# Patient Record
Sex: MALE | Race: BLACK OR AFRICAN AMERICAN | NOT HISPANIC OR LATINO | Employment: OTHER | URBAN - METROPOLITAN AREA
[De-identification: names, ages, dates, MRNs, and addresses within clinical notes are randomized per-mention and may not be internally consistent; named-entity substitution may affect disease eponyms.]

---

## 2019-06-28 ENCOUNTER — PATIENT OUTREACH (OUTPATIENT)
Dept: ADMINISTRATIVE | Facility: HOSPITAL | Age: 69
End: 2019-06-28

## 2019-08-22 ENCOUNTER — PATIENT OUTREACH (OUTPATIENT)
Dept: ADMINISTRATIVE | Facility: HOSPITAL | Age: 69
End: 2019-08-22

## 2020-03-31 ENCOUNTER — NURSE TRIAGE (OUTPATIENT)
Dept: ADMINISTRATIVE | Facility: CLINIC | Age: 70
End: 2020-03-31

## 2020-03-31 NOTE — TELEPHONE ENCOUNTER
Spoke to sister. She is concerned because patient is having diarrhea x 1 week and staying in bed for the past week, not eating/drinking much. Also with cough and cold symptoms. Sister seems very concerned.    Reason for Disposition   Drinking very little and has signs of dehydration (e.g., no urine > 12 hours, very dry mouth, very lightheaded)    Additional Information   Negative: Shock suspected (e.g., cold/pale/clammy skin, too weak to stand, low BP, rapid pulse)   Negative: Difficult to awaken or acting confused (e.g., disoriented, slurred speech)   Negative: Sounds like a life-threatening emergency to the triager   Negative: Vomiting also present and worse than the diarrhea   Negative: Blood in stool and without diarrhea   Negative: SEVERE abdominal pain (e.g., excruciating) and present > 1 hour   Negative: SEVERE abdominal pain and age > 60   Negative: Bloody, black, or tarry bowel movements    Protocols used: DIARRHEA-A-OH    Will call back pt's sister to make sure both are going to the ED

## 2020-06-29 PROBLEM — R55 SYNCOPE: Status: ACTIVE | Noted: 2020-06-29

## 2020-06-29 PROBLEM — F20.9 SCHIZOPHRENIA: Status: RESOLVED | Noted: 2020-06-29 | Resolved: 2020-06-29

## 2020-06-29 PROBLEM — F20.9 SCHIZOPHRENIA: Status: ACTIVE | Noted: 2020-06-29

## 2022-01-10 ENCOUNTER — PATIENT OUTREACH (OUTPATIENT)
Dept: ADMINISTRATIVE | Facility: HOSPITAL | Age: 72
End: 2022-01-10
Payer: MEDICAID

## 2022-02-14 PROBLEM — R39.81 FUNCTIONAL URINARY INCONTINENCE: Status: ACTIVE | Noted: 2022-02-14

## 2022-03-03 ENCOUNTER — LAB VISIT (OUTPATIENT)
Dept: LAB | Facility: HOSPITAL | Age: 72
End: 2022-03-03
Payer: MEDICARE

## 2022-03-03 DIAGNOSIS — Z12.11 COLON CANCER SCREENING: ICD-10-CM

## 2022-03-03 PROCEDURE — 82274 ASSAY TEST FOR BLOOD FECAL: CPT | Performed by: INTERNAL MEDICINE

## 2022-03-07 ENCOUNTER — TELEPHONE (OUTPATIENT)
Dept: ADMINISTRATIVE | Facility: OTHER | Age: 72
End: 2022-03-07
Payer: MEDICAID

## 2022-03-08 ENCOUNTER — TELEPHONE (OUTPATIENT)
Dept: NEUROLOGY | Facility: CLINIC | Age: 72
End: 2022-03-08

## 2022-03-08 ENCOUNTER — OFFICE VISIT (OUTPATIENT)
Dept: NEUROLOGY | Facility: CLINIC | Age: 72
End: 2022-03-08
Payer: MEDICARE

## 2022-03-08 VITALS
HEART RATE: 65 BPM | WEIGHT: 166.25 LBS | SYSTOLIC BLOOD PRESSURE: 142 MMHG | HEIGHT: 74 IN | OXYGEN SATURATION: 99 % | RESPIRATION RATE: 14 BRPM | DIASTOLIC BLOOD PRESSURE: 84 MMHG | BODY MASS INDEX: 21.34 KG/M2

## 2022-03-08 DIAGNOSIS — R41.3 MEMORY LOSS: ICD-10-CM

## 2022-03-08 DIAGNOSIS — F01.518 VASCULAR DEMENTIA WITH BEHAVIOR DISTURBANCE: Primary | ICD-10-CM

## 2022-03-08 DIAGNOSIS — I69.30 LATE EFFECT OF STROKE: ICD-10-CM

## 2022-03-08 DIAGNOSIS — E78.5 HYPERLIPIDEMIA, UNSPECIFIED HYPERLIPIDEMIA TYPE: ICD-10-CM

## 2022-03-08 DIAGNOSIS — F17.200 SMOKER: ICD-10-CM

## 2022-03-08 DIAGNOSIS — F39 MOOD DISORDER: ICD-10-CM

## 2022-03-08 DIAGNOSIS — I10 ESSENTIAL HYPERTENSION: ICD-10-CM

## 2022-03-08 DIAGNOSIS — F91.9 BEHAVIOR DISTURBANCE: ICD-10-CM

## 2022-03-08 LAB — HEMOCCULT STL QL IA: NEGATIVE

## 2022-03-08 PROCEDURE — 3079F DIAST BP 80-89 MM HG: CPT | Mod: CPTII,S$GLB,, | Performed by: PSYCHIATRY & NEUROLOGY

## 2022-03-08 PROCEDURE — 99999 PR PBB SHADOW E&M-EST. PATIENT-LVL IV: CPT | Mod: PBBFAC,,, | Performed by: PSYCHIATRY & NEUROLOGY

## 2022-03-08 PROCEDURE — 1159F PR MEDICATION LIST DOCUMENTED IN MEDICAL RECORD: ICD-10-PCS | Mod: CPTII,S$GLB,, | Performed by: PSYCHIATRY & NEUROLOGY

## 2022-03-08 PROCEDURE — 1126F AMNT PAIN NOTED NONE PRSNT: CPT | Mod: CPTII,S$GLB,, | Performed by: PSYCHIATRY & NEUROLOGY

## 2022-03-08 PROCEDURE — 3077F SYST BP >= 140 MM HG: CPT | Mod: CPTII,S$GLB,, | Performed by: PSYCHIATRY & NEUROLOGY

## 2022-03-08 PROCEDURE — 3077F PR MOST RECENT SYSTOLIC BLOOD PRESSURE >= 140 MM HG: ICD-10-PCS | Mod: CPTII,S$GLB,, | Performed by: PSYCHIATRY & NEUROLOGY

## 2022-03-08 PROCEDURE — 99999 PR PBB SHADOW E&M-EST. PATIENT-LVL IV: ICD-10-PCS | Mod: PBBFAC,,, | Performed by: PSYCHIATRY & NEUROLOGY

## 2022-03-08 PROCEDURE — 3079F PR MOST RECENT DIASTOLIC BLOOD PRESSURE 80-89 MM HG: ICD-10-PCS | Mod: CPTII,S$GLB,, | Performed by: PSYCHIATRY & NEUROLOGY

## 2022-03-08 PROCEDURE — 1159F MED LIST DOCD IN RCRD: CPT | Mod: CPTII,S$GLB,, | Performed by: PSYCHIATRY & NEUROLOGY

## 2022-03-08 PROCEDURE — 1160F PR REVIEW ALL MEDS BY PRESCRIBER/CLIN PHARMACIST DOCUMENTED: ICD-10-PCS | Mod: CPTII,S$GLB,, | Performed by: PSYCHIATRY & NEUROLOGY

## 2022-03-08 PROCEDURE — 3008F PR BODY MASS INDEX (BMI) DOCUMENTED: ICD-10-PCS | Mod: CPTII,S$GLB,, | Performed by: PSYCHIATRY & NEUROLOGY

## 2022-03-08 PROCEDURE — 1160F RVW MEDS BY RX/DR IN RCRD: CPT | Mod: CPTII,S$GLB,, | Performed by: PSYCHIATRY & NEUROLOGY

## 2022-03-08 PROCEDURE — 1126F PR PAIN SEVERITY QUANTIFIED, NO PAIN PRESENT: ICD-10-PCS | Mod: CPTII,S$GLB,, | Performed by: PSYCHIATRY & NEUROLOGY

## 2022-03-08 PROCEDURE — 99205 OFFICE O/P NEW HI 60 MIN: CPT | Mod: S$GLB,,, | Performed by: PSYCHIATRY & NEUROLOGY

## 2022-03-08 PROCEDURE — 3008F BODY MASS INDEX DOCD: CPT | Mod: CPTII,S$GLB,, | Performed by: PSYCHIATRY & NEUROLOGY

## 2022-03-08 PROCEDURE — 99205 PR OFFICE/OUTPT VISIT, NEW, LEVL V, 60-74 MIN: ICD-10-PCS | Mod: S$GLB,,, | Performed by: PSYCHIATRY & NEUROLOGY

## 2022-03-08 PROCEDURE — 3044F PR MOST RECENT HEMOGLOBIN A1C LEVEL <7.0%: ICD-10-PCS | Mod: CPTII,S$GLB,, | Performed by: PSYCHIATRY & NEUROLOGY

## 2022-03-08 PROCEDURE — 3044F HG A1C LEVEL LT 7.0%: CPT | Mod: CPTII,S$GLB,, | Performed by: PSYCHIATRY & NEUROLOGY

## 2022-03-08 NOTE — PROGRESS NOTES
Consult from Dr. Lundberg    HPI: Irvin Nuñez Jr. is a 71 y.o. male with a history of memory problems first noted by sister The symptoms started 3 years ago.   Examples of memory problems noted include? He  forges why he walked into a room. Biggest issue is him not wanting to get up to urinate. He has some incontinence perhaps as a result.Family tries timed voiding but to no avail. PCP prescribed condom cath, diapers.  Sister thinks he is stubborn. It is easy for him to be annoyed when encouraged regarding hygieine but fluoxetine helps  He talks less to people overall  Stable with memory overall but less motivated over time    Level of education completed is did not attend school  Occupation is unemployed  Impairment in ADLS such as doing bills, cooking, doing laundry, dressing self? Sister does all bills and cooking and he can get himself dressed without difficulty    Mood is described as ok by him and sister as long as he is left alone   The patient does not have any delusions, hallucinations, paranoia,   or tremors.  Falls rarely. Needs assistant with walking when drinking    He still drinks rarely but will binge drink about every 3-4 months.    Chart says schizophrenia but sister does known he was  Hospitalized remotely for mood but he had had a withdrawn mood most of his life    He can read and write sometime.     There is not a Family History of memory disorders. The patient is not still driving and is not still cooking.   Prior brain imaging or memory labs work-up? Done in 2020 with admission for syncope.     No wandering and no hallucinations currently  + Smoker    Fainting but not seizure history. Prior alcoholic  Did not have any known prior stroke history until found on MRIs per sister  BP mildly hypertensive today  P 60s    He is incontinent    Patient is here with his sister who lives next door to him and provides him with good support      Review of Systems   Unable to perform ROS: Dementia         I  have reviewed all of this patient's past medical and surgical histories as well as family and social histories and active allergies and medications as documented in the electronic medical record.        Exam:  Gen Appearance, well developed/nourished in no apparent distress  CV: 2+ distal pulses with no edema or swelling  Neuro:  MS: Awake, alert, oriented to place, person, but not to time, and not fully situation. Sustains attention. Recent is 2/3 at 5 minutes/remote memory intact, Language is full to spontaneous speech/repetition/naming/comprehension. Fund of Knowledge is full  Can't name current president but only 2 past presidents  Clock drawing is excelent  Psychomotor slowing with task noted mildly  Patient is calm and president  CN: Optic discs are flat with normal vasculature, PERRL, Extraoccular movements and visual fields are full. Normal facial sensation and strength, Hearing symmetric, Tongue and Palate are midline and strong. Shoulder Shrug symmetric and strong.  Motor: Normal bulk, tone, no abnormal movements. 5/5 strength bilateral upper/lower extremities with 2+ reflexes and bilateral plantar response  Sensory: symmetric to light touch, pain, temp, and vibration/proprioception. Romberg negative  Cerebellar: Finger-nose,Heal-shin, Rapid alternating movements intact  Gait: Normal stance, no ataxia but slowed gait and walks with a walker    Imagin2020 MRI brain: Study significantly degraded by motion artifact.     No evidence of acute infarction.     Mild atrophy, severe presumed microvascular ischemic changes cerebral white matter, old infarct anterior right basal ganglia, incidental prominent perivascular spaces and/or old lacunar infarcts bilateral basal ganglia and thalami and additional presumed microvascular ischemic change central radha.     Echo: The left ventricle (LV) is normal in size with normal systolic function. Normal LV geometry. False LV tendon.  1. There are no significant  regional wall motion abnormalities.  2. LV ejection fraction is 55-60%. Normal LV diastolic function.  3. The right ventricle (RV) is normal in size with normal systolic function.   4. Estimated RVSP is normal (less than 40 mmHg). Pulmonary hypertension is absent.  5. Normal sized atria.  6. The aortic valve is poorly visualized; appears sclerocalcific - probably mild stenosis with PV of 1.97 m/s, KATRINA 2.0 cm2, MG 8.  7. No prior studies available for comparison.    Per record: Carotid U/S without significant obstruction in 2019    Labs: 2020 RPR / TSH / B12 / Folate unremarkable  CMP: CKD  CBC: anemia    2020 EKG: NSR    2022 A1C: 5.4    2019 LDL 70    Assessment/Plan: Irvin Nuñez Jr. is a 71 y.o. male with no known history of stroke who has developed memory loss over the past 3 year (currently stable). Multiples lacunar strokes on MRI brain 2020  I recommend:     1. 2020 MRI brain: multiple bilateral lacunar infarcts and microvascular changes  2. 2020 Echo: normal EF, Carotid U/S without significant obstruction in 2019 per record  3. Stroke risk factors include HTN, DLD, and smoking  -For stroke prevention: continue ASA, statin, and anti-HTN meds  -Goal BP less than 140/90, Goal LDL less than 70, and Goal A1C less than 7. Labs followed by PCP  -He and family are not interested in smoking cessation although this was urged today for stroke prevention  4. Discussed he likely has Vascular dementia  - Also has a significant, remote EtOH hx but also binge drinks a few times per year. This was discouraged  - Cont Aricept per PCP  -PCP treats mood  -Schizophrenia history noted in chart. At least at this time, he is not actively psychotic but has been prior. His sister states his motivation has been poor and he has had withdrawn behavior his entire life. His poor motivation is limiting his care. Will refer to psychiatry for better evaluation / to rule out any superimposed mood other other psychiatric disorder.   -His  sister who lives next door helps in his care. He no longer drives   5. CKD noted    RTC given  CC: Dr. Lundberg

## 2023-04-26 DIAGNOSIS — Z12.11 COLON CANCER SCREENING: ICD-10-CM

## 2023-07-19 ENCOUNTER — PATIENT OUTREACH (OUTPATIENT)
Dept: ADMINISTRATIVE | Facility: HOSPITAL | Age: 73
End: 2023-07-19
Payer: MEDICARE

## 2023-09-22 PROBLEM — F01.50 VASCULAR DEMENTIA: Status: ACTIVE | Noted: 2023-09-22

## 2023-09-22 PROBLEM — R40.20 LOC (LOSS OF CONSCIOUSNESS): Status: ACTIVE | Noted: 2023-09-22

## 2023-09-22 PROBLEM — J18.9 COMMUNITY ACQUIRED PNEUMONIA OF LEFT LUNG: Status: ACTIVE | Noted: 2023-09-22

## 2023-09-22 PROBLEM — R55 SYNCOPE: Status: ACTIVE | Noted: 2023-09-22

## 2023-09-22 PROBLEM — M54.50 CHRONIC LOW BACK PAIN: Status: ACTIVE | Noted: 2023-09-22

## 2023-09-22 PROBLEM — G89.29 CHRONIC LOW BACK PAIN: Status: ACTIVE | Noted: 2023-09-22

## 2023-09-25 ENCOUNTER — TELEPHONE (OUTPATIENT)
Dept: NEUROLOGY | Facility: CLINIC | Age: 73
End: 2023-09-25
Payer: MEDICARE

## 2023-09-25 PROBLEM — L89.322 PRESSURE INJURY OF LEFT BUTTOCK, STAGE 2: Status: ACTIVE | Noted: 2023-09-25

## 2023-09-25 NOTE — TELEPHONE ENCOUNTER
Spoke with MD from [facility]. Patient has had syncopal episodes, thought to be orthostatic in nature  Has not seen me over  a year, so I recommended he re-establish follow up  In the interim:   Suggested primary team ask urology if flomax could be d/c'ed or switch. Suggested compression stockings.

## 2023-09-28 ENCOUNTER — OFFICE VISIT (OUTPATIENT)
Dept: NEUROLOGY | Facility: CLINIC | Age: 73
End: 2023-09-28
Payer: MEDICARE

## 2023-09-28 ENCOUNTER — OFFICE VISIT (OUTPATIENT)
Dept: UROLOGY | Facility: CLINIC | Age: 73
End: 2023-09-28
Attending: SPECIALIST
Payer: MEDICARE

## 2023-09-28 VITALS
DIASTOLIC BLOOD PRESSURE: 64 MMHG | BODY MASS INDEX: 21.48 KG/M2 | HEIGHT: 74 IN | RESPIRATION RATE: 16 BRPM | OXYGEN SATURATION: 99 % | SYSTOLIC BLOOD PRESSURE: 112 MMHG | HEART RATE: 79 BPM

## 2023-09-28 VITALS
WEIGHT: 167 LBS | OXYGEN SATURATION: 98 % | BODY MASS INDEX: 21.43 KG/M2 | DIASTOLIC BLOOD PRESSURE: 64 MMHG | HEART RATE: 78 BPM | SYSTOLIC BLOOD PRESSURE: 122 MMHG | HEIGHT: 74 IN

## 2023-09-28 DIAGNOSIS — R55 SYNCOPE, UNSPECIFIED SYNCOPE TYPE: ICD-10-CM

## 2023-09-28 DIAGNOSIS — Z86.73 HISTORY OF CVA (CEREBROVASCULAR ACCIDENT): ICD-10-CM

## 2023-09-28 DIAGNOSIS — I10 ESSENTIAL HYPERTENSION: ICD-10-CM

## 2023-09-28 DIAGNOSIS — N40.0 BENIGN PROSTATIC HYPERPLASIA, UNSPECIFIED WHETHER LOWER URINARY TRACT SYMPTOMS PRESENT: Primary | ICD-10-CM

## 2023-09-28 DIAGNOSIS — F01.B3 MODERATE VASCULAR DEMENTIA WITH MOOD DISTURBANCE: Primary | ICD-10-CM

## 2023-09-28 PROCEDURE — 99999 PR PBB SHADOW E&M-EST. PATIENT-LVL III: ICD-10-PCS | Mod: PBBFAC,,, | Performed by: NURSE PRACTITIONER

## 2023-09-28 PROCEDURE — 1159F MED LIST DOCD IN RCRD: CPT | Mod: CPTII,S$GLB,, | Performed by: SPECIALIST

## 2023-09-28 PROCEDURE — 99999 PR PBB SHADOW E&M-EST. PATIENT-LVL III: CPT | Mod: PBBFAC,,, | Performed by: NURSE PRACTITIONER

## 2023-09-28 PROCEDURE — 1159F PR MEDICATION LIST DOCUMENTED IN MEDICAL RECORD: ICD-10-PCS | Mod: CPTII,S$GLB,, | Performed by: SPECIALIST

## 2023-09-28 PROCEDURE — 3078F DIAST BP <80 MM HG: CPT | Mod: CPTII,S$GLB,, | Performed by: SPECIALIST

## 2023-09-28 PROCEDURE — 3078F PR MOST RECENT DIASTOLIC BLOOD PRESSURE < 80 MM HG: ICD-10-PCS | Mod: CPTII,S$GLB,, | Performed by: NURSE PRACTITIONER

## 2023-09-28 PROCEDURE — 99999 PR PBB SHADOW E&M-EST. PATIENT-LVL III: ICD-10-PCS | Mod: PBBFAC,,, | Performed by: SPECIALIST

## 2023-09-28 PROCEDURE — 3074F PR MOST RECENT SYSTOLIC BLOOD PRESSURE < 130 MM HG: ICD-10-PCS | Mod: CPTII,S$GLB,, | Performed by: NURSE PRACTITIONER

## 2023-09-28 PROCEDURE — 3008F PR BODY MASS INDEX (BMI) DOCUMENTED: ICD-10-PCS | Mod: CPTII,S$GLB,, | Performed by: NURSE PRACTITIONER

## 2023-09-28 PROCEDURE — 3074F PR MOST RECENT SYSTOLIC BLOOD PRESSURE < 130 MM HG: ICD-10-PCS | Mod: CPTII,S$GLB,, | Performed by: SPECIALIST

## 2023-09-28 PROCEDURE — 3008F PR BODY MASS INDEX (BMI) DOCUMENTED: ICD-10-PCS | Mod: CPTII,S$GLB,, | Performed by: SPECIALIST

## 2023-09-28 PROCEDURE — 1126F PR PAIN SEVERITY QUANTIFIED, NO PAIN PRESENT: ICD-10-PCS | Mod: CPTII,S$GLB,, | Performed by: SPECIALIST

## 2023-09-28 PROCEDURE — 3008F BODY MASS INDEX DOCD: CPT | Mod: CPTII,S$GLB,, | Performed by: NURSE PRACTITIONER

## 2023-09-28 PROCEDURE — 1126F AMNT PAIN NOTED NONE PRSNT: CPT | Mod: CPTII,S$GLB,, | Performed by: SPECIALIST

## 2023-09-28 PROCEDURE — 1101F PR PT FALLS ASSESS DOC 0-1 FALLS W/OUT INJ PAST YR: ICD-10-PCS | Mod: CPTII,S$GLB,, | Performed by: SPECIALIST

## 2023-09-28 PROCEDURE — 99214 PR OFFICE/OUTPT VISIT, EST, LEVL IV, 30-39 MIN: ICD-10-PCS | Mod: S$GLB,,, | Performed by: NURSE PRACTITIONER

## 2023-09-28 PROCEDURE — 3074F SYST BP LT 130 MM HG: CPT | Mod: CPTII,S$GLB,, | Performed by: NURSE PRACTITIONER

## 2023-09-28 PROCEDURE — 3008F BODY MASS INDEX DOCD: CPT | Mod: CPTII,S$GLB,, | Performed by: SPECIALIST

## 2023-09-28 PROCEDURE — 3288F PR FALLS RISK ASSESSMENT DOCUMENTED: ICD-10-PCS | Mod: CPTII,S$GLB,, | Performed by: SPECIALIST

## 2023-09-28 PROCEDURE — 1159F PR MEDICATION LIST DOCUMENTED IN MEDICAL RECORD: ICD-10-PCS | Mod: CPTII,S$GLB,, | Performed by: NURSE PRACTITIONER

## 2023-09-28 PROCEDURE — 1159F MED LIST DOCD IN RCRD: CPT | Mod: CPTII,S$GLB,, | Performed by: NURSE PRACTITIONER

## 2023-09-28 PROCEDURE — 3078F PR MOST RECENT DIASTOLIC BLOOD PRESSURE < 80 MM HG: ICD-10-PCS | Mod: CPTII,S$GLB,, | Performed by: SPECIALIST

## 2023-09-28 PROCEDURE — 1160F RVW MEDS BY RX/DR IN RCRD: CPT | Mod: CPTII,S$GLB,, | Performed by: SPECIALIST

## 2023-09-28 PROCEDURE — 99202 PR OFFICE/OUTPT VISIT, NEW, LEVL II, 15-29 MIN: ICD-10-PCS | Mod: S$GLB,,, | Performed by: SPECIALIST

## 2023-09-28 PROCEDURE — 3288F FALL RISK ASSESSMENT DOCD: CPT | Mod: CPTII,S$GLB,, | Performed by: SPECIALIST

## 2023-09-28 PROCEDURE — 99202 OFFICE O/P NEW SF 15 MIN: CPT | Mod: S$GLB,,, | Performed by: SPECIALIST

## 2023-09-28 PROCEDURE — 3078F DIAST BP <80 MM HG: CPT | Mod: CPTII,S$GLB,, | Performed by: NURSE PRACTITIONER

## 2023-09-28 PROCEDURE — 1101F PT FALLS ASSESS-DOCD LE1/YR: CPT | Mod: CPTII,S$GLB,, | Performed by: SPECIALIST

## 2023-09-28 PROCEDURE — 1160F PR REVIEW ALL MEDS BY PRESCRIBER/CLIN PHARMACIST DOCUMENTED: ICD-10-PCS | Mod: CPTII,S$GLB,, | Performed by: SPECIALIST

## 2023-09-28 PROCEDURE — 3074F SYST BP LT 130 MM HG: CPT | Mod: CPTII,S$GLB,, | Performed by: SPECIALIST

## 2023-09-28 PROCEDURE — 99999 PR PBB SHADOW E&M-EST. PATIENT-LVL III: CPT | Mod: PBBFAC,,, | Performed by: SPECIALIST

## 2023-09-28 PROCEDURE — 99214 OFFICE O/P EST MOD 30 MIN: CPT | Mod: S$GLB,,, | Performed by: NURSE PRACTITIONER

## 2023-09-28 NOTE — PROGRESS NOTES
HPI: Irvin Nuñez Jr. is a 73 y.o. male with vascular dementia and a history of mood disorder.     He lives in [place].     He presents today to reestablish care for his dementia. Recent history of syncope believed to be related to hypotension. Flomax was discontinued. No further episodes since his discharge. PCP is monitoring this.     He is here with his sister today. He lives with his sister. She does have difficulty caring for him at times. There was a home health referral after his recent admit, but no one has contacted her to set this up.     Memory has progressed over the past year. He is slower to respond and is less interactive. He is resistant to care at times. PCP is managing his mood.     He does sleep well at night.     No hallucinations reported, but he rubs his feet as if he is swatting something away at times.     Good appetite.     He is non ambulatory.    He is incontinent    Review of Systems   Unable to perform ROS: Dementia         I have reviewed all of this patient's past medical and surgical histories as well as family and social histories and active allergies and medications as documented in the electronic medical record.    Exam:  Gen Appearance, well developed/nourished in no apparent distress  CV: 2+ distal pulses with no edema or swelling  Neuro:  MS: Awake, alert, oriented to place, person, but not to time, and not fully situation. Sustains attention. Recent recall is moderately impaired, Language is full to spontaneous speech/repetition/naming/comprehension. Fund of Knowledge is full.   Unable to name any Presidents today  Psychomotor slowing with task noted mildly  Patient is calm  CN: PERRL, Extraoccular movements and visual fields are full. Normal facial sensation and strength, Hearing symmetric, Tongue and Palate are midline and strong. Shoulder Shrug symmetric and strong.  Motor: Normal bulk, tone, no abnormal movements. 5/5 strength bilateral upper/lower extremities with 2+  reflexes and bilateral plantar response  Sensory: symmetric to light touch, pain, temp, and vibration/proprioception. Romberg negative  Cerebellar: Finger-nose,Heal-shin, Rapid alternating movements intact  Gait: not done; seated in wheelchair today.     Imagin/2023 MRI Brain:   FINDINGS:  Evaluation is limited by motion artifact.     No evidence of acute infarction.     Moderate atrophy.  Fairly extensive patchy and confluent nonspecific T2 hyperintensities again noted cerebral white matter and may reflect severe microvascular ischemic change.  Multiple old bilateral basal ganglia and thalamic lacunar infarcts are present with associated volume loss.  Old subcentimeter infarcts also again noted corpus callosum and deep white matter.  Old lacunar infarcts with additional presumed microvascular ischemic change central radha.     No intracranial space-occupying mass or mass effect.     Mucosal thickening right frontal and left maxillary sinuses.     Impression:     No evidence of acute infarction.     Again demonstrated are moderate atrophy, severe presumed microvascular ischemic change white matter and central radha, multiple old bilateral basal ganglia and thalamic lacunar infarcts with associated volume loss basal ganglia, old deep white matter infarcts and multiple small old infarcts corpus callosum.       2020 MRI brain: Study significantly degraded by motion artifact.     No evidence of acute infarction.     Mild atrophy, severe presumed microvascular ischemic changes cerebral white matter, old infarct anterior right basal ganglia, incidental prominent perivascular spaces and/or old lacunar infarcts bilateral basal ganglia and thalami and additional presumed microvascular ischemic change central radha.     Echo: The left ventricle (LV) is normal in size with normal systolic function. Normal LV geometry. False LV tendon.  There are no significant regional wall motion abnormalities.  LV ejection fraction  is 55-60%. Normal LV diastolic function.  The right ventricle (RV) is normal in size with normal systolic function.   Estimated RVSP is normal (less than 40 mmHg). Pulmonary hypertension is absent.  Normal sized atria.  The aortic valve is poorly visualized; appears sclerocalcific - probably mild stenosis with PV of 1.97 m/s, KATRINA 2.0 cm2, MG 8.  No prior studies available for comparison.    Per record: Carotid U/S without significant obstruction in 2019    Labs: 2020 RPR / TSH / B12 / Folate unremarkable  CMP: CKD  CBC: anemia    2020 EKG: NSR    2022 A1C: 5.4    2019 LDL 70    Assessment/Plan: Irvin Nuñez Jr. is a 73 y.o. male with no known history of stroke who has developed memory loss over the past 3 year (currently stable). Multiples lacunar strokes on MRI brain 2020  I recommend:     1. 2020 MRI brain: multiple bilateral lacunar infarcts and microvascular changes  2. 2020 Echo: normal EF, Carotid U/S without significant obstruction in 2019 per record  3. Stroke risk factors include HTN, DLD, and smoking  -For stroke prevention: continue ASA, statin, and anti-HTN meds  -Goal BP less than 140/90, Goal LDL less than 70, and Goal A1C less than 7. Labs followed by PCP  -He and family are not interested in smoking cessation although this was urged today for stroke prevention  4. Clinical picture consistent with vascular dementia. Care is supportive.   - Also has a significant, remote EtOH hx but also binge drinks a few times per year. This was discouraged  - Cont Aricept per PCP  -PCP treats mood  -Schizophrenia history noted in chart. At least at this time, he is not actively psychotic but has been prior. His sister states his motivation has been poor and he has had withdrawn behavior his entire life. His poor motivation is limiting his care. Will refer to psychiatry for better evaluation / to rule out any superimposed mood other other psychiatric disorder.   -His sister who lives next door helps in his care. He  no longer drives   5. CKD noted  6. Will try to coordinate with home health per referral from recent admit to make an initial home visit. I discussed [place] on aging also to help his sister with resources to care for him.   7. Recent syncope may have been hypotension related. No further episodes since some of his medications were reduced. Episodes do not seem consistent with seizures. Should he have more episodes, I would recommend that he consult with Cardiology.     FU 1 year regarding dementia    CC: Dr. Lundberg

## 2023-09-28 NOTE — PROGRESS NOTES
[facility] - Urology   Clinic Note    SUBJECTIVE:     Chief Complaint   Patient presents with    Benign Prostatic Hypertrophy     Pt coming in for an enlarged prostate, states that he has to wear pull ups and another pad to help keep him dry but he still wakes up soaking wet.        Referral from: Self, Aaareferral.    History of Present Illness:  Irvin Nuñez Jr. is a 73 y.o. male who presents to clinic for incontinence & failure to thrive.    He was just discharge from hospital after some falls.  Unclear if  involved.  He is primarily looked after by his sister.  Chief complaint is no urinary control.  She wants to send him home from clinic with a patino catheter.  I scanned his bladder and he has less than 25 cc of urine.  I recommended a condom catheter and reviewed such products on line as well as ordering information.    Patient endorses no additional complaints at this time.    Past Medical History:   Diagnosis Date    High cholesterol     Hypertension     Incontinence of urine     Schizophrenia     Vascular dementia        Past Surgical History:   Procedure Laterality Date    NO PAST SURGERIES         Family History   Problem Relation Age of Onset    Heart attack Mother     No Known Problems Father        Social History     Tobacco Use    Smoking status: Former     Current packs/day: 0.50     Average packs/day: 0.5 packs/day for 0.5 years (0.3 ttl pk-yrs)     Types: Cigarettes     Start date: 3/22/2023    Smokeless tobacco: Never   Substance Use Topics    Alcohol use: Yes     Alcohol/week: 0.0 standard drinks of alcohol     Comment: beer/wine daily    Drug use: No       Current Outpatient Medications on File Prior to Visit   Medication Sig Dispense Refill    alfuzosin (UROXATRAL) 10 mg Tb24 Take 1 tablet (10 mg total) by mouth daily with breakfast. 90 tablet 3    amLODIPine (NORVASC) 2.5 MG tablet Take 1 tablet (2.5 mg total) by mouth once daily. 90 tablet 3    diclofenac sodium  "(VOLTAREN) 1 % Gel APPLY 2g topically once DAILY 200 g 2    donepeziL (ARICEPT) 10 MG tablet TAKE ONE TABLET BY MOUTH EVERY EVENING 30 tablet 11    finasteride (PROSCAR) 5 mg tablet Take 1 tablet (5 mg total) by mouth once daily. 90 tablet 3    FLUoxetine 20 MG capsule TAKE ONE CAPSULE BY MOUTH EVERY MORNING 90 capsule 3    folic acid (FOLVITE) 1 MG tablet TAKE ONE TABLET BY MOUTH EVERY NIGHT 90 tablet 3    pravastatin (PRAVACHOL) 40 MG tablet Take 1 tablet (40 mg total) by mouth every evening. 30 tablet 11    traMADoL (ULTRAM) 50 mg tablet TAKE TWO TABLETS BY MOUTH THREE TIMES DAILY AS NEEDED FOR PAIN 180 tablet 0     No current facility-administered medications on file prior to visit.       Review of patient's allergies indicates:  No Known Allergies    ROS     Review of Systems:  A review of 10+ systems was conducted with pertinent positive and negative findings documented in HPI with all other systems reviewed and negative.    OBJECTIVE:     Estimated body mass index is 21.44 kg/m² as calculated from the following:    Height as of this encounter: 6' 2" (1.88 m).    Weight as of this encounter: 75.8 kg (167 lb).    Vital Signs (Most Recent)  Vitals:    09/28/23 1327   BP: 122/64   Pulse: 78       Physical Exam:    Physical Exam     GENERAL: patient sitting comfortably  HEENT: normocephalic  NECK: supple, no JVD  PULM: normal chest rise, no increased WOB  HEART: non-diaphoretic  ABDO: soft, nondistended, nontender, bladder not distended  PSYCH: appropriate mood and affect        LABS:     Lab Results   Component Value Date    BUN 8 09/24/2023    CREATININE 0.7 09/24/2023    WBC 6.52 09/24/2023    HGB 10.9 (L) 09/24/2023    HCT 34.4 (L) 09/24/2023     09/24/2023    AST 25 09/22/2023    ALT 21 09/22/2023    ALKPHOS 64 09/22/2023    ALBUMIN 2.9 (L) 09/22/2023    HGBA1C 5.4 02/14/2022    INR 1.0 09/22/2023        Urinalysis:   No results found for: "UAREFLEX"     PSA:  Lab Results   Component Value Date    PSA " 1.7 02/14/2022    PSA 2.0 06/23/2020    PSA 1.4 03/26/2019    PSA 1.7 10/14/2015       ASSESSMENT     1. Benign prostatic hyperplasia, unspecified whether lower urinary tract symptoms present        PLAN:     Use condom catheter as discussed.   for home care pending    Reymundo Oquendo MD  Urology  [facility]     Disclaimer: This note has been generated using voice-recognition software. There may be typographical errors that have been missed during proof-reading.

## 2023-10-11 ENCOUNTER — TELEPHONE (OUTPATIENT)
Dept: UROLOGY | Facility: CLINIC | Age: 73
End: 2023-10-11
Payer: MEDICARE

## 2023-10-11 NOTE — TELEPHONE ENCOUNTER
Returned call to Meenakshi  regarding pt, Orin stated that she seen the Condom Catheter order form in pt's home and wanted to know if she needed to order this item or if item was already ordered. Told Orin that I would get with Dr. Oquendo and see what he wants to do.  nurse verbalized understanding.

## 2023-10-11 NOTE — TELEPHONE ENCOUNTER
----- Message from Reymundo Oquendo MD sent at 10/10/2023  4:33 PM CDT -----  Contact: [facility]  Patient needs to be seen by home health  ----- Message -----  From: Isaura Barrera MA  Sent: 10/10/2023   8:30 AM CDT  To: Reymundo Oquendo MD    Please Advise.    ----- Message -----  From: Dyan Cordon MA  Sent: 10/6/2023   2:18 PM CDT  To: [facility] Staff    Irvin Nuñez Jr.  MRN: [id number]  : 1950  PCP: Alberto Lundberg Jr.  Home Phone      [phone]  Work Phone      Not on file.  Mobile          [phone]      MESSAGE:    Patient was sent home with paperwork for a condom catheder, home health needs to know if this was ordered for patient or if it needs to be ordered through a DME. They are aware message will be addressed on Monday. Phone number is [phone]

## 2023-10-13 NOTE — TELEPHONE ENCOUNTER
Called Orin from [facility] back regarding our pt's condom catheters. Told her per Dr. Oquendo that he would like them to order the catheters. Orin would like for us to fax over the prescription form so she can get them ordered. Told her I would let Dr. Oquendo know and try and get those faxed over today. Orin verbalized understanding.

## 2023-10-14 DIAGNOSIS — N39.3: Primary | ICD-10-CM

## 2023-10-23 ENCOUNTER — EXTERNAL HOME HEALTH (OUTPATIENT)
Dept: HOME HEALTH SERVICES | Facility: HOSPITAL | Age: 73
End: 2023-10-23
Payer: MEDICARE

## 2023-10-26 ENCOUNTER — DOCUMENT SCAN (OUTPATIENT)
Dept: HOME HEALTH SERVICES | Facility: HOSPITAL | Age: 73
End: 2023-10-26
Payer: MEDICARE

## 2023-11-21 ENCOUNTER — DOCUMENT SCAN (OUTPATIENT)
Dept: HOME HEALTH SERVICES | Facility: HOSPITAL | Age: 73
End: 2023-11-21
Payer: MEDICARE

## 2023-11-27 ENCOUNTER — OFFICE VISIT (OUTPATIENT)
Dept: UROLOGY | Facility: CLINIC | Age: 73
End: 2023-11-27
Attending: SPECIALIST
Payer: MEDICARE

## 2023-11-27 VITALS
HEART RATE: 75 BPM | DIASTOLIC BLOOD PRESSURE: 68 MMHG | HEIGHT: 74 IN | BODY MASS INDEX: 20.4 KG/M2 | OXYGEN SATURATION: 97 % | SYSTOLIC BLOOD PRESSURE: 126 MMHG | WEIGHT: 158.94 LBS

## 2023-11-27 DIAGNOSIS — N39.3: Primary | ICD-10-CM

## 2023-11-27 DIAGNOSIS — N40.0 BENIGN PROSTATIC HYPERPLASIA, UNSPECIFIED WHETHER LOWER URINARY TRACT SYMPTOMS PRESENT: ICD-10-CM

## 2023-11-27 PROCEDURE — 1160F PR REVIEW ALL MEDS BY PRESCRIBER/CLIN PHARMACIST DOCUMENTED: ICD-10-PCS | Mod: CPTII,S$GLB,, | Performed by: SPECIALIST

## 2023-11-27 PROCEDURE — 99999 PR PBB SHADOW E&M-EST. PATIENT-LVL III: CPT | Mod: PBBFAC,,, | Performed by: SPECIALIST

## 2023-11-27 PROCEDURE — 3288F PR FALLS RISK ASSESSMENT DOCUMENTED: ICD-10-PCS | Mod: CPTII,S$GLB,, | Performed by: SPECIALIST

## 2023-11-27 PROCEDURE — 1101F PT FALLS ASSESS-DOCD LE1/YR: CPT | Mod: CPTII,S$GLB,, | Performed by: SPECIALIST

## 2023-11-27 PROCEDURE — 1101F PR PT FALLS ASSESS DOC 0-1 FALLS W/OUT INJ PAST YR: ICD-10-PCS | Mod: CPTII,S$GLB,, | Performed by: SPECIALIST

## 2023-11-27 PROCEDURE — 3078F DIAST BP <80 MM HG: CPT | Mod: CPTII,S$GLB,, | Performed by: SPECIALIST

## 2023-11-27 PROCEDURE — 1159F MED LIST DOCD IN RCRD: CPT | Mod: CPTII,S$GLB,, | Performed by: SPECIALIST

## 2023-11-27 PROCEDURE — 99214 PR OFFICE/OUTPT VISIT, EST, LEVL IV, 30-39 MIN: ICD-10-PCS | Mod: S$GLB,,, | Performed by: SPECIALIST

## 2023-11-27 PROCEDURE — 1159F PR MEDICATION LIST DOCUMENTED IN MEDICAL RECORD: ICD-10-PCS | Mod: CPTII,S$GLB,, | Performed by: SPECIALIST

## 2023-11-27 PROCEDURE — 3288F FALL RISK ASSESSMENT DOCD: CPT | Mod: CPTII,S$GLB,, | Performed by: SPECIALIST

## 2023-11-27 PROCEDURE — 3074F SYST BP LT 130 MM HG: CPT | Mod: CPTII,S$GLB,, | Performed by: SPECIALIST

## 2023-11-27 PROCEDURE — 3078F PR MOST RECENT DIASTOLIC BLOOD PRESSURE < 80 MM HG: ICD-10-PCS | Mod: CPTII,S$GLB,, | Performed by: SPECIALIST

## 2023-11-27 PROCEDURE — 99999 PR PBB SHADOW E&M-EST. PATIENT-LVL III: ICD-10-PCS | Mod: PBBFAC,,, | Performed by: SPECIALIST

## 2023-11-27 PROCEDURE — 3008F PR BODY MASS INDEX (BMI) DOCUMENTED: ICD-10-PCS | Mod: CPTII,S$GLB,, | Performed by: SPECIALIST

## 2023-11-27 PROCEDURE — 1160F RVW MEDS BY RX/DR IN RCRD: CPT | Mod: CPTII,S$GLB,, | Performed by: SPECIALIST

## 2023-11-27 PROCEDURE — 3008F BODY MASS INDEX DOCD: CPT | Mod: CPTII,S$GLB,, | Performed by: SPECIALIST

## 2023-11-27 PROCEDURE — 3074F PR MOST RECENT SYSTOLIC BLOOD PRESSURE < 130 MM HG: ICD-10-PCS | Mod: CPTII,S$GLB,, | Performed by: SPECIALIST

## 2023-11-27 PROCEDURE — 99214 OFFICE O/P EST MOD 30 MIN: CPT | Mod: S$GLB,,, | Performed by: SPECIALIST

## 2023-11-27 NOTE — PROGRESS NOTES
"[facility] - Urology   Clinic Note    SUBJECTIVE:     Chief Complaint   Patient presents with    Urinary Leakage     Pt coming        Referral from: No ref. provider found.    History of Present Illness:  Irvin Nuñez Jr. is a 73 y.o. male who presents to clinic for follow up.    In summary, Mr. Bryan's sister has been caring for her brother.  He is incontinent and has previously been wearing a condom catheter.  However, he continues to go through many depends daily and has been overwhelming to be taken care of by his sister.  Likewise, we agreed to place a patino to [place].     Past Medical History:   Diagnosis Date    High cholesterol     Hypertension     Incontinence of urine     Schizophrenia     Vascular dementia        Current Outpatient Medications on File Prior to Visit   Medication Sig Dispense Refill    alfuzosin (UROXATRAL) 10 mg Tb24 Take 1 tablet (10 mg total) by mouth daily with breakfast. 90 tablet 3    amLODIPine (NORVASC) 2.5 MG tablet Take 1 tablet (2.5 mg total) by mouth once daily. 90 tablet 3    diclofenac sodium (VOLTAREN) 1 % Gel APPLY 2g topically once DAILY 200 g 2    donepeziL (ARICEPT) 10 MG tablet TAKE ONE TABLET BY MOUTH EVERY EVENING 30 tablet 11    finasteride (PROSCAR) 5 mg tablet Take 1 tablet (5 mg total) by mouth once daily. 90 tablet 3    FLUoxetine 20 MG capsule TAKE ONE CAPSULE BY MOUTH EVERY MORNING 90 capsule 3    folic acid (FOLVITE) 1 MG tablet TAKE ONE TABLET BY MOUTH EVERY NIGHT 90 tablet 3    pravastatin (PRAVACHOL) 40 MG tablet Take 1 tablet (40 mg total) by mouth every evening. 30 tablet 11    traMADoL (ULTRAM) 50 mg tablet TAKE TWO TABLETS BY MOUTH THREE TIMES DAILY AS NEEDED FOR PAIN 180 tablet 0     No current facility-administered medications on file prior to visit.         OBJECTIVE:     Estimated body mass index is 20.41 kg/m² as calculated from the following:    Height as of this encounter: 6' 2" (1.88 m).    Weight as of this encounter: 72.1 kg (158 lb " "15.2 oz).    Vital Signs (Most Recent)  Vitals:    11/27/23 1024   BP: 126/68   Pulse: 75       Physical Exam:    Physical Exam     GENERAL: patient sitting comfortably  PSYCH: appropriate mood and affect      LABS:     Lab Results   Component Value Date    BUN 8 09/24/2023    CREATININE 0.7 09/24/2023    WBC 6.52 09/24/2023    HGB 10.9 (L) 09/24/2023    HCT 34.4 (L) 09/24/2023     09/24/2023    AST 25 09/22/2023    ALT 21 09/22/2023    ALKPHOS 64 09/22/2023    ALBUMIN 2.9 (L) 09/22/2023    HGBA1C 5.4 02/14/2022    INR 1.0 09/22/2023        Urinalysis:   No results found for: "UAREFLEX"     PSA:  Lab Results   Component Value Date    PSA 1.7 02/14/2022    PSA 2.0 06/23/2020    PSA 1.4 03/26/2019    PSA 1.7 10/14/2015       Testosterone:  No results found for: "TOTALTESTOST", "TESTOSTERONE"     Imaging:  I have personally reviewed all relevant imaging studies.    Results for orders placed or performed during the hospital encounter of 09/22/23 (from the past 2160 hour(s))   CT Head Without Contrast    Narrative    EXAMINATION:  CT HEAD WITHOUT CONTRAST    CLINICAL HISTORY:  Weakness;    TECHNIQUE:  Axial images obtained of brain without contrast    COMPARISON:  MRI brain 06/23/2020.    FINDINGS:  Patient is tilted in position for exam.    Moderate atrophy.  Patchy and confluent nonspecific hypodensities are evident in white matter and may reflect severe microvascular ischemic change.  Multiple old bilateral basal ganglia and thalamic infarcts noted with associated volume loss in as previously seen.    No acute intracranial hemorrhage, space-occupying mass or mass effect.    Mucosal thickening left maxillary sinus.  Remaining visualized paranasal sinuses and mastoid air cells are clear.      Impression    No acute intracranial hemorrhage or appreciable mass effect.    Moderate atrophy, severe presumed microvascular ischemic change cerebral white matter and multiple old bilateral basal ganglia and thalamic " infarcts with associated volume loss as previously seen.    If acute CVA is clinically suspected, MRI to better assess.      Electronically signed by: Patrizia Bernabe MD  Date:    09/22/2023  Time:    13:10     Results for orders placed or performed during the hospital encounter of 09/22/23 (from the past 2160 hour(s))   MRI Brain Without Contrast    Impression    No evidence of acute infarction.    Again demonstrated are moderate atrophy, severe presumed microvascular ischemic change white matter and central radha, multiple old bilateral basal ganglia and thalamic lacunar infarcts with associated volume loss basal ganglia, old deep white matter infarcts and multiple small old infarcts corpus callosum.      Electronically signed by: Patrizia Bernabe MD  Date:    09/22/2023  Time:    15:06     MRI Brain Without Contrast  Narrative: EXAMINATION:  MRI BRAIN WITHOUT CONTRAST    CLINICAL HISTORY:  Mental status change, unknown cause;    TECHNIQUE:  Brain studied using sagittal and axial T1, axial T2, FLAIR and DW Images.    COMPARISON:  Most recent is CT head 09/22/2023 with comparison MRI brain of 06/23/2020.    FINDINGS:  Evaluation is limited by motion artifact.    No evidence of acute infarction.    Moderate atrophy.  Fairly extensive patchy and confluent nonspecific T2 hyperintensities again noted cerebral white matter and may reflect severe microvascular ischemic change.  Multiple old bilateral basal ganglia and thalamic lacunar infarcts are present with associated volume loss.  Old subcentimeter infarcts also again noted corpus callosum and deep white matter.  Old lacunar infarcts with additional presumed microvascular ischemic change central radha.    No intracranial space-occupying mass or mass effect.    Mucosal thickening right frontal and left maxillary sinuses.  Impression: No evidence of acute infarction.    Again demonstrated are moderate atrophy, severe presumed microvascular ischemic change white matter and  central radha, multiple old bilateral basal ganglia and thalamic lacunar infarcts with associated volume loss basal ganglia, old deep white matter infarcts and multiple small old infarcts corpus callosum.    Electronically signed by: Patrizia Bernabe MD  Date:    09/22/2023  Time:    15:06  X-Ray Chest AP Portable  Narrative: EXAMINATION:  XR CHEST AP PORTABLE    CLINICAL HISTORY:  Altered mental status, unspecified    TECHNIQUE:  A single frontal chest radiograph was obtained and reviewed.    COMPARISON:  Chest radiographs from 06/12/2023 and 06/29/2020.    FINDINGS:  Cardiac monitoring leads overlie the chest and upper abdomen.  Allowing for patient rotation, the mediastinal structures appear midline. The cardiomediastinal silhouette appears within normal limits in caliber.    Patchy pulmonary opacities are present bilaterally most notably overlying the left mid and lower lung zones and the right lower lung zone concerning for pneumonia or aspiration.  No definite pleural fluid is detected on this single view.    The soft tissues and osseous structures demonstrate nothing acute.  Impression: Patchy pulmonary opacities overlying the lower lung zones and left mid lung zone concerning for pneumonia or aspiration.    Electronically signed by: Marsha Robles MD  Date:    09/22/2023  Time:    13:46  CT Head Without Contrast  Narrative: EXAMINATION:  CT HEAD WITHOUT CONTRAST    CLINICAL HISTORY:  Weakness;    TECHNIQUE:  Axial images obtained of brain without contrast    COMPARISON:  MRI brain 06/23/2020.    FINDINGS:  Patient is tilted in position for exam.    Moderate atrophy.  Patchy and confluent nonspecific hypodensities are evident in white matter and may reflect severe microvascular ischemic change.  Multiple old bilateral basal ganglia and thalamic infarcts noted with associated volume loss in as previously seen.    No acute intracranial hemorrhage, space-occupying mass or mass effect.    Mucosal thickening left  maxillary sinus.  Remaining visualized paranasal sinuses and mastoid air cells are clear.  Impression: No acute intracranial hemorrhage or appreciable mass effect.    Moderate atrophy, severe presumed microvascular ischemic change cerebral white matter and multiple old bilateral basal ganglia and thalamic infarcts with associated volume loss as previously seen.    If acute CVA is clinically suspected, MRI to better assess.    Electronically signed by: Patrizia Bernabe MD  Date:    09/22/2023  Time:    13:10     PROCEDURE:  We agreed to proceed with placement of patino catheter.  Pt. Was placed on exam table in supine position.  Genitalia prepped and draped. Lidocaine jelly used to anesthetise the urethra.  A 16 Fr. Patino catheter was passed atraumatically.  UOP on 75 cc.    ASSESSMENT     1. Incontinence when straining, male    2. Benign prostatic hyperplasia, unspecified whether lower urinary tract symptoms present        PLAN:     Patino catheter changes roughly every six weeks  Stop uroxatrol, continue finasteride for now.  RTC six weeks    Reymundo Oquendo MD  Urology  [facility]     Disclaimer: This note has been generated using voice-recognition software. There may be typographical errors that have been missed during proof-reading.

## 2024-01-01 ENCOUNTER — HOSPITAL ENCOUNTER (INPATIENT)
Facility: HOSPITAL | Age: 74
LOS: 8 days | Discharge: HOSPICE | DRG: 091 | End: 2024-11-07
Attending: STUDENT IN AN ORGANIZED HEALTH CARE EDUCATION/TRAINING PROGRAM | Admitting: STUDENT IN AN ORGANIZED HEALTH CARE EDUCATION/TRAINING PROGRAM
Payer: MEDICARE

## 2024-01-01 ENCOUNTER — HOSPITAL ENCOUNTER (INPATIENT)
Facility: HOSPITAL | Age: 74
LOS: 8 days | DRG: 951 | End: 2024-11-15
Attending: EMERGENCY MEDICINE | Admitting: EMERGENCY MEDICINE
Payer: OTHER MISCELLANEOUS

## 2024-01-01 VITALS
TEMPERATURE: 98 F | HEIGHT: 69 IN | BODY MASS INDEX: 22.66 KG/M2 | SYSTOLIC BLOOD PRESSURE: 85 MMHG | WEIGHT: 153 LBS | DIASTOLIC BLOOD PRESSURE: 50 MMHG | HEART RATE: 109 BPM | RESPIRATION RATE: 14 BRPM

## 2024-01-01 VITALS
HEART RATE: 122 BPM | DIASTOLIC BLOOD PRESSURE: 96 MMHG | TEMPERATURE: 99 F | HEIGHT: 69 IN | BODY MASS INDEX: 22.66 KG/M2 | SYSTOLIC BLOOD PRESSURE: 136 MMHG | WEIGHT: 153 LBS | OXYGEN SATURATION: 99 % | RESPIRATION RATE: 16 BRPM

## 2024-01-01 DIAGNOSIS — G40.901 STATUS EPILEPTICUS: ICD-10-CM

## 2024-01-01 DIAGNOSIS — J96.00 ACUTE RESPIRATORY FAILURE, UNSPECIFIED WHETHER WITH HYPOXIA OR HYPERCAPNIA: Primary | ICD-10-CM

## 2024-01-01 DIAGNOSIS — I46.9 CARDIAC ARREST: ICD-10-CM

## 2024-01-01 DIAGNOSIS — Z71.89 ACP (ADVANCE CARE PLANNING): ICD-10-CM

## 2024-01-01 DIAGNOSIS — Z51.5 COMFORT MEASURES ONLY STATUS: ICD-10-CM

## 2024-01-01 DIAGNOSIS — G93.1 ANOXIC BRAIN INJURY: ICD-10-CM

## 2024-01-01 DIAGNOSIS — G93.1 BRAIN HYPOXIC INJURY: ICD-10-CM

## 2024-01-01 PROBLEM — J18.9 PNEUMONIA OF BOTH LOWER LOBES DUE TO INFECTIOUS ORGANISM: Status: RESOLVED | Noted: 2023-09-22 | Resolved: 2024-01-01

## 2024-01-01 LAB
ALBUMIN SERPL BCP-MCNC: 2.2 G/DL (ref 3.5–5.2)
ALBUMIN SERPL BCP-MCNC: 2.2 G/DL (ref 3.5–5.2)
ALBUMIN SERPL BCP-MCNC: 2.6 G/DL (ref 3.5–5.2)
ALBUMIN SERPL BCP-MCNC: 2.8 G/DL (ref 3.5–5.2)
ALBUMIN SERPL BCP-MCNC: 2.9 G/DL (ref 3.5–5.2)
ALBUMIN SERPL BCP-MCNC: 3.3 G/DL (ref 3.5–5.2)
ALLENS TEST: ABNORMAL
ALP SERPL-CCNC: 76 U/L (ref 40–150)
ALP SERPL-CCNC: 77 U/L (ref 40–150)
ALP SERPL-CCNC: 79 U/L (ref 40–150)
ALP SERPL-CCNC: 81 U/L (ref 40–150)
ALP SERPL-CCNC: 84 U/L (ref 40–150)
ALP SERPL-CCNC: 85 U/L (ref 40–150)
ALP SERPL-CCNC: 85 U/L (ref 40–150)
ALP SERPL-CCNC: 89 U/L (ref 40–150)
ALT SERPL W/O P-5'-P-CCNC: 55 U/L (ref 10–44)
ALT SERPL W/O P-5'-P-CCNC: 57 U/L (ref 10–44)
ALT SERPL W/O P-5'-P-CCNC: 61 U/L (ref 10–44)
ALT SERPL W/O P-5'-P-CCNC: 62 U/L (ref 10–44)
ALT SERPL W/O P-5'-P-CCNC: 64 U/L (ref 10–44)
ALT SERPL W/O P-5'-P-CCNC: 64 U/L (ref 10–44)
ALT SERPL W/O P-5'-P-CCNC: 68 U/L (ref 10–44)
ALT SERPL W/O P-5'-P-CCNC: 83 U/L (ref 10–44)
AMORPH CRY UR QL COMP ASSIST: ABNORMAL
ANION GAP SERPL CALC-SCNC: 10 MMOL/L (ref 8–16)
ANION GAP SERPL CALC-SCNC: 12 MMOL/L (ref 8–16)
ANION GAP SERPL CALC-SCNC: 14 MMOL/L (ref 8–16)
ANION GAP SERPL CALC-SCNC: 5 MMOL/L (ref 8–16)
ANION GAP SERPL CALC-SCNC: 6 MMOL/L (ref 8–16)
ANION GAP SERPL CALC-SCNC: 6 MMOL/L (ref 8–16)
ANION GAP SERPL CALC-SCNC: 7 MMOL/L (ref 8–16)
ANION GAP SERPL CALC-SCNC: 8 MMOL/L (ref 8–16)
ANION GAP SERPL CALC-SCNC: 9 MMOL/L (ref 8–16)
ANION GAP SERPL CALC-SCNC: 9 MMOL/L (ref 8–16)
APTT PPP: 29.3 SEC (ref 21–32)
ASCENDING AORTA: 2.62 CM
AST SERPL-CCNC: 114 U/L (ref 10–40)
AST SERPL-CCNC: 115 U/L (ref 10–40)
AST SERPL-CCNC: 122 U/L (ref 10–40)
AST SERPL-CCNC: 142 U/L (ref 10–40)
AST SERPL-CCNC: 144 U/L (ref 10–40)
AST SERPL-CCNC: 145 U/L (ref 10–40)
AST SERPL-CCNC: 146 U/L (ref 10–40)
AST SERPL-CCNC: 96 U/L (ref 10–40)
AV AREA BY CONTINUOUS VTI: 2.6 CM2
AV INDEX (PROSTH): 0.89
AV LVOT MEAN GRADIENT: 1 MMHG
AV LVOT PEAK GRADIENT: 3 MMHG
AV MEAN GRADIENT: 4.8 MMHG
AV PEAK GRADIENT: 6.8 MMHG
AV VALVE AREA BY VELOCITY RATIO: 2 CM²
AV VALVE AREA: 2.5 CM²
AV VELOCITY RATIO: 0.69
BACTERIA #/AREA URNS AUTO: ABNORMAL /HPF
BACTERIA BLD CULT: NORMAL
BACTERIA BLD CULT: NORMAL
BACTERIA SPEC AEROBE CULT: NORMAL
BACTERIA SPEC AEROBE CULT: NORMAL
BACTERIA UR CULT: NO GROWTH
BASOPHILS # BLD AUTO: 0.01 K/UL (ref 0–0.2)
BASOPHILS # BLD AUTO: 0.01 K/UL (ref 0–0.2)
BASOPHILS # BLD AUTO: 0.02 K/UL (ref 0–0.2)
BASOPHILS NFR BLD: 0.1 % (ref 0–1.9)
BASOPHILS NFR BLD: 0.1 % (ref 0–1.9)
BASOPHILS NFR BLD: 0.3 % (ref 0–1.9)
BASOPHILS NFR BLD: 0.3 % (ref 0–1.9)
BASOPHILS NFR BLD: 0.4 % (ref 0–1.9)
BILIRUB SERPL-MCNC: 0.2 MG/DL (ref 0.1–1)
BILIRUB SERPL-MCNC: 0.3 MG/DL (ref 0.1–1)
BILIRUB UR QL STRIP: NEGATIVE
BSA FOR ECHO PROCEDURE: 1.78 M2
BUN SERPL-MCNC: 11 MG/DL (ref 8–23)
BUN SERPL-MCNC: 12 MG/DL (ref 8–23)
BUN SERPL-MCNC: 13 MG/DL (ref 8–23)
BUN SERPL-MCNC: 14 MG/DL (ref 8–23)
BUN SERPL-MCNC: 15 MG/DL (ref 8–23)
BUN SERPL-MCNC: 16 MG/DL (ref 8–23)
BUN SERPL-MCNC: 17 MG/DL (ref 8–23)
BUN SERPL-MCNC: 18 MG/DL (ref 8–23)
CALCIUM SERPL-MCNC: 8.5 MG/DL (ref 8.7–10.5)
CALCIUM SERPL-MCNC: 8.6 MG/DL (ref 8.7–10.5)
CALCIUM SERPL-MCNC: 8.7 MG/DL (ref 8.7–10.5)
CALCIUM SERPL-MCNC: 8.7 MG/DL (ref 8.7–10.5)
CALCIUM SERPL-MCNC: 8.8 MG/DL (ref 8.7–10.5)
CALCIUM SERPL-MCNC: 8.9 MG/DL (ref 8.7–10.5)
CALCIUM SERPL-MCNC: 9.2 MG/DL (ref 8.7–10.5)
CALCIUM SERPL-MCNC: 9.6 MG/DL (ref 8.7–10.5)
CHLORIDE SERPL-SCNC: 112 MMOL/L (ref 95–110)
CHLORIDE SERPL-SCNC: 113 MMOL/L (ref 95–110)
CHLORIDE SERPL-SCNC: 113 MMOL/L (ref 95–110)
CHLORIDE SERPL-SCNC: 115 MMOL/L (ref 95–110)
CHLORIDE SERPL-SCNC: 116 MMOL/L (ref 95–110)
CHLORIDE SERPL-SCNC: 117 MMOL/L (ref 95–110)
CHLORIDE SERPL-SCNC: 119 MMOL/L (ref 95–110)
CHLORIDE SERPL-SCNC: 119 MMOL/L (ref 95–110)
CK SERPL-CCNC: 1634 U/L (ref 20–200)
CK SERPL-CCNC: 1634 U/L (ref 20–200)
CK SERPL-CCNC: 1873 U/L (ref 20–200)
CK SERPL-CCNC: 1879 U/L (ref 20–200)
CK SERPL-CCNC: 2017 U/L (ref 20–200)
CK SERPL-CCNC: 2263 U/L (ref 20–200)
CK SERPL-CCNC: 2724 U/L (ref 20–200)
CK SERPL-CCNC: 2759 U/L (ref 20–200)
CLARITY UR REFRACT.AUTO: ABNORMAL
CO2 SERPL-SCNC: 17 MMOL/L (ref 23–29)
CO2 SERPL-SCNC: 18 MMOL/L (ref 23–29)
CO2 SERPL-SCNC: 19 MMOL/L (ref 23–29)
CO2 SERPL-SCNC: 20 MMOL/L (ref 23–29)
CO2 SERPL-SCNC: 21 MMOL/L (ref 23–29)
CO2 SERPL-SCNC: 23 MMOL/L (ref 23–29)
CO2 SERPL-SCNC: 26 MMOL/L (ref 23–29)
CO2 SERPL-SCNC: 30 MMOL/L (ref 23–29)
COLOR UR AUTO: ABNORMAL
CREAT SERPL-MCNC: 0.6 MG/DL (ref 0.5–1.4)
CREAT SERPL-MCNC: 0.6 MG/DL (ref 0.5–1.4)
CREAT SERPL-MCNC: 0.7 MG/DL (ref 0.5–1.4)
CREAT SERPL-MCNC: 0.8 MG/DL (ref 0.5–1.4)
CREAT SERPL-MCNC: 0.8 MG/DL (ref 0.5–1.4)
CREAT SERPL-MCNC: 0.9 MG/DL (ref 0.5–1.4)
CREAT SERPL-MCNC: 1.1 MG/DL (ref 0.5–1.4)
CV ECHO LV RWT: 0.53 CM
DELSYS: ABNORMAL
DIFFERENTIAL METHOD BLD: ABNORMAL
DOP CALC AO PEAK VEL: 1.3 M/S
DOP CALC AO VTI: 18.5 CM
DOP CALC LVOT AREA: 2.8 CM2
DOP CALC LVOT DIAMETER: 1.9 CM
DOP CALC LVOT PEAK VEL: 0.9 M/S
DOP CALC LVOT STROKE VOLUME: 46.8 CM3
DOP CALCLVOT PEAK VEL VTI: 16.5 CM
E WAVE DECELERATION TIME: 189.8 MS
E/A RATIO: 0.87
E/E' RATIO: 6.47 M/S
ECHO EF ESTIMATED: 55 %
ECHO LV POSTERIOR WALL: 1 CM (ref 0.6–1.1)
EJECTION FRACTION: 68 %
EOSINOPHIL # BLD AUTO: 0 K/UL (ref 0–0.5)
EOSINOPHIL # BLD AUTO: 0.1 K/UL (ref 0–0.5)
EOSINOPHIL # BLD AUTO: 0.1 K/UL (ref 0–0.5)
EOSINOPHIL NFR BLD: 0 % (ref 0–8)
EOSINOPHIL NFR BLD: 0 % (ref 0–8)
EOSINOPHIL NFR BLD: 0.3 % (ref 0–8)
EOSINOPHIL NFR BLD: 1.4 % (ref 0–8)
EOSINOPHIL NFR BLD: 1.9 % (ref 0–8)
ERYTHROCYTE [DISTWIDTH] IN BLOOD BY AUTOMATED COUNT: 20 % (ref 11.5–14.5)
ERYTHROCYTE [DISTWIDTH] IN BLOOD BY AUTOMATED COUNT: 20.3 % (ref 11.5–14.5)
ERYTHROCYTE [DISTWIDTH] IN BLOOD BY AUTOMATED COUNT: 20.4 % (ref 11.5–14.5)
ERYTHROCYTE [DISTWIDTH] IN BLOOD BY AUTOMATED COUNT: 20.6 % (ref 11.5–14.5)
ERYTHROCYTE [DISTWIDTH] IN BLOOD BY AUTOMATED COUNT: 20.9 % (ref 11.5–14.5)
ERYTHROCYTE [SEDIMENTATION RATE] IN BLOOD BY WESTERGREN METHOD: 16 MM/H
ERYTHROCYTE [SEDIMENTATION RATE] IN BLOOD BY WESTERGREN METHOD: 18 MM/H
ERYTHROCYTE [SEDIMENTATION RATE] IN BLOOD BY WESTERGREN METHOD: 20 MM/H
EST. GFR  (NO RACE VARIABLE): >60 ML/MIN/1.73 M^2
ESTIMATED AVG GLUCOSE: 103 MG/DL (ref 68–131)
FIO2: 40
FIO2: 50
FRACTIONAL SHORTENING: 28.9 % (ref 28–44)
GLUCOSE SERPL-MCNC: 108 MG/DL (ref 70–110)
GLUCOSE SERPL-MCNC: 113 MG/DL (ref 70–110)
GLUCOSE SERPL-MCNC: 118 MG/DL (ref 70–110)
GLUCOSE SERPL-MCNC: 120 MG/DL (ref 70–110)
GLUCOSE SERPL-MCNC: 123 MG/DL (ref 70–110)
GLUCOSE SERPL-MCNC: 123 MG/DL (ref 70–110)
GLUCOSE SERPL-MCNC: 126 MG/DL (ref 70–110)
GLUCOSE SERPL-MCNC: 131 MG/DL (ref 70–110)
GLUCOSE SERPL-MCNC: 139 MG/DL (ref 70–110)
GLUCOSE SERPL-MCNC: 163 MG/DL (ref 70–110)
GLUCOSE UR QL STRIP: NEGATIVE
GRAM STN SPEC: NORMAL
GRAM STN SPEC: NORMAL
HBA1C MFR BLD: 5.2 % (ref 4–5.6)
HCO3 UR-SCNC: 19.2 MMOL/L (ref 24–28)
HCO3 UR-SCNC: 21.6 MMOL/L (ref 24–28)
HCO3 UR-SCNC: 26.5 MMOL/L (ref 24–28)
HCO3 UR-SCNC: 30.6 MMOL/L (ref 24–28)
HCO3 UR-SCNC: 35.4 MMOL/L (ref 24–28)
HCT VFR BLD AUTO: 26.8 % (ref 40–54)
HCT VFR BLD AUTO: 26.8 % (ref 40–54)
HCT VFR BLD AUTO: 27.3 % (ref 40–54)
HCT VFR BLD AUTO: 27.7 % (ref 40–54)
HCT VFR BLD AUTO: 28.9 % (ref 40–54)
HCT VFR BLD CALC: 28 %PCV (ref 36–54)
HGB BLD-MCNC: 7.9 G/DL (ref 14–18)
HGB BLD-MCNC: 8.4 G/DL (ref 14–18)
HGB BLD-MCNC: 8.4 G/DL (ref 14–18)
HGB BLD-MCNC: 8.5 G/DL (ref 14–18)
HGB BLD-MCNC: 9 G/DL (ref 14–18)
HGB UR QL STRIP: ABNORMAL
HIV 1+2 AB+HIV1 P24 AG SERPL QL IA: NORMAL
HSV1 DNA SPEC QL NAA+PROBE: NEGATIVE
HSV2 DNA SPEC QL NAA+PROBE: NEGATIVE
IMM GRANULOCYTES # BLD AUTO: 0.02 K/UL (ref 0–0.04)
IMM GRANULOCYTES # BLD AUTO: 0.03 K/UL (ref 0–0.04)
IMM GRANULOCYTES # BLD AUTO: 0.04 K/UL (ref 0–0.04)
IMM GRANULOCYTES # BLD AUTO: 0.05 K/UL (ref 0–0.04)
IMM GRANULOCYTES # BLD AUTO: 0.09 K/UL (ref 0–0.04)
IMM GRANULOCYTES NFR BLD AUTO: 0.4 % (ref 0–0.5)
IMM GRANULOCYTES NFR BLD AUTO: 0.4 % (ref 0–0.5)
IMM GRANULOCYTES NFR BLD AUTO: 0.5 % (ref 0–0.5)
IMM GRANULOCYTES NFR BLD AUTO: 0.6 % (ref 0–0.5)
IMM GRANULOCYTES NFR BLD AUTO: 0.7 % (ref 0–0.5)
INR PPP: 1.1 (ref 0.8–1.2)
INR PPP: 1.1 (ref 0.8–1.2)
INTERVENTRICULAR SEPTUM: 0.7 CM (ref 0.6–1.1)
IVRT: 117.03 MS
KETONES UR QL STRIP: NEGATIVE
LA MAJOR: 5 CM
LA MINOR: 5.01 CM
LA WIDTH: 2.97 CM
LACTATE SERPL-SCNC: 2.1 MMOL/L (ref 0.5–2.2)
LACTATE SERPL-SCNC: 2.2 MMOL/L (ref 0.5–2.2)
LACTATE SERPL-SCNC: 4.8 MMOL/L (ref 0.5–2.2)
LEFT ATRIUM SIZE: 2.44 CM
LEFT ATRIUM VOLUME INDEX MOD: 19.3 ML/M2
LEFT ATRIUM VOLUME INDEX: 17.2 ML/M2
LEFT ATRIUM VOLUME MOD: 34.55 ML
LEFT ATRIUM VOLUME: 30.83 CM3
LEFT INTERNAL DIMENSION IN SYSTOLE: 2.7 CM (ref 2.1–4)
LEFT VENTRICLE DIASTOLIC VOLUME INDEX: 33.97 ML/M2
LEFT VENTRICLE DIASTOLIC VOLUME: 60.8 ML
LEFT VENTRICLE MASS INDEX: 52.2 G/M2
LEFT VENTRICLE SYSTOLIC VOLUME INDEX: 15.3 ML/M2
LEFT VENTRICLE SYSTOLIC VOLUME: 27.34 ML
LEFT VENTRICULAR INTERNAL DIMENSION IN DIASTOLE: 3.8 CM (ref 3.5–6)
LEFT VENTRICULAR MASS: 93.4 G
LEUKOCYTE ESTERASE UR QL STRIP: ABNORMAL
LV LATERAL E/E' RATIO: 6.88
LV SEPTAL E/E' RATIO: 6.11
LYMPHOCYTES # BLD AUTO: 0.7 K/UL (ref 1–4.8)
LYMPHOCYTES # BLD AUTO: 0.8 K/UL (ref 1–4.8)
LYMPHOCYTES # BLD AUTO: 0.9 K/UL (ref 1–4.8)
LYMPHOCYTES # BLD AUTO: 1 K/UL (ref 1–4.8)
LYMPHOCYTES # BLD AUTO: 1.1 K/UL (ref 1–4.8)
LYMPHOCYTES NFR BLD: 10.1 % (ref 18–48)
LYMPHOCYTES NFR BLD: 13.7 % (ref 18–48)
LYMPHOCYTES NFR BLD: 15.3 % (ref 18–48)
LYMPHOCYTES NFR BLD: 8.3 % (ref 18–48)
LYMPHOCYTES NFR BLD: 9.9 % (ref 18–48)
MAGNESIUM SERPL-MCNC: 1.6 MG/DL (ref 1.6–2.6)
MAGNESIUM SERPL-MCNC: 1.6 MG/DL (ref 1.6–2.6)
MAGNESIUM SERPL-MCNC: 1.7 MG/DL (ref 1.6–2.6)
MAGNESIUM SERPL-MCNC: 1.9 MG/DL (ref 1.6–2.6)
MAGNESIUM SERPL-MCNC: 2.1 MG/DL (ref 1.6–2.6)
MAGNESIUM SERPL-MCNC: 2.2 MG/DL (ref 1.6–2.6)
MCH RBC QN AUTO: 21.2 PG (ref 27–31)
MCH RBC QN AUTO: 21.4 PG (ref 27–31)
MCH RBC QN AUTO: 21.4 PG (ref 27–31)
MCH RBC QN AUTO: 21.5 PG (ref 27–31)
MCH RBC QN AUTO: 21.7 PG (ref 27–31)
MCHC RBC AUTO-ENTMCNC: 29.5 G/DL (ref 32–36)
MCHC RBC AUTO-ENTMCNC: 30.3 G/DL (ref 32–36)
MCHC RBC AUTO-ENTMCNC: 31.1 G/DL (ref 32–36)
MCHC RBC AUTO-ENTMCNC: 31.1 G/DL (ref 32–36)
MCHC RBC AUTO-ENTMCNC: 31.3 G/DL (ref 32–36)
MCV RBC AUTO: 68 FL (ref 82–98)
MCV RBC AUTO: 69 FL (ref 82–98)
MCV RBC AUTO: 70 FL (ref 82–98)
MCV RBC AUTO: 71 FL (ref 82–98)
MCV RBC AUTO: 72 FL (ref 82–98)
MICROSCOPIC COMMENT: ABNORMAL
MODE: ABNORMAL
MONOCYTES # BLD AUTO: 0.2 K/UL (ref 0.3–1)
MONOCYTES # BLD AUTO: 0.3 K/UL (ref 0.3–1)
MONOCYTES # BLD AUTO: 0.4 K/UL (ref 0.3–1)
MONOCYTES NFR BLD: 2.3 % (ref 4–15)
MONOCYTES NFR BLD: 2.9 % (ref 4–15)
MONOCYTES NFR BLD: 3.2 % (ref 4–15)
MONOCYTES NFR BLD: 3.3 % (ref 4–15)
MONOCYTES NFR BLD: 3.3 % (ref 4–15)
MV PEAK A VEL: 0.63 M/S
MV PEAK E VEL: 0.55 M/S
NEUTROPHILS # BLD AUTO: 11.3 K/UL (ref 1.8–7.7)
NEUTROPHILS # BLD AUTO: 4.2 K/UL (ref 1.8–7.7)
NEUTROPHILS # BLD AUTO: 6.1 K/UL (ref 1.8–7.7)
NEUTROPHILS # BLD AUTO: 6.1 K/UL (ref 1.8–7.7)
NEUTROPHILS # BLD AUTO: 7.6 K/UL (ref 1.8–7.7)
NEUTROPHILS NFR BLD: 78.8 % (ref 38–73)
NEUTROPHILS NFR BLD: 81.9 % (ref 38–73)
NEUTROPHILS NFR BLD: 84.7 % (ref 38–73)
NEUTROPHILS NFR BLD: 86.9 % (ref 38–73)
NEUTROPHILS NFR BLD: 88 % (ref 38–73)
NITRITE UR QL STRIP: NEGATIVE
NRBC BLD-RTO: 0 /100 WBC
NSE SERPL-MCNC: 104 NG/ML
OHS CV RV/LV RATIO: 0.61 CM
OHS QRS DURATION: 68 MS
OHS QRS DURATION: 70 MS
OHS QRS DURATION: 74 MS
OHS QRS DURATION: 74 MS
OHS QRS DURATION: 96 MS
OHS QTC CALCULATION: 436 MS
OHS QTC CALCULATION: 518 MS
OHS QTC CALCULATION: 547 MS
OHS QTC CALCULATION: 558 MS
OHS QTC CALCULATION: 572 MS
PCO2 BLDA: 31.9 MMHG (ref 35–45)
PCO2 BLDA: 34.8 MMHG (ref 35–45)
PCO2 BLDA: 45.1 MMHG (ref 35–45)
PCO2 BLDA: 45.9 MMHG (ref 35–45)
PCO2 BLDA: 52.5 MMHG (ref 35–45)
PEEP: 5
PH SMN: 7.35 [PH] (ref 7.35–7.45)
PH SMN: 7.37 [PH] (ref 7.35–7.45)
PH SMN: 7.38 [PH] (ref 7.35–7.45)
PH SMN: 7.44 [PH] (ref 7.35–7.45)
PH SMN: 7.5 [PH] (ref 7.35–7.45)
PH UR STRIP: 6 [PH] (ref 5–8)
PHOSPHATE SERPL-MCNC: 1.7 MG/DL (ref 2.7–4.5)
PHOSPHATE SERPL-MCNC: 2.2 MG/DL (ref 2.7–4.5)
PHOSPHATE SERPL-MCNC: 2.3 MG/DL (ref 2.7–4.5)
PHOSPHATE SERPL-MCNC: 2.4 MG/DL (ref 2.7–4.5)
PHOSPHATE SERPL-MCNC: 2.7 MG/DL (ref 2.7–4.5)
PHOSPHATE SERPL-MCNC: 2.9 MG/DL (ref 2.7–4.5)
PHOSPHATE SERPL-MCNC: 3.1 MG/DL (ref 2.7–4.5)
PHOSPHATE SERPL-MCNC: 3.3 MG/DL (ref 2.7–4.5)
PIP: 16
PLATELET # BLD AUTO: 130 K/UL (ref 150–450)
PLATELET # BLD AUTO: 140 K/UL (ref 150–450)
PLATELET # BLD AUTO: 144 K/UL (ref 150–450)
PLATELET # BLD AUTO: 157 K/UL (ref 150–450)
PLATELET # BLD AUTO: 177 K/UL (ref 150–450)
PMV BLD AUTO: ABNORMAL FL (ref 9.2–12.9)
PO2 BLDA: 116 MMHG (ref 80–100)
PO2 BLDA: 123 MMHG (ref 80–100)
PO2 BLDA: 132 MMHG (ref 80–100)
PO2 BLDA: 165 MMHG (ref 80–100)
PO2 BLDA: 182 MMHG (ref 80–100)
POC BE: -3 MMOL/L
POC BE: -6 MMOL/L
POC BE: 1 MMOL/L
POC BE: 12 MMOL/L
POC BE: 5 MMOL/L
POC IONIZED CALCIUM: 1.23 MMOL/L (ref 1.06–1.42)
POC IONIZED CALCIUM: 1.24 MMOL/L (ref 1.06–1.42)
POC IONIZED CALCIUM: 1.28 MMOL/L (ref 1.06–1.42)
POC SATURATED O2: 100 % (ref 95–100)
POC SATURATED O2: 100 % (ref 95–100)
POC SATURATED O2: 99 % (ref 95–100)
POC TCO2: 20 MMOL/L (ref 23–27)
POC TCO2: 23 MMOL/L (ref 23–27)
POC TCO2: 28 MMOL/L (ref 23–27)
POC TCO2: 32 MMOL/L (ref 23–27)
POC TCO2: 37 MMOL/L (ref 23–27)
POCT GLUCOSE: 109 MG/DL (ref 70–110)
POCT GLUCOSE: 116 MG/DL (ref 70–110)
POCT GLUCOSE: 132 MG/DL (ref 70–110)
POCT GLUCOSE: 134 MG/DL (ref 70–110)
POCT GLUCOSE: 99 MG/DL (ref 70–110)
POTASSIUM BLD-SCNC: 3.1 MMOL/L (ref 3.5–5.1)
POTASSIUM BLD-SCNC: 3.9 MMOL/L (ref 3.5–5.1)
POTASSIUM BLD-SCNC: 4 MMOL/L (ref 3.5–5.1)
POTASSIUM SERPL-SCNC: 3.2 MMOL/L (ref 3.5–5.1)
POTASSIUM SERPL-SCNC: 3.3 MMOL/L (ref 3.5–5.1)
POTASSIUM SERPL-SCNC: 3.3 MMOL/L (ref 3.5–5.1)
POTASSIUM SERPL-SCNC: 3.4 MMOL/L (ref 3.5–5.1)
POTASSIUM SERPL-SCNC: 3.6 MMOL/L (ref 3.5–5.1)
POTASSIUM SERPL-SCNC: 3.8 MMOL/L (ref 3.5–5.1)
POTASSIUM SERPL-SCNC: 3.8 MMOL/L (ref 3.5–5.1)
POTASSIUM SERPL-SCNC: 3.9 MMOL/L (ref 3.5–5.1)
POTASSIUM SERPL-SCNC: 4.1 MMOL/L (ref 3.5–5.1)
POTASSIUM SERPL-SCNC: 4.3 MMOL/L (ref 3.5–5.1)
PROT SERPL-MCNC: 5.4 G/DL (ref 6–8.4)
PROT SERPL-MCNC: 5.6 G/DL (ref 6–8.4)
PROT SERPL-MCNC: 5.9 G/DL (ref 6–8.4)
PROT SERPL-MCNC: 6 G/DL (ref 6–8.4)
PROT SERPL-MCNC: 6.1 G/DL (ref 6–8.4)
PROT SERPL-MCNC: 6.2 G/DL (ref 6–8.4)
PROT SERPL-MCNC: 6.3 G/DL (ref 6–8.4)
PROT SERPL-MCNC: 6.8 G/DL (ref 6–8.4)
PROT UR QL STRIP: ABNORMAL
PROTHROMBIN TIME: 12.2 SEC (ref 9–12.5)
PROTHROMBIN TIME: 12.4 SEC (ref 9–12.5)
RA MAJOR: 3.36 CM
RA PRESSURE ESTIMATED: 3 MMHG
RA WIDTH: 2.43 CM
RBC # BLD AUTO: 3.72 M/UL (ref 4.6–6.2)
RBC # BLD AUTO: 3.92 M/UL (ref 4.6–6.2)
RBC # BLD AUTO: 3.93 M/UL (ref 4.6–6.2)
RBC # BLD AUTO: 3.96 M/UL (ref 4.6–6.2)
RBC # BLD AUTO: 4.14 M/UL (ref 4.6–6.2)
RBC #/AREA URNS AUTO: >100 /HPF (ref 0–4)
RIGHT VENTRICLE DIASTOLIC BASEL DIMENSION: 2.3 CM
RPR SER QL: NORMAL
RV TISSUE DOPPLER FREE WALL SYSTOLIC VELOCITY 1 (APICAL 4 CHAMBER VIEW): 15.28 CM/S
SAMPLE: ABNORMAL
SINUS: 2.78 CM
SITE: ABNORMAL
SODIUM BLD-SCNC: 144 MMOL/L (ref 136–145)
SODIUM BLD-SCNC: 148 MMOL/L (ref 136–145)
SODIUM BLD-SCNC: 153 MMOL/L (ref 136–145)
SODIUM SERPL-SCNC: 139 MMOL/L (ref 136–145)
SODIUM SERPL-SCNC: 140 MMOL/L (ref 136–145)
SODIUM SERPL-SCNC: 142 MMOL/L (ref 136–145)
SODIUM SERPL-SCNC: 143 MMOL/L (ref 136–145)
SODIUM SERPL-SCNC: 143 MMOL/L (ref 136–145)
SODIUM SERPL-SCNC: 144 MMOL/L (ref 136–145)
SODIUM SERPL-SCNC: 145 MMOL/L (ref 136–145)
SODIUM SERPL-SCNC: 146 MMOL/L (ref 136–145)
SODIUM SERPL-SCNC: 146 MMOL/L (ref 136–145)
SODIUM SERPL-SCNC: 147 MMOL/L (ref 136–145)
SODIUM SERPL-SCNC: 147 MMOL/L (ref 136–145)
SODIUM SERPL-SCNC: 148 MMOL/L (ref 136–145)
SODIUM SERPL-SCNC: 149 MMOL/L (ref 136–145)
SODIUM SERPL-SCNC: 149 MMOL/L (ref 136–145)
SODIUM SERPL-SCNC: 150 MMOL/L (ref 136–145)
SODIUM SERPL-SCNC: 151 MMOL/L (ref 136–145)
SODIUM SERPL-SCNC: 154 MMOL/L (ref 136–145)
SP GR UR STRIP: 1.02 (ref 1–1.03)
SP02: 100
SP02: 100
SPECIMEN SOURCE: NORMAL
SQUAMOUS #/AREA URNS AUTO: 1 /HPF
STJ: 2.29 CM
TDI LATERAL: 0.08 M/S
TDI SEPTAL: 0.09 M/S
TDI: 0.09 M/S
TREPONEMA PALLIDUM IGG+IGM AB [PRESENCE] IN SERUM OR PLASMA BY IMMUNOASSAY: NONREACTIVE
TRICUSPID ANNULAR PLANE SYSTOLIC EXCURSION: 2.24 CM
TROPONIN I SERPL DL<=0.01 NG/ML-MCNC: 0.47 NG/ML (ref 0–0.03)
TROPONIN I SERPL DL<=0.01 NG/ML-MCNC: 0.72 NG/ML (ref 0–0.03)
TROPONIN I SERPL DL<=0.01 NG/ML-MCNC: 0.85 NG/ML (ref 0–0.03)
TROPONIN I SERPL DL<=0.01 NG/ML-MCNC: 1.38 NG/ML (ref 0–0.03)
URATE CRY UR QL COMP ASSIST: ABNORMAL
URN SPEC COLLECT METH UR: ABNORMAL
VT: 450
VT: 480
WBC # BLD AUTO: 12.86 K/UL (ref 3.9–12.7)
WBC # BLD AUTO: 5.28 K/UL (ref 3.9–12.7)
WBC # BLD AUTO: 7.24 K/UL (ref 3.9–12.7)
WBC # BLD AUTO: 7.47 K/UL (ref 3.9–12.7)
WBC # BLD AUTO: 8.74 K/UL (ref 3.9–12.7)
WBC #/AREA URNS AUTO: >100 /HPF (ref 0–5)
WBC CLUMPS UR QL AUTO: ABNORMAL
Z-SCORE OF LEFT VENTRICULAR DIMENSION IN END DIASTOLE: -2.64
Z-SCORE OF LEFT VENTRICULAR DIMENSION IN END SYSTOLE: -0.99

## 2024-01-01 PROCEDURE — 63600175 PHARM REV CODE 636 W HCPCS: Performed by: EMERGENCY MEDICINE

## 2024-01-01 PROCEDURE — 81001 URINALYSIS AUTO W/SCOPE: CPT

## 2024-01-01 PROCEDURE — 25000003 PHARM REV CODE 250: Performed by: PHYSICIAN ASSISTANT

## 2024-01-01 PROCEDURE — 99233 SBSQ HOSP IP/OBS HIGH 50: CPT | Mod: GV,,, | Performed by: STUDENT IN AN ORGANIZED HEALTH CARE EDUCATION/TRAINING PROGRAM

## 2024-01-01 PROCEDURE — 94003 VENT MGMT INPAT SUBQ DAY: CPT

## 2024-01-01 PROCEDURE — 85730 THROMBOPLASTIN TIME PARTIAL: CPT

## 2024-01-01 PROCEDURE — 63600175 PHARM REV CODE 636 W HCPCS: Performed by: PSYCHIATRY & NEUROLOGY

## 2024-01-01 PROCEDURE — 63600175 PHARM REV CODE 636 W HCPCS

## 2024-01-01 PROCEDURE — 85025 COMPLETE CBC W/AUTO DIFF WBC: CPT

## 2024-01-01 PROCEDURE — 84295 ASSAY OF SERUM SODIUM: CPT | Mod: 91

## 2024-01-01 PROCEDURE — 11000001 HC ACUTE MED/SURG PRIVATE ROOM

## 2024-01-01 PROCEDURE — 25000003 PHARM REV CODE 250

## 2024-01-01 PROCEDURE — 99223 1ST HOSP IP/OBS HIGH 75: CPT | Mod: ,,, | Performed by: EMERGENCY MEDICINE

## 2024-01-01 PROCEDURE — 20000000 HC ICU ROOM

## 2024-01-01 PROCEDURE — 99900035 HC TECH TIME PER 15 MIN (STAT)

## 2024-01-01 PROCEDURE — 25000003 PHARM REV CODE 250: Performed by: STUDENT IN AN ORGANIZED HEALTH CARE EDUCATION/TRAINING PROGRAM

## 2024-01-01 PROCEDURE — 99291 CRITICAL CARE FIRST HOUR: CPT | Mod: GC,,, | Performed by: PSYCHIATRY & NEUROLOGY

## 2024-01-01 PROCEDURE — 83735 ASSAY OF MAGNESIUM: CPT | Mod: 91

## 2024-01-01 PROCEDURE — 94761 N-INVAS EAR/PLS OXIMETRY MLT: CPT | Mod: XB

## 2024-01-01 PROCEDURE — 84295 ASSAY OF SERUM SODIUM: CPT

## 2024-01-01 PROCEDURE — A4217 STERILE WATER/SALINE, 500 ML: HCPCS

## 2024-01-01 PROCEDURE — 82550 ASSAY OF CK (CPK): CPT | Mod: 91

## 2024-01-01 PROCEDURE — 27100171 HC OXYGEN HIGH FLOW UP TO 24 HOURS

## 2024-01-01 PROCEDURE — 93005 ELECTROCARDIOGRAM TRACING: CPT

## 2024-01-01 PROCEDURE — 83036 HEMOGLOBIN GLYCOSYLATED A1C: CPT

## 2024-01-01 PROCEDURE — 99238 HOSP IP/OBS DSCHRG MGMT 30/<: CPT | Mod: FS,,, | Performed by: PSYCHIATRY & NEUROLOGY

## 2024-01-01 PROCEDURE — 89220 SPUTUM SPECIMEN COLLECTION: CPT

## 2024-01-01 PROCEDURE — 80053 COMPREHEN METABOLIC PANEL: CPT

## 2024-01-01 PROCEDURE — 84100 ASSAY OF PHOSPHORUS: CPT

## 2024-01-01 PROCEDURE — 85610 PROTHROMBIN TIME: CPT

## 2024-01-01 PROCEDURE — 99900026 HC AIRWAY MAINTENANCE (STAT)

## 2024-01-01 PROCEDURE — 37799 UNLISTED PX VASCULAR SURGERY: CPT

## 2024-01-01 PROCEDURE — 82550 ASSAY OF CK (CPK): CPT

## 2024-01-01 PROCEDURE — 87070 CULTURE OTHR SPECIMN AEROBIC: CPT

## 2024-01-01 PROCEDURE — 87389 HIV-1 AG W/HIV-1&-2 AB AG IA: CPT | Performed by: PHYSICIAN ASSISTANT

## 2024-01-01 PROCEDURE — 83735 ASSAY OF MAGNESIUM: CPT

## 2024-01-01 PROCEDURE — 95714 VEEG EA 12-26 HR UNMNTR: CPT

## 2024-01-01 PROCEDURE — 99233 SBSQ HOSP IP/OBS HIGH 50: CPT | Mod: ,,, | Performed by: EMERGENCY MEDICINE

## 2024-01-01 PROCEDURE — 84100 ASSAY OF PHOSPHORUS: CPT | Mod: 91

## 2024-01-01 PROCEDURE — 25000003 PHARM REV CODE 250: Performed by: EMERGENCY MEDICINE

## 2024-01-01 PROCEDURE — 36600 WITHDRAWAL OF ARTERIAL BLOOD: CPT

## 2024-01-01 PROCEDURE — 80053 COMPREHEN METABOLIC PANEL: CPT | Mod: 91

## 2024-01-01 PROCEDURE — 86592 SYPHILIS TEST NON-TREP QUAL: CPT

## 2024-01-01 PROCEDURE — 82803 BLOOD GASES ANY COMBINATION: CPT

## 2024-01-01 PROCEDURE — 83735 ASSAY OF MAGNESIUM: CPT | Performed by: PHYSICIAN ASSISTANT

## 2024-01-01 PROCEDURE — 87040 BLOOD CULTURE FOR BACTERIA: CPT | Mod: 59

## 2024-01-01 PROCEDURE — 80048 BASIC METABOLIC PNL TOTAL CA: CPT | Mod: 91,XB

## 2024-01-01 PROCEDURE — A4217 STERILE WATER/SALINE, 500 ML: HCPCS | Performed by: PHYSICIAN ASSISTANT

## 2024-01-01 PROCEDURE — 63600175 PHARM REV CODE 636 W HCPCS: Mod: JZ,JG | Performed by: PHYSICIAN ASSISTANT

## 2024-01-01 PROCEDURE — 87529 HSV DNA AMP PROBE: CPT | Mod: 59

## 2024-01-01 PROCEDURE — 99499 UNLISTED E&M SERVICE: CPT | Mod: ,,, | Performed by: PHYSICIAN ASSISTANT

## 2024-01-01 PROCEDURE — A4216 STERILE WATER/SALINE, 10 ML: HCPCS | Performed by: EMERGENCY MEDICINE

## 2024-01-01 PROCEDURE — 94799 UNLISTED PULMONARY SVC/PX: CPT

## 2024-01-01 PROCEDURE — 83520 IMMUNOASSAY QUANT NOS NONAB: CPT

## 2024-01-01 PROCEDURE — 95720 EEG PHY/QHP EA INCR W/VEEG: CPT | Mod: ,,, | Performed by: STUDENT IN AN ORGANIZED HEALTH CARE EDUCATION/TRAINING PROGRAM

## 2024-01-01 PROCEDURE — 84484 ASSAY OF TROPONIN QUANT: CPT | Mod: 91

## 2024-01-01 PROCEDURE — 94002 VENT MGMT INPAT INIT DAY: CPT

## 2024-01-01 PROCEDURE — 99291 CRITICAL CARE FIRST HOUR: CPT | Mod: ,,,

## 2024-01-01 PROCEDURE — 84100 ASSAY OF PHOSPHORUS: CPT | Performed by: PHYSICIAN ASSISTANT

## 2024-01-01 PROCEDURE — 87086 URINE CULTURE/COLONY COUNT: CPT

## 2024-01-01 PROCEDURE — 63600175 PHARM REV CODE 636 W HCPCS: Performed by: PHYSICIAN ASSISTANT

## 2024-01-01 PROCEDURE — 86593 SYPHILIS TEST NON-TREP QUANT: CPT | Performed by: PHYSICIAN ASSISTANT

## 2024-01-01 PROCEDURE — 99233 SBSQ HOSP IP/OBS HIGH 50: CPT | Mod: ,,, | Performed by: STUDENT IN AN ORGANIZED HEALTH CARE EDUCATION/TRAINING PROGRAM

## 2024-01-01 PROCEDURE — 5A1955Z RESPIRATORY VENTILATION, GREATER THAN 96 CONSECUTIVE HOURS: ICD-10-PCS | Performed by: STUDENT IN AN ORGANIZED HEALTH CARE EDUCATION/TRAINING PROGRAM

## 2024-01-01 PROCEDURE — 95700 EEG CONT REC W/VID EEG TECH: CPT

## 2024-01-01 PROCEDURE — 63600175 PHARM REV CODE 636 W HCPCS: Mod: JZ,JG

## 2024-01-01 PROCEDURE — 87205 SMEAR GRAM STAIN: CPT

## 2024-01-01 PROCEDURE — 94761 N-INVAS EAR/PLS OXIMETRY MLT: CPT

## 2024-01-01 PROCEDURE — 83605 ASSAY OF LACTIC ACID: CPT

## 2024-01-01 PROCEDURE — 27200966 HC CLOSED SUCTION SYSTEM

## 2024-01-01 PROCEDURE — 99223 1ST HOSP IP/OBS HIGH 75: CPT | Mod: GC,,, | Performed by: STUDENT IN AN ORGANIZED HEALTH CARE EDUCATION/TRAINING PROGRAM

## 2024-01-01 PROCEDURE — 27201640 HC PAD, ARTICGEL

## 2024-01-01 RX ORDER — ACETAMINOPHEN 500 MG
1000 TABLET ORAL EVERY 6 HOURS PRN
Status: DISCONTINUED | OUTPATIENT
Start: 2024-01-01 | End: 2024-01-01

## 2024-01-01 RX ORDER — LABETALOL HCL 20 MG/4 ML
10 SYRINGE (ML) INTRAVENOUS EVERY 6 HOURS PRN
Status: DISCONTINUED | OUTPATIENT
Start: 2024-01-01 | End: 2024-01-01

## 2024-01-01 RX ORDER — MORPHINE SULFATE 1 MG/ML
0-10 INJECTION, SOLUTION INTRAVENOUS CONTINUOUS
Status: DISCONTINUED | OUTPATIENT
Start: 2024-01-01 | End: 2024-01-01

## 2024-01-01 RX ORDER — SODIUM,POTASSIUM PHOSPHATES 280-250MG
2 POWDER IN PACKET (EA) ORAL
Status: DISCONTINUED | OUTPATIENT
Start: 2024-01-01 | End: 2024-01-01

## 2024-01-01 RX ORDER — FENTANYL CITRATE 50 UG/ML
50 INJECTION, SOLUTION INTRAMUSCULAR; INTRAVENOUS
Status: DISCONTINUED | OUTPATIENT
Start: 2024-01-01 | End: 2024-01-01

## 2024-01-01 RX ORDER — HALOPERIDOL 5 MG/ML
2 INJECTION INTRAMUSCULAR EVERY 4 HOURS PRN
Status: CANCELLED | OUTPATIENT
Start: 2024-01-01

## 2024-01-01 RX ORDER — AMOXICILLIN 250 MG
2 CAPSULE ORAL 2 TIMES DAILY
Status: DISCONTINUED | OUTPATIENT
Start: 2024-01-01 | End: 2024-01-01

## 2024-01-01 RX ORDER — HALOPERIDOL 5 MG/ML
2 INJECTION INTRAMUSCULAR EVERY 4 HOURS PRN
Status: DISCONTINUED | OUTPATIENT
Start: 2024-01-01 | End: 2024-01-01 | Stop reason: HOSPADM

## 2024-01-01 RX ORDER — HYDRALAZINE HYDROCHLORIDE 20 MG/ML
INJECTION INTRAMUSCULAR; INTRAVENOUS
Status: COMPLETED
Start: 2024-01-01 | End: 2024-01-01

## 2024-01-01 RX ORDER — LANOLIN ALCOHOL/MO/W.PET/CERES
800 CREAM (GRAM) TOPICAL
Status: DISCONTINUED | OUTPATIENT
Start: 2024-01-01 | End: 2024-01-01

## 2024-01-01 RX ORDER — LORAZEPAM 2 MG/ML
1 INJECTION INTRAMUSCULAR EVERY 4 HOURS PRN
Status: CANCELLED | OUTPATIENT
Start: 2024-01-01

## 2024-01-01 RX ORDER — HALOPERIDOL 2 MG/ML
2 SOLUTION ORAL EVERY 4 HOURS PRN
Status: DISCONTINUED | OUTPATIENT
Start: 2024-01-01 | End: 2024-01-01

## 2024-01-01 RX ORDER — FINASTERIDE 1 MG/1
1 TABLET, FILM COATED ORAL DAILY
Status: DISCONTINUED | OUTPATIENT
Start: 2024-01-01 | End: 2024-01-01

## 2024-01-01 RX ORDER — LORAZEPAM 2 MG/ML
1 INJECTION INTRAMUSCULAR EVERY 4 HOURS PRN
Status: DISCONTINUED | OUTPATIENT
Start: 2024-01-01 | End: 2024-01-01

## 2024-01-01 RX ORDER — DIAZEPAM 10 MG/2ML
5 INJECTION INTRAMUSCULAR EVERY 8 HOURS
Status: CANCELLED | OUTPATIENT
Start: 2024-01-01

## 2024-01-01 RX ORDER — ACETAMINOPHEN 325 MG/1
650 TABLET ORAL EVERY 6 HOURS PRN
Status: DISCONTINUED | OUTPATIENT
Start: 2024-01-01 | End: 2024-01-01

## 2024-01-01 RX ORDER — MEROPENEM 1 G/1
1 INJECTION, POWDER, FOR SOLUTION INTRAVENOUS
Status: DISCONTINUED | OUTPATIENT
Start: 2024-01-01 | End: 2024-01-01

## 2024-01-01 RX ORDER — HYDRALAZINE HYDROCHLORIDE 20 MG/ML
10 INJECTION INTRAMUSCULAR; INTRAVENOUS EVERY 4 HOURS PRN
Status: DISCONTINUED | OUTPATIENT
Start: 2024-01-01 | End: 2024-01-01

## 2024-01-01 RX ORDER — MORPHINE SULFATE 1 MG/ML
0-10 INJECTION, SOLUTION INTRAVENOUS CONTINUOUS
Status: CANCELLED | OUTPATIENT
Start: 2024-01-01

## 2024-01-01 RX ORDER — BISACODYL 10 MG/1
10 SUPPOSITORY RECTAL DAILY
Status: DISCONTINUED | OUTPATIENT
Start: 2024-01-01 | End: 2024-01-01

## 2024-01-01 RX ORDER — DIAZEPAM 10 MG/2ML
5 INJECTION INTRAMUSCULAR EVERY 8 HOURS
Status: DISCONTINUED | OUTPATIENT
Start: 2024-01-01 | End: 2024-01-01 | Stop reason: HOSPADM

## 2024-01-01 RX ORDER — HEPARIN SODIUM 5000 [USP'U]/ML
5000 INJECTION, SOLUTION INTRAVENOUS; SUBCUTANEOUS EVERY 8 HOURS
Status: DISCONTINUED | OUTPATIENT
Start: 2024-01-01 | End: 2024-01-01

## 2024-01-01 RX ORDER — SODIUM CHLORIDE 0.9 % (FLUSH) 0.9 %
10 SYRINGE (ML) INJECTION
Status: CANCELLED | OUTPATIENT
Start: 2024-01-01

## 2024-01-01 RX ORDER — FENTANYL CITRATE 50 UG/ML
100 INJECTION, SOLUTION INTRAMUSCULAR; INTRAVENOUS ONCE
Status: COMPLETED | OUTPATIENT
Start: 2024-01-01 | End: 2024-01-01

## 2024-01-01 RX ORDER — SCOLOPAMINE TRANSDERMAL SYSTEM 1 MG/1
1 PATCH, EXTENDED RELEASE TRANSDERMAL
Status: CANCELLED | OUTPATIENT
Start: 2024-01-01

## 2024-01-01 RX ORDER — BISACODYL 10 MG/1
10 SUPPOSITORY RECTAL DAILY PRN
Status: DISCONTINUED | OUTPATIENT
Start: 2024-01-01 | End: 2024-01-01 | Stop reason: HOSPADM

## 2024-01-01 RX ORDER — SODIUM CHLORIDE 0.9 % (FLUSH) 0.9 %
10 SYRINGE (ML) INJECTION
Status: DISCONTINUED | OUTPATIENT
Start: 2024-01-01 | End: 2024-01-01 | Stop reason: HOSPADM

## 2024-01-01 RX ORDER — TIMOLOL MALEATE 5 MG/ML
1 SOLUTION/ DROPS OPHTHALMIC 2 TIMES DAILY
Status: DISCONTINUED | OUTPATIENT
Start: 2024-01-01 | End: 2024-01-01

## 2024-01-01 RX ORDER — POLYETHYLENE GLYCOL 3350 17 G/17G
17 POWDER, FOR SOLUTION ORAL DAILY
Status: DISCONTINUED | OUTPATIENT
Start: 2024-01-01 | End: 2024-01-01

## 2024-01-01 RX ORDER — AMOXICILLIN 250 MG
1 CAPSULE ORAL 2 TIMES DAILY
Status: DISCONTINUED | OUTPATIENT
Start: 2024-01-01 | End: 2024-01-01

## 2024-01-01 RX ORDER — FLUOXETINE 20 MG/5ML
20 SOLUTION ORAL DAILY
Status: DISCONTINUED | OUTPATIENT
Start: 2024-01-01 | End: 2024-01-01

## 2024-01-01 RX ORDER — SILODOSIN 4 MG/1
4 CAPSULE ORAL DAILY
Status: DISCONTINUED | OUTPATIENT
Start: 2024-01-01 | End: 2024-01-01

## 2024-01-01 RX ORDER — SCOLOPAMINE TRANSDERMAL SYSTEM 1 MG/1
1 PATCH, EXTENDED RELEASE TRANSDERMAL
Status: DISCONTINUED | OUTPATIENT
Start: 2024-01-01 | End: 2024-01-01 | Stop reason: HOSPADM

## 2024-01-01 RX ORDER — FENTANYL CITRATE-0.9 % NACL/PF 10 MCG/ML
0-150 PLASTIC BAG, INJECTION (ML) INTRAVENOUS CONTINUOUS
Status: DISCONTINUED | OUTPATIENT
Start: 2024-01-01 | End: 2024-01-01

## 2024-01-01 RX ORDER — FINASTERIDE 5 MG/1
5 TABLET, FILM COATED ORAL DAILY
Status: CANCELLED | OUTPATIENT
Start: 2024-01-01

## 2024-01-01 RX ORDER — ENOXAPARIN SODIUM 100 MG/ML
40 INJECTION SUBCUTANEOUS EVERY 24 HOURS
Status: DISCONTINUED | OUTPATIENT
Start: 2024-01-01 | End: 2024-01-01

## 2024-01-01 RX ORDER — SODIUM CHLORIDE 9 MG/ML
INJECTION, SOLUTION INTRAVENOUS CONTINUOUS
Status: ACTIVE | OUTPATIENT
Start: 2024-01-01 | End: 2024-01-01

## 2024-01-01 RX ORDER — LORAZEPAM 2 MG/ML
1 INJECTION INTRAMUSCULAR EVERY 4 HOURS PRN
Status: DISCONTINUED | OUTPATIENT
Start: 2024-01-01 | End: 2024-01-01 | Stop reason: HOSPADM

## 2024-01-01 RX ORDER — BISACODYL 10 MG/1
10 SUPPOSITORY RECTAL DAILY PRN
Status: DISCONTINUED | OUTPATIENT
Start: 2024-01-01 | End: 2024-01-01

## 2024-01-01 RX ORDER — FENTANYL CITRATE 50 UG/ML
INJECTION, SOLUTION INTRAMUSCULAR; INTRAVENOUS
Status: COMPLETED
Start: 2024-01-01 | End: 2024-01-01

## 2024-01-01 RX ORDER — ACETAMINOPHEN 325 MG/1
650 TABLET ORAL EVERY 6 HOURS
Status: DISCONTINUED | OUTPATIENT
Start: 2024-01-01 | End: 2024-01-01

## 2024-01-01 RX ORDER — PRAVASTATIN SODIUM 20 MG/1
40 TABLET ORAL DAILY
Status: DISCONTINUED | OUTPATIENT
Start: 2024-01-01 | End: 2024-01-01

## 2024-01-01 RX ORDER — FAMOTIDINE 10 MG/ML
20 INJECTION INTRAVENOUS 2 TIMES DAILY
Status: DISCONTINUED | OUTPATIENT
Start: 2024-01-01 | End: 2024-01-01

## 2024-01-01 RX ORDER — DORZOLAMIDE HCL 20 MG/ML
1 SOLUTION/ DROPS OPHTHALMIC 2 TIMES DAILY
Status: DISCONTINUED | OUTPATIENT
Start: 2024-01-01 | End: 2024-01-01

## 2024-01-01 RX ORDER — FAMOTIDINE 20 MG/1
20 TABLET, FILM COATED ORAL 2 TIMES DAILY
Status: DISCONTINUED | OUTPATIENT
Start: 2024-01-01 | End: 2024-01-01

## 2024-01-01 RX ORDER — BISACODYL 10 MG/1
10 SUPPOSITORY RECTAL DAILY PRN
Status: CANCELLED | OUTPATIENT
Start: 2024-01-01

## 2024-01-01 RX ORDER — MORPHINE SULFATE/0.9% NACL/PF 1 MG/ML
5 PLASTIC BAG, INJECTION (ML) INTRAVENOUS CONTINUOUS
Status: DISCONTINUED | OUTPATIENT
Start: 2024-01-01 | End: 2024-01-01 | Stop reason: HOSPADM

## 2024-01-01 RX ORDER — POLYETHYLENE GLYCOL 3350 17 G/17G
17 POWDER, FOR SOLUTION ORAL 2 TIMES DAILY
Status: DISCONTINUED | OUTPATIENT
Start: 2024-01-01 | End: 2024-01-01

## 2024-01-01 RX ORDER — MORPHINE SULFATE 1 MG/ML
0-10 INJECTION, SOLUTION INTRAVENOUS CONTINUOUS
Status: DISCONTINUED | OUTPATIENT
Start: 2024-01-01 | End: 2024-01-01 | Stop reason: HOSPADM

## 2024-01-01 RX ORDER — AMLODIPINE BESYLATE 10 MG/1
10 TABLET ORAL DAILY
Status: DISCONTINUED | OUTPATIENT
Start: 2024-01-01 | End: 2024-01-01

## 2024-01-01 RX ORDER — FENTANYL CITRATE 50 UG/ML
INJECTION, SOLUTION INTRAMUSCULAR; INTRAVENOUS
Status: DISPENSED
Start: 2024-01-01 | End: 2024-01-01

## 2024-01-01 RX ORDER — CEFTRIAXONE 1 G/1
1 INJECTION, POWDER, FOR SOLUTION INTRAMUSCULAR; INTRAVENOUS
Status: DISCONTINUED | OUTPATIENT
Start: 2024-01-01 | End: 2024-01-01

## 2024-01-01 RX ORDER — MORPHINE SULFATE 2 MG/ML
4 INJECTION, SOLUTION INTRAMUSCULAR; INTRAVENOUS
Status: DISPENSED | OUTPATIENT
Start: 2024-01-01 | End: 2024-01-01

## 2024-01-01 RX ADMIN — FENTANYL CITRATE 50 MCG: 50 INJECTION INTRAMUSCULAR; INTRAVENOUS at 03:10

## 2024-01-01 RX ADMIN — MEROPENEM 1 G: 1 INJECTION INTRAVENOUS at 12:11

## 2024-01-01 RX ADMIN — HYPROMELLOSE 2910 1 DROP: 5 SOLUTION/ DROPS OPHTHALMIC at 02:11

## 2024-01-01 RX ADMIN — HYPROMELLOSE 2910 1 DROP: 5 SOLUTION/ DROPS OPHTHALMIC at 09:11

## 2024-01-01 RX ADMIN — MEROPENEM 1 G: 1 INJECTION INTRAVENOUS at 08:11

## 2024-01-01 RX ADMIN — SENNOSIDES AND DOCUSATE SODIUM 2 TABLET: 50; 8.6 TABLET ORAL at 08:11

## 2024-01-01 RX ADMIN — PRAVASTATIN SODIUM 40 MG: 20 TABLET ORAL at 08:11

## 2024-01-01 RX ADMIN — FENTANYL CITRATE 50 MCG: 50 INJECTION INTRAMUSCULAR; INTRAVENOUS at 10:10

## 2024-01-01 RX ADMIN — PROPOFOL 25 MCG/KG/MIN: 10 INJECTION, EMULSION INTRAVENOUS at 06:10

## 2024-01-01 RX ADMIN — POLYETHYLENE GLYCOL 3350 17 G: 17 POWDER, FOR SOLUTION ORAL at 08:11

## 2024-01-01 RX ADMIN — DIAZEPAM 5 MG: 5 INJECTION, SOLUTION INTRAMUSCULAR; INTRAVENOUS at 10:11

## 2024-01-01 RX ADMIN — POLYETHYLENE GLYCOL 3350 17 G: 17 POWDER, FOR SOLUTION ORAL at 08:10

## 2024-01-01 RX ADMIN — BISACODYL 10 MG: 10 SUPPOSITORY RECTAL at 08:11

## 2024-01-01 RX ADMIN — FENTANYL CITRATE 50 MCG: 50 INJECTION INTRAMUSCULAR; INTRAVENOUS at 02:10

## 2024-01-01 RX ADMIN — HYPROMELLOSE 2910 1 DROP: 5 SOLUTION/ DROPS OPHTHALMIC at 06:11

## 2024-01-01 RX ADMIN — FENTANYL CITRATE 50 MCG: 50 INJECTION INTRAMUSCULAR; INTRAVENOUS at 08:10

## 2024-01-01 RX ADMIN — DORZOLAMIDE HYDROCHLORIDE 1 DROP: 20 SOLUTION OPHTHALMIC at 08:11

## 2024-01-01 RX ADMIN — MEROPENEM 1 G: 1 INJECTION INTRAVENOUS at 12:10

## 2024-01-01 RX ADMIN — MINERAL OIL AND WHITE PETROLATUM: 30; 940 OINTMENT OPHTHALMIC at 01:11

## 2024-01-01 RX ADMIN — SODIUM CHLORIDE, POTASSIUM CHLORIDE, SODIUM LACTATE AND CALCIUM CHLORIDE 500 ML: 600; 310; 30; 20 INJECTION, SOLUTION INTRAVENOUS at 05:10

## 2024-01-01 RX ADMIN — SILODOSIN 4 MG: 4 CAPSULE ORAL at 08:10

## 2024-01-01 RX ADMIN — FAMOTIDINE 20 MG: 20 TABLET ORAL at 08:11

## 2024-01-01 RX ADMIN — MEROPENEM 1 G: 1 INJECTION INTRAVENOUS at 08:10

## 2024-01-01 RX ADMIN — HYPROMELLOSE 2910 1 DROP: 5 SOLUTION OPHTHALMIC at 02:11

## 2024-01-01 RX ADMIN — Medication 3 MG/HR: at 09:11

## 2024-01-01 RX ADMIN — Medication 0.5 MG/HR: at 06:11

## 2024-01-01 RX ADMIN — PROPOFOL 25 MCG/KG/MIN: 10 INJECTION, EMULSION INTRAVENOUS at 12:10

## 2024-01-01 RX ADMIN — MORPHINE SULFATE 4 MG: 2 INJECTION, SOLUTION INTRAMUSCULAR; INTRAVENOUS at 01:11

## 2024-01-01 RX ADMIN — FLUOXETINE HYDROCHLORIDE 20 MG: 20 SOLUTION ORAL at 09:10

## 2024-01-01 RX ADMIN — DIAZEPAM 5 MG: 5 INJECTION, SOLUTION INTRAMUSCULAR; INTRAVENOUS at 05:11

## 2024-01-01 RX ADMIN — Medication 2 MG/HR: at 01:11

## 2024-01-01 RX ADMIN — FAMOTIDINE 20 MG: 20 TABLET ORAL at 09:10

## 2024-01-01 RX ADMIN — DIAZEPAM 5 MG: 5 INJECTION, SOLUTION INTRAMUSCULAR; INTRAVENOUS at 09:11

## 2024-01-01 RX ADMIN — SENNOSIDES AND DOCUSATE SODIUM 1 TABLET: 50; 8.6 TABLET ORAL at 08:11

## 2024-01-01 RX ADMIN — DIAZEPAM 5 MG: 5 INJECTION, SOLUTION INTRAMUSCULAR; INTRAVENOUS at 01:11

## 2024-01-01 RX ADMIN — TIMOLOL MALEATE 1 DROP: 5 SOLUTION OPHTHALMIC at 08:11

## 2024-01-01 RX ADMIN — POTASSIUM & SODIUM PHOSPHATES POWDER PACK 280-160-250 MG 2 PACKET: 280-160-250 PACK at 01:11

## 2024-01-01 RX ADMIN — FLUOXETINE HYDROCHLORIDE 20 MG: 20 SOLUTION ORAL at 08:11

## 2024-01-01 RX ADMIN — HYPROMELLOSE 2910 1 DROP: 5 SOLUTION/ DROPS OPHTHALMIC at 10:11

## 2024-01-01 RX ADMIN — VASOPRESSIN: 20 INJECTION, SOLUTION INTRAVENOUS at 04:11

## 2024-01-01 RX ADMIN — Medication 100 MCG/HR: at 08:11

## 2024-01-01 RX ADMIN — AMLODIPINE BESYLATE 10 MG: 10 TABLET ORAL at 08:11

## 2024-01-01 RX ADMIN — HYPROMELLOSE 2910 1 DROP: 5 SOLUTION/ DROPS OPHTHALMIC at 05:11

## 2024-01-01 RX ADMIN — HYDRALAZINE HYDROCHLORIDE 10 MG: 20 INJECTION INTRAMUSCULAR; INTRAVENOUS at 01:10

## 2024-01-01 RX ADMIN — ACETAMINOPHEN 650 MG: 325 TABLET, FILM COATED ORAL at 08:10

## 2024-01-01 RX ADMIN — MEROPENEM 1 G: 1 INJECTION INTRAVENOUS at 04:11

## 2024-01-01 RX ADMIN — POTASSIUM & SODIUM PHOSPHATES POWDER PACK 280-160-250 MG 2 PACKET: 280-160-250 PACK at 02:10

## 2024-01-01 RX ADMIN — MINERAL OIL AND WHITE PETROLATUM: 30; 940 OINTMENT OPHTHALMIC at 08:11

## 2024-01-01 RX ADMIN — VASOPRESSIN 250 ML: 20 INJECTION, SOLUTION INTRAVENOUS at 07:10

## 2024-01-01 RX ADMIN — DIAZEPAM 5 MG: 5 INJECTION, SOLUTION INTRAMUSCULAR; INTRAVENOUS at 02:11

## 2024-01-01 RX ADMIN — SCOPALAMINE 1 PATCH: 1 PATCH, EXTENDED RELEASE TRANSDERMAL at 02:11

## 2024-01-01 RX ADMIN — FINASTERIDE 1 MG: 1 TABLET, FILM COATED ORAL at 12:10

## 2024-01-01 RX ADMIN — HEPARIN SODIUM 5000 UNITS: 5000 INJECTION INTRAVENOUS; SUBCUTANEOUS at 05:10

## 2024-01-01 RX ADMIN — POTASSIUM & SODIUM PHOSPHATES POWDER PACK 280-160-250 MG 2 PACKET: 280-160-250 PACK at 04:11

## 2024-01-01 RX ADMIN — SCOPALAMINE 1 PATCH: 1 PATCH, EXTENDED RELEASE TRANSDERMAL at 03:11

## 2024-01-01 RX ADMIN — Medication 3 MG/HR: at 05:11

## 2024-01-01 RX ADMIN — FENTANYL CITRATE 50 MCG: 50 INJECTION INTRAMUSCULAR; INTRAVENOUS at 05:10

## 2024-01-01 RX ADMIN — FAMOTIDINE 20 MG: 20 TABLET ORAL at 08:10

## 2024-01-01 RX ADMIN — HYPROMELLOSE 2910 1 DROP: 5 SOLUTION OPHTHALMIC at 10:11

## 2024-01-01 RX ADMIN — VASOPRESSIN: 20 INJECTION, SOLUTION INTRAVENOUS at 02:11

## 2024-01-01 RX ADMIN — HEPARIN SODIUM 5000 UNITS: 5000 INJECTION INTRAVENOUS; SUBCUTANEOUS at 02:10

## 2024-01-01 RX ADMIN — SENNOSIDES AND DOCUSATE SODIUM 1 TABLET: 50; 8.6 TABLET ORAL at 08:10

## 2024-01-01 RX ADMIN — VASOPRESSIN: 20 INJECTION, SOLUTION INTRAVENOUS at 09:11

## 2024-01-01 RX ADMIN — HEPARIN SODIUM 5000 UNITS: 5000 INJECTION INTRAVENOUS; SUBCUTANEOUS at 10:10

## 2024-01-01 RX ADMIN — MINERAL OIL AND WHITE PETROLATUM: 30; 940 OINTMENT OPHTHALMIC at 06:10

## 2024-01-01 RX ADMIN — POLYETHYLENE GLYCOL 3350 17 G: 17 POWDER, FOR SOLUTION ORAL at 09:10

## 2024-01-01 RX ADMIN — LORAZEPAM 1 MG: 2 INJECTION INTRAMUSCULAR; INTRAVENOUS at 01:11

## 2024-01-01 RX ADMIN — PRAVASTATIN SODIUM 40 MG: 20 TABLET ORAL at 09:10

## 2024-01-01 RX ADMIN — ENOXAPARIN SODIUM 40 MG: 40 INJECTION SUBCUTANEOUS at 04:11

## 2024-01-01 RX ADMIN — POTASSIUM & SODIUM PHOSPHATES POWDER PACK 280-160-250 MG 2 PACKET: 280-160-250 PACK at 04:10

## 2024-01-01 RX ADMIN — POTASSIUM & SODIUM PHOSPHATES POWDER PACK 280-160-250 MG 2 PACKET: 280-160-250 PACK at 11:11

## 2024-01-01 RX ADMIN — POTASSIUM BICARBONATE 50 MEQ: 978 TABLET, EFFERVESCENT ORAL at 05:11

## 2024-01-01 RX ADMIN — FINASTERIDE 1 MG: 1 TABLET, FILM COATED ORAL at 08:10

## 2024-01-01 RX ADMIN — HYPROMELLOSE 2910 1 DROP: 5 SOLUTION/ DROPS OPHTHALMIC at 04:11

## 2024-01-01 RX ADMIN — PROPOFOL 50 MCG/KG/MIN: 10 INJECTION, EMULSION INTRAVENOUS at 05:10

## 2024-01-01 RX ADMIN — MINERAL OIL AND WHITE PETROLATUM: 30; 940 OINTMENT OPHTHALMIC at 12:11

## 2024-01-01 RX ADMIN — Medication 800 MG: at 02:10

## 2024-01-01 RX ADMIN — VASOPRESSIN: 20 INJECTION, SOLUTION INTRAVENOUS at 12:11

## 2024-01-01 RX ADMIN — AMLODIPINE BESYLATE 10 MG: 10 TABLET ORAL at 09:10

## 2024-01-01 RX ADMIN — HYDRALAZINE HYDROCHLORIDE 10 MG: 20 INJECTION INTRAMUSCULAR; INTRAVENOUS at 06:10

## 2024-01-01 RX ADMIN — VASOPRESSIN: 20 INJECTION, SOLUTION INTRAVENOUS at 06:11

## 2024-01-01 RX ADMIN — MINERAL OIL AND WHITE PETROLATUM: 30; 940 OINTMENT OPHTHALMIC at 04:11

## 2024-01-01 RX ADMIN — FENTANYL CITRATE 50 MCG: 50 INJECTION INTRAMUSCULAR; INTRAVENOUS at 04:10

## 2024-01-01 RX ADMIN — Medication 10 ML: at 10:11

## 2024-01-01 RX ADMIN — FENTANYL CITRATE 50 MCG: 50 INJECTION INTRAMUSCULAR; INTRAVENOUS at 02:11

## 2024-01-01 RX ADMIN — ACETAMINOPHEN 650 MG: 325 TABLET, FILM COATED ORAL at 10:11

## 2024-01-01 RX ADMIN — SENNOSIDES AND DOCUSATE SODIUM 1 TABLET: 50; 8.6 TABLET ORAL at 09:10

## 2024-01-01 RX ADMIN — HYPROMELLOSE 2910 1 DROP: 5 SOLUTION OPHTHALMIC at 06:11

## 2024-01-01 RX ADMIN — SILODOSIN 4 MG: 4 CAPSULE ORAL at 12:10

## 2024-01-01 RX ADMIN — FENTANYL CITRATE 100 MCG: 50 INJECTION INTRAMUSCULAR; INTRAVENOUS at 09:10

## 2024-01-01 RX ADMIN — VASOPRESSIN 250 ML: 20 INJECTION, SOLUTION INTRAVENOUS at 01:10

## 2024-01-01 RX ADMIN — PRAVASTATIN SODIUM 40 MG: 20 TABLET ORAL at 08:10

## 2024-01-01 RX ADMIN — FLUOXETINE HYDROCHLORIDE 20 MG: 20 SOLUTION ORAL at 08:10

## 2024-01-01 RX ADMIN — PROPOFOL 50 MCG/KG/MIN: 10 INJECTION, EMULSION INTRAVENOUS at 10:10

## 2024-01-01 RX ADMIN — HYPROMELLOSE 2910 1 DROP: 5 SOLUTION/ DROPS OPHTHALMIC at 03:11

## 2024-01-01 RX ADMIN — Medication 75 MCG/HR: at 12:11

## 2024-01-01 RX ADMIN — VASOPRESSIN 250 ML: 20 INJECTION, SOLUTION INTRAVENOUS at 06:11

## 2024-01-01 RX ADMIN — MORPHINE SULFATE 4 MG: 2 INJECTION, SOLUTION INTRAMUSCULAR; INTRAVENOUS at 04:11

## 2024-01-01 RX ADMIN — Medication 2 MG/HR: at 09:11

## 2024-01-01 RX ADMIN — MEROPENEM 1 G: 1 INJECTION INTRAVENOUS at 04:10

## 2024-01-01 RX ADMIN — POTASSIUM BICARBONATE 35 MEQ: 391 TABLET, EFFERVESCENT ORAL at 02:10

## 2024-01-01 RX ADMIN — TIMOLOL MALEATE 1 DROP: 5 SOLUTION OPHTHALMIC at 08:10

## 2024-01-01 RX ADMIN — AMLODIPINE BESYLATE 10 MG: 10 TABLET ORAL at 08:10

## 2024-01-01 RX ADMIN — POTASSIUM & SODIUM PHOSPHATES POWDER PACK 280-160-250 MG 2 PACKET: 280-160-250 PACK at 06:11

## 2024-01-01 RX ADMIN — Medication 2 MG/HR: at 08:11

## 2024-01-01 RX ADMIN — Medication 2 MG/HR: at 03:11

## 2024-01-01 RX ADMIN — PROPOFOL 50 MCG/KG/MIN: 10 INJECTION, EMULSION INTRAVENOUS at 03:10

## 2024-01-01 RX ADMIN — CEFTRIAXONE 1 G: 1 INJECTION, POWDER, FOR SOLUTION INTRAMUSCULAR; INTRAVENOUS at 05:10

## 2024-01-01 RX ADMIN — VASOPRESSIN 250 ML: 20 INJECTION, SOLUTION INTRAVENOUS at 01:11

## 2024-01-01 RX ADMIN — ENOXAPARIN SODIUM 40 MG: 40 INJECTION SUBCUTANEOUS at 04:10

## 2024-01-01 RX ADMIN — MINERAL OIL AND WHITE PETROLATUM: 30; 940 OINTMENT OPHTHALMIC at 09:11

## 2024-01-01 RX ADMIN — DIAZEPAM 5 MG: 5 INJECTION, SOLUTION INTRAMUSCULAR; INTRAVENOUS at 11:11

## 2024-01-01 RX ADMIN — MINERAL OIL AND WHITE PETROLATUM: 30; 940 OINTMENT OPHTHALMIC at 09:10

## 2024-01-01 RX ADMIN — Medication 800 MG: at 06:10

## 2024-01-01 RX ADMIN — Medication 25 MCG/HR: at 05:10

## 2024-01-01 RX ADMIN — FENTANYL CITRATE 50 MCG: 50 INJECTION INTRAMUSCULAR; INTRAVENOUS at 03:11

## 2024-01-01 RX ADMIN — ACETAMINOPHEN 650 MG: 325 TABLET, FILM COATED ORAL at 02:10

## 2024-01-01 RX ADMIN — SCOPALAMINE 1 PATCH: 1 PATCH, EXTENDED RELEASE TRANSDERMAL at 01:11

## 2024-01-01 RX ADMIN — HYDRALAZINE HYDROCHLORIDE 10 MG: 20 INJECTION INTRAMUSCULAR; INTRAVENOUS at 07:10

## 2024-01-01 RX ADMIN — LORAZEPAM 1 MG: 2 INJECTION INTRAMUSCULAR; INTRAVENOUS at 11:11

## 2024-01-01 RX ADMIN — MINERAL OIL AND WHITE PETROLATUM: 30; 940 OINTMENT OPHTHALMIC at 05:11

## 2024-01-01 RX ADMIN — POTASSIUM & SODIUM PHOSPHATES POWDER PACK 280-160-250 MG 2 PACKET: 280-160-250 PACK at 05:11

## 2024-01-01 RX ADMIN — LABETALOL HYDROCHLORIDE 10 MG: 5 INJECTION, SOLUTION INTRAVENOUS at 04:10

## 2024-01-01 RX ADMIN — POTASSIUM BICARBONATE 35 MEQ: 391 TABLET, EFFERVESCENT ORAL at 04:10

## 2024-01-01 RX ADMIN — HYPROMELLOSE 2910 1 DROP: 5 SOLUTION OPHTHALMIC at 01:11

## 2024-01-01 RX ADMIN — PENICILLIN G BENZATHINE 2.4 MILLION UNITS: 2400000 INJECTION, SUSPENSION INTRAMUSCULAR at 12:10

## 2024-01-01 RX ADMIN — DORZOLAMIDE HYDROCHLORIDE 1 DROP: 20 SOLUTION OPHTHALMIC at 08:10

## 2024-01-01 RX ADMIN — SODIUM CHLORIDE: 9 INJECTION, SOLUTION INTRAVENOUS at 12:10

## 2024-01-01 RX ADMIN — HYPROMELLOSE 2910 1 DROP: 5 SOLUTION/ DROPS OPHTHALMIC at 01:11

## 2024-01-02 ENCOUNTER — PATIENT OUTREACH (OUTPATIENT)
Dept: ADMINISTRATIVE | Facility: HOSPITAL | Age: 74
End: 2024-01-02
Payer: MEDICARE

## 2024-01-08 ENCOUNTER — OFFICE VISIT (OUTPATIENT)
Dept: UROLOGY | Facility: CLINIC | Age: 74
End: 2024-01-08
Attending: SPECIALIST
Payer: MEDICARE

## 2024-01-08 VITALS
BODY MASS INDEX: 20.02 KG/M2 | HEIGHT: 74 IN | HEART RATE: 66 BPM | OXYGEN SATURATION: 98 % | WEIGHT: 156 LBS | DIASTOLIC BLOOD PRESSURE: 62 MMHG | SYSTOLIC BLOOD PRESSURE: 98 MMHG

## 2024-01-08 DIAGNOSIS — R33.9 URINARY RETENTION: Primary | ICD-10-CM

## 2024-01-08 PROCEDURE — 99999 PR PBB SHADOW E&M-EST. PATIENT-LVL III: CPT | Mod: PBBFAC,,, | Performed by: SPECIALIST

## 2024-01-08 PROCEDURE — 99213 OFFICE O/P EST LOW 20 MIN: CPT | Mod: S$GLB,,, | Performed by: SPECIALIST

## 2024-01-08 NOTE — PROGRESS NOTES
"[facility] - Urology   Clinic Note    SUBJECTIVE:     Chief Complaint   Patient presents with    Follow-up     Pt's sister states the catheter Dr. Oquendo put in was too big and states he pulled it out because he was in pain. States they brought him to [facility] and they said he had an infection due to the catheter. They changed the catheter and flushed him out.        Referral from: No ref. provider found.    History of Present Illness:  Irvin Nuñez Jr. is a 73 y.o. male who presents to clinic for retention.    ED note reviewed.  Feeling much better after the balanitis was treated (with Cipro) and a "smaller" patino catheter placed.  No fever/chills.  Unclear what their status is regarding .  Note from 11/30/23 reviewed.  Past Medical History:   Diagnosis Date    High cholesterol     Hypertension     Incontinence of urine     Schizophrenia     Vascular dementia        Current Outpatient Medications on File Prior to Visit   Medication Sig Dispense Refill    amLODIPine (NORVASC) 2.5 MG tablet Take 1 tablet (2.5 mg total) by mouth once daily. 90 tablet 3    clotrimazole-betamethasone 1-0.05% (LOTRISONE) cream Apply topically 2 (two) times daily. 15 g 0    diclofenac sodium (VOLTAREN) 1 % Gel APPLY 2g topically once DAILY 200 g 2    donepeziL (ARICEPT) 10 MG tablet TAKE ONE TABLET BY MOUTH EVERY EVENING 30 tablet 11    finasteride (PROSCAR) 5 mg tablet Take 1 tablet (5 mg total) by mouth once daily. 90 tablet 3    FLUoxetine 20 MG capsule TAKE ONE CAPSULE BY MOUTH EVERY MORNING 90 capsule 3    folic acid (FOLVITE) 1 MG tablet TAKE ONE TABLET BY MOUTH EVERY NIGHT 90 tablet 3    pravastatin (PRAVACHOL) 40 MG tablet Take 1 tablet (40 mg total) by mouth every evening. 30 tablet 11    traMADoL (ULTRAM) 50 mg tablet TAKE TWO TABLETS BY MOUTH THREE TIMES DAILY AS NEEDED FOR PAIN 180 tablet 0     No current facility-administered medications on file prior to visit.         OBJECTIVE:     Estimated " "body mass index is 20.03 kg/m² as calculated from the following:    Height as of this encounter: 6' 2" (1.88 m).    Weight as of this encounter: 70.8 kg (156 lb).    Vital Signs (Most Recent)  Vitals:    01/08/24 0840   BP: 98/62   Pulse: 66       Physical Exam:    Physical Exam     GENERAL: patient sitting comfortably  PSYCH: appropriate mood and affect    LABS:     Lab Results   Component Value Date    BUN 8 09/24/2023    CREATININE 0.7 09/24/2023    WBC 6.52 09/24/2023    HGB 10.9 (L) 09/24/2023    HCT 34.4 (L) 09/24/2023     09/24/2023    AST 25 09/22/2023    ALT 21 09/22/2023    ALKPHOS 64 09/22/2023    ALBUMIN 2.9 (L) 09/22/2023    HGBA1C 5.4 02/14/2022    INR 1.0 09/22/2023        Urinalysis:   No results found for: "UAREFLEX"     PSA:  Lab Results   Component Value Date    PSA 1.7 02/14/2022    PSA 2.0 06/23/2020    PSA 1.4 03/26/2019    PSA 1.7 10/14/2015       ASSESSMENT     1. Urinary retention        PLAN:     RTC six weeks for patino catheter exchanged.    Reymundo Oquendo MD  Urology  [facility]     Disclaimer: This note has been generated using voice-recognition software. There may be typographical errors that have been missed during proof-reading.     "

## 2024-01-17 PROBLEM — K62.5 RECTAL BLEEDING: Status: ACTIVE | Noted: 2024-01-17

## 2024-01-17 PROBLEM — A41.9 SEPSIS: Status: ACTIVE | Noted: 2024-01-17

## 2024-01-17 PROBLEM — R33.9 URINARY RETENTION: Status: ACTIVE | Noted: 2024-01-17

## 2024-01-18 PROBLEM — L89.320 PRESSURE INJURY OF LEFT BUTTOCK, UNSTAGEABLE: Status: ACTIVE | Noted: 2024-01-18

## 2024-01-19 PROBLEM — J18.9 CAP (COMMUNITY ACQUIRED PNEUMONIA): Status: ACTIVE | Noted: 2024-01-17

## 2024-01-20 PROBLEM — D64.9 ANEMIA: Status: ACTIVE | Noted: 2024-01-20

## 2024-01-22 PROBLEM — L89.322 PRESSURE INJURY OF LEFT BUTTOCK, STAGE 2: Chronic | Status: ACTIVE | Noted: 2023-09-25

## 2024-01-25 ENCOUNTER — PATIENT OUTREACH (OUTPATIENT)
Dept: ADMINISTRATIVE | Facility: CLINIC | Age: 74
End: 2024-01-25
Payer: MEDICARE

## 2024-01-25 NOTE — PROGRESS NOTES
C3 nurse spoke with Irvin Nuñez Jr. Sister Maryse for a TCC post hospital discharge follow up call. The patient has a scheduled HOSFU appointment with Alberto Lundberg Jr., MD  on 2/1/24 @ 1300.

## 2024-02-20 ENCOUNTER — OFFICE VISIT (OUTPATIENT)
Dept: UROLOGY | Facility: CLINIC | Age: 74
End: 2024-02-20
Attending: SPECIALIST
Payer: MEDICARE

## 2024-02-20 VITALS
OXYGEN SATURATION: 99 % | BODY MASS INDEX: 18.87 KG/M2 | HEART RATE: 66 BPM | DIASTOLIC BLOOD PRESSURE: 74 MMHG | HEIGHT: 74 IN | SYSTOLIC BLOOD PRESSURE: 126 MMHG | WEIGHT: 147 LBS

## 2024-02-20 DIAGNOSIS — Z97.8 CHRONIC INDWELLING FOLEY CATHETER: Primary | ICD-10-CM

## 2024-02-20 PROCEDURE — 99999 PR PBB SHADOW E&M-EST. PATIENT-LVL III: CPT | Mod: PBBFAC,,, | Performed by: SPECIALIST

## 2024-02-20 PROCEDURE — 99212 OFFICE O/P EST SF 10 MIN: CPT | Mod: S$GLB,,, | Performed by: SPECIALIST

## 2024-02-21 NOTE — PROGRESS NOTES
"[facility] - Urology   Clinic Note    SUBJECTIVE:     Chief Complaint   Patient presents with    Follow-up       Referral from: No ref. provider found.    History of Present Illness:  Irvin Nuñez Jr. is a 73 y.o. male who presents to clinic for patino cath change.  However, he mentions is was just changed prior to returning to clinic.  No significant changes since last visit      Past Medical History:   Diagnosis Date    High cholesterol     Hypertension     Incontinence of urine     Schizophrenia     Vascular dementia        Current Outpatient Medications on File Prior to Visit   Medication Sig Dispense Refill    amLODIPine (NORVASC) 10 MG tablet Take 1 tablet (10 mg total) by mouth once daily. 90 tablet 3    bacitracin-neomycin-polymyxin b-hydrocortisone 1 % ointment Apply topically 2 (two) times daily. 15 g 1    clotrimazole-betamethasone 1-0.05% (LOTRISONE) cream Apply topically 2 (two) times daily. 15 g 0    diclofenac sodium (VOLTAREN) 1 % Gel APPLY 2g topically once DAILY 200 g 2    donepeziL (ARICEPT) 10 MG tablet TAKE ONE TABLET BY MOUTH EVERY EVENING 30 tablet 11    ferrous gluconate 324 mg (37.5 mg iron) Tab tablet Take 1 tablet (324 mg total) by mouth every other day. 15 tablet 11    finasteride (PROSCAR) 5 mg tablet Take 1 tablet (5 mg total) by mouth once daily. 90 tablet 3    FLUoxetine 20 MG capsule TAKE ONE CAPSULE BY MOUTH EVERY MORNING 90 capsule 3    folic acid (FOLVITE) 1 MG tablet TAKE ONE TABLET BY MOUTH EVERY NIGHT 90 tablet 3    pravastatin (PRAVACHOL) 40 MG tablet Take 1 tablet (40 mg total) by mouth every evening. 30 tablet 11    traMADoL (ULTRAM) 50 mg tablet TAKE TWO TABLETS BY MOUTH THREE TIMES DAILY AS NEEDED FOR PAIN 180 tablet 0     No current facility-administered medications on file prior to visit.         OBJECTIVE:     Estimated body mass index is 18.87 kg/m² as calculated from the following:    Height as of this encounter: 6' 2" (1.88 m).    Weight as of this " "encounter: 66.7 kg (147 lb).    Vital Signs (Most Recent)  Vitals:    02/20/24 0844   BP: 126/74   Pulse: 66       Physical Exam:    Physical Exam     GENERAL: patient sitting comfortably  Genitourinary Exam:  Patino to SD, UOP clear      LABS:     Lab Results   Component Value Date    BUN 15 02/07/2024    CREATININE 1.1 02/07/2024    WBC 5.72 02/07/2024    HGB 10.3 (L) 02/07/2024    HCT 33.8 (L) 02/07/2024     02/07/2024    AST 9 (L) 02/07/2024    ALT 7 (L) 02/07/2024    ALKPHOS 66 02/07/2024    ALBUMIN 3.2 (L) 02/07/2024    HGBA1C 5.4 02/14/2022    INR 1.0 09/22/2023        Urinalysis:   No results found for: "UAREFLEX"     PSA:  Lab Results   Component Value Date    PSA 1.7 02/14/2022    PSA 2.0 06/23/2020    PSA 1.4 03/26/2019    PSA 1.7 10/14/2015       Testosterone:  No results found for: "TOTALTESTOST", "TESTOSTERONE"     ASSESSMENT     1. Chronic indwelling Patino catheter        PLAN:     Home health to change patino q six to eight weeks    Reymundo Oquendo MD  Urology  [facility]     Disclaimer: This note has been generated using voice-recognition software. There may be typographical errors that have been missed during proof-reading.     "

## 2024-03-15 ENCOUNTER — EXTERNAL HOME HEALTH (OUTPATIENT)
Dept: HOME HEALTH SERVICES | Facility: HOSPITAL | Age: 74
End: 2024-03-15
Payer: MEDICARE

## 2024-04-01 ENCOUNTER — DOCUMENT SCAN (OUTPATIENT)
Dept: HOME HEALTH SERVICES | Facility: HOSPITAL | Age: 74
End: 2024-04-01
Payer: MEDICARE

## 2024-04-16 ENCOUNTER — DOCUMENT SCAN (OUTPATIENT)
Dept: HOME HEALTH SERVICES | Facility: HOSPITAL | Age: 74
End: 2024-04-16
Payer: MEDICARE

## 2024-04-16 ENCOUNTER — OFFICE VISIT (OUTPATIENT)
Dept: UROLOGY | Facility: CLINIC | Age: 74
End: 2024-04-16
Attending: SPECIALIST
Payer: MEDICARE

## 2024-04-16 DIAGNOSIS — Z97.8 CHRONIC INDWELLING FOLEY CATHETER: Primary | ICD-10-CM

## 2024-04-16 PROCEDURE — 99999 PR PBB SHADOW E&M-EST. PATIENT-LVL III: CPT | Mod: PBBFAC,,, | Performed by: SPECIALIST

## 2024-04-16 PROCEDURE — 3288F FALL RISK ASSESSMENT DOCD: CPT | Mod: CPTII,S$GLB,, | Performed by: SPECIALIST

## 2024-04-16 PROCEDURE — 1160F RVW MEDS BY RX/DR IN RCRD: CPT | Mod: CPTII,S$GLB,, | Performed by: SPECIALIST

## 2024-04-16 PROCEDURE — 1101F PT FALLS ASSESS-DOCD LE1/YR: CPT | Mod: CPTII,S$GLB,, | Performed by: SPECIALIST

## 2024-04-16 PROCEDURE — 99213 OFFICE O/P EST LOW 20 MIN: CPT | Mod: S$GLB,,, | Performed by: SPECIALIST

## 2024-04-16 PROCEDURE — 1159F MED LIST DOCD IN RCRD: CPT | Mod: CPTII,S$GLB,, | Performed by: SPECIALIST

## 2024-04-16 NOTE — PROGRESS NOTES
[facility] - Urology   Clinic Note    SUBJECTIVE:     Chief Complaint   Patient presents with    Follow-up     Cath follow up       Referral from: No ref. provider found.    History of Present Illness:  Irvin Nuñez Jr. is a 73 y.o. male who presents to clinic for retention.    Appears home health changing patino caths (q 4-6 weeks).    Past Medical History:   Diagnosis Date    High cholesterol     Hypertension     Incontinence of urine     Schizophrenia     Vascular dementia        Current Outpatient Medications on File Prior to Visit   Medication Sig Dispense Refill    amLODIPine (NORVASC) 10 MG tablet Take 1 tablet (10 mg total) by mouth once daily. 90 tablet 3    bacitracin-neomycin-polymyxin b-hydrocortisone 1 % ointment Apply topically 2 (two) times daily. 15 g 1    clotrimazole-betamethasone 1-0.05% (LOTRISONE) cream Apply topically 2 (two) times daily. 15 g 0    diclofenac sodium (VOLTAREN) 1 % Gel APPLY 2g topically once DAILY 200 g 2    donepeziL (ARICEPT) 10 MG tablet TAKE ONE TABLET BY MOUTH EVERY EVENING 30 tablet 11    ferrous gluconate 324 mg (37.5 mg iron) Tab tablet Take 1 tablet (324 mg total) by mouth every other day. 15 tablet 11    finasteride (PROSCAR) 5 mg tablet Take 1 tablet (5 mg total) by mouth once daily. 90 tablet 3    FLUoxetine 20 MG capsule TAKE ONE CAPSULE BY MOUTH EVERY MORNING 90 capsule 3    folic acid (FOLVITE) 1 MG tablet TAKE ONE TABLET BY MOUTH EVERY NIGHT 90 tablet 3    gentamicin (GARAMYCIN) 0.3 % (3 mg/gram) ophthalmic ointment Place 0.5 inches into the left eye 3 (three) times daily. 3.5 g 1    pravastatin (PRAVACHOL) 40 MG tablet Take 1 tablet (40 mg total) by mouth every evening. 30 tablet 11    traMADoL (ULTRAM) 50 mg tablet TAKE TWO TABLETS BY MOUTH THREE TIMES DAILY AS NEEDED FOR PAIN 180 tablet 0     No current facility-administered medications on file prior to visit.         OBJECTIVE:     Estimated body mass index is 17.58 kg/m² (pended) as calculated  "from the following:    Height as of this encounter: (P) 6' 2" (1.88 m).    Weight as of this encounter: (P) 62.1 kg (136 lb 14.5 oz).    Vital Signs (Most Recent)  Vitals:    04/16/24 1034   BP: (P) 122/68   Pulse: (P) 60       Physical Exam:    Physical Exam     GENERAL: patient sitting comfortably  HEENT: normocephalic  NECK: supple, no JVD  PULM: normal chest rise, no increased WOB  HEART: non-diaphoretic  ABDO: soft, nondistended, nontender  BACK: no CVA tenderness bilaterally  SKIN: warm, dry, well perfused  EXT: no bruising or edema  NEURO: grossly normal with no focal deficits  PSYCH: appropriate mood and affect    Genitourinary Exam:  [place] to SD, UOP clear      LABS:     Lab Results   Component Value Date    BUN 11 02/29/2024    CREATININE 0.9 02/29/2024    WBC 8.11 02/29/2024    HGB 9.4 (L) 02/29/2024    HCT 30.1 (L) 02/29/2024     02/29/2024    AST 9 (L) 02/07/2024    ALT 7 (L) 02/07/2024    ALKPHOS 66 02/07/2024    ALBUMIN 3.2 (L) 02/07/2024    HGBA1C 5.4 02/14/2022    INR 1.0 09/22/2023        Urinalysis:   No results found for: "UAREFLEX"     PSA:  Lab Results   Component Value Date    PSA 1.7 02/14/2022    PSA 2.0 06/23/2020    PSA 1.4 03/26/2019    PSA 1.7 10/14/2015       Testosterone:  No results found for: "TOTALTESTOST", "TESTOSTERONE"     Imaging:  I have personally reviewed all relevant imaging studies.    Results for orders placed or performed during the hospital encounter of 01/17/24 (from the past 2160 hour(s))   CT Chest Abdomen Pelvis With IV Contrast (XPD) NO Oral Contrast    Narrative    EXAMINATION:  CT CHEST ABDOMEN PELVIS WITH IV CONTRAST (XPD)    CLINICAL HISTORY:  Sepsis;    TECHNIQUE:  Axial images were obtained of the chest, abdomen and pelvis from the thoracic inlet through the symphysis pubis after the administration of intravenous contrast.    COMPARISON:  This examination was correlated with a chest x-ray from September 22, 2023.    FINDINGS:  Mediastinum, hilar and " axillary regions:    There are calcifications in the aortic arch.  An enlarged lymph node is noted in the subcarinal region measuring 3.8 cm in greatest dimension.    There are mildly prominent lymph nodes in the pulmonary hilar regions bilaterally.  There are no pleural effusions.  A small amount of fluid is noted in the pericardium.    Lungs:    There is extensive airspace consolidation throughout the left lower lobe and in the posterior aspect of the left upper lobe.    There are patchy areas of ground-glass consolidation in the right lower lobe medially in the right middle lobe posteriorly.    Abdomen and pelvis:    The liver, spleen and pancreas enhance normally.  The gallbladder and adrenal glands are unremarkable.  The left kidney enhances normally.    There is a cyst projecting off the anterior aspect of the midportion of the right kidney measuring 3.5 cm in greatest dimension.  Calcifications are noted in the abdominal aorta and its branch vessels.    A [place] catheter is noted in place with the balloon identified in the region of the prostatic urethra.  There is minimal fluid in the urinary bladder.  Fluid is noted in the right inguinal canal.    There is a large amount of fecal material in the rectum with scattered fecal material in the rest of the colon.  There is a fatty density umbilical hernia.    There is no lymphadenopathy in the abdomen or pelvis.    Delayed images:    There is contrast excretion into both renal pelvocaliceal systems and contrast accumulation in the urinary bladder.    Osseous structures:    There are spondylotic changes in the lower cervical, thoracic and lumbar spine.  There are chronic fracture deformities of the left ribcage.      Impression    Extensive airspace consolidation in the left lower lobe in the posterior aspect of the left upper lobe with patchy areas of ground-glass consolidation in the right lower lobe and right middle lobe.  These findings may represent a  multifocal pneumonia.    Right renal cyst.    Enlarged lymph node in the subcarinal region and mildly prominent lymph nodes in the pulmonary hilar regions.  A PET-CT scan could be obtained for further assessment to evaluate the significance of these findings.    Patino catheter in place with the balloon in the region of the prostatic urethra.    This report was called to the emergency department physician at 17:36 on January 17, 2024.      Electronically signed by: Ney Medina MD  Date:    01/17/2024  Time:    17:37     No results found for this or any previous visit (from the past 2160 hour(s)).  X-Ray Chest AP Portable  Narrative: EXAMINATION:  XR CHEST AP PORTABLE    CLINICAL HISTORY:  CHF;    COMPARISON:  Single-view chest 01/17/2024.    FINDINGS:  Heart size is normal.  Mild calcification aortic arch.    Near complete clearing previously demonstrated airspace disease left mid and lower lung zone with residual coarsening of pulmonary parenchymal markings and with atelectasis and or patchy airspace opacities predominantly left base.  Right lung remains relatively clear.  Nodular opacity projecting over the right lower lung zone laterally projecting between the anterior right 5th and 6th ribs noted and may reflect overlapping nipple shadow.    Old left rib fracture deformities noted..    Visualized osseous structures appear intact.  Impression: Near complete clearing previously demonstrated airspace disease left mid and lower lung zones with residual coarsening of pulmonary parenchymal markings and atelectasis and or patchy airspace opacities left base.    Nodular opacity right lower lung zone peripherally likely overlapping nipple shadow as no corresponding pulmonary parenchymal nodule demonstrated on the CT chest 01/17/2024.    Electronically signed by: Patrizia Bernabe MD  Date:    02/08/2024  Time:    05:40         ASSESSMENT     1. Chronic indwelling Patino catheter        PLAN:     Continue patino cath  exchanges q 4-6 weeks by home health.  RTC 3 mo.    Reymundo Oquendo MD  Urology  [facility]     Disclaimer: This note has been generated using voice-recognition software. There may be typographical errors that have been missed during proof-reading.

## 2024-04-22 PROBLEM — K62.5 RECTAL BLEEDING: Status: RESOLVED | Noted: 2024-01-17 | Resolved: 2024-04-22

## 2024-04-27 ENCOUNTER — EXTERNAL HOME HEALTH (OUTPATIENT)
Dept: HOME HEALTH SERVICES | Facility: HOSPITAL | Age: 74
End: 2024-04-27
Payer: MEDICARE

## 2024-04-29 PROBLEM — J18.9 CAP (COMMUNITY ACQUIRED PNEUMONIA): Status: RESOLVED | Noted: 2024-01-17 | Resolved: 2024-04-29

## 2024-05-17 ENCOUNTER — TELEPHONE (OUTPATIENT)
Dept: UROLOGY | Facility: CLINIC | Age: 74
End: 2024-05-17
Payer: MEDICARE

## 2024-05-17 NOTE — TELEPHONE ENCOUNTER
Tried returning call to [facility] with HH to let her know Dr. Oquendo said it's okay for them to place another catheter on the patient,  answered and transferred me over but no one answered and the phone hung up.

## 2024-05-17 NOTE — TELEPHONE ENCOUNTER
----- Message from Leni Mares sent at 2024 10:53 AM CDT -----  Contact: MEETA - [facility]  Irvin Nuñez Jr.  MRN: [id number]  : 1950  PCP: Alberto Lundberg Jr.  Home Phone      [phone]  Work Phone      Not on file.  Mobile          [phone]      MESSAGE: Meeta states that they received a call from the patient stating he pulled out his catheter and is needing a new one placed.  [facility] is needing an order for this to be done. She says that she can take a verbal.        Phone: [phone]

## 2024-05-23 ENCOUNTER — PATIENT OUTREACH (OUTPATIENT)
Dept: ADMINISTRATIVE | Facility: HOSPITAL | Age: 74
End: 2024-05-23
Payer: MEDICARE

## 2024-05-23 NOTE — PROGRESS NOTES
Spoke with pt's sister in reference to follow up on cologuard kit . Pt sister states it very hard to complete kit  because pt has alzheimer and wears diapers and colonoscopy is not recommend either .

## 2024-06-10 ENCOUNTER — DOCUMENT SCAN (OUTPATIENT)
Dept: HOME HEALTH SERVICES | Facility: HOSPITAL | Age: 74
End: 2024-06-10
Payer: MEDICARE

## 2024-06-21 ENCOUNTER — EXTERNAL HOME HEALTH (OUTPATIENT)
Dept: HOME HEALTH SERVICES | Facility: HOSPITAL | Age: 74
End: 2024-06-21
Payer: MEDICARE

## 2024-07-06 ENCOUNTER — DOCUMENT SCAN (OUTPATIENT)
Dept: HOME HEALTH SERVICES | Facility: HOSPITAL | Age: 74
End: 2024-07-06
Payer: MEDICARE

## 2024-07-10 ENCOUNTER — DOCUMENT SCAN (OUTPATIENT)
Dept: HOME HEALTH SERVICES | Facility: HOSPITAL | Age: 74
End: 2024-07-10
Payer: MEDICARE

## 2024-07-24 PROBLEM — N39.0: Status: ACTIVE | Noted: 2024-01-01

## 2024-07-24 PROBLEM — T83.518A: Status: ACTIVE | Noted: 2024-01-01

## 2024-07-24 PROBLEM — R39.81 FUNCTIONAL URINARY INCONTINENCE: Status: RESOLVED | Noted: 2022-02-14 | Resolved: 2024-01-01

## 2024-07-24 PROBLEM — N39.0 URINARY TRACT INFECTION ASSOCIATED WITH INDWELLING URETHRAL CATHETER: Status: ACTIVE | Noted: 2024-01-01

## 2024-07-24 PROBLEM — T83.511A URINARY TRACT INFECTION ASSOCIATED WITH INDWELLING URETHRAL CATHETER: Status: ACTIVE | Noted: 2024-01-01

## 2024-09-15 PROBLEM — E78.5 HYPERLIPIDEMIA: Status: ACTIVE | Noted: 2024-01-01

## 2024-09-15 PROBLEM — N39.0 UTI (URINARY TRACT INFECTION): Chronic | Status: ACTIVE | Noted: 2024-01-01

## 2024-09-15 PROBLEM — Z86.73 HISTORY OF CVA (CEREBROVASCULAR ACCIDENT): Status: ACTIVE | Noted: 2024-01-01

## 2024-09-15 PROBLEM — Z93.59 SUPRAPUBIC CATHETER: Status: ACTIVE | Noted: 2024-01-01

## 2024-09-15 PROBLEM — T83.511A URINARY TRACT INFECTION ASSOCIATED WITH CATHETERIZATION OF URINARY TRACT: Status: ACTIVE | Noted: 2024-01-01

## 2024-09-15 PROBLEM — N39.0 UTI (URINARY TRACT INFECTION): Status: ACTIVE | Noted: 2024-01-01

## 2024-09-15 PROBLEM — I10 ESSENTIAL HYPERTENSION: Status: ACTIVE | Noted: 2024-01-01

## 2024-09-15 PROBLEM — E87.0 HYPERNATREMIA: Status: ACTIVE | Noted: 2024-01-01

## 2024-09-15 PROBLEM — F03.90 DEMENTIA: Status: ACTIVE | Noted: 2024-01-01

## 2024-09-15 PROBLEM — T17.908A ASPIRATION, CHRONIC PULMONARY: Status: ACTIVE | Noted: 2024-01-01

## 2024-09-17 PROBLEM — E87.0 HYPERNATREMIA: Status: RESOLVED | Noted: 2024-01-01 | Resolved: 2024-01-01

## 2024-09-17 PROBLEM — K59.00 CONSTIPATION: Status: RESOLVED | Noted: 2024-01-01 | Resolved: 2024-01-01

## 2024-09-17 PROBLEM — K59.00 CONSTIPATION: Status: ACTIVE | Noted: 2024-01-01

## 2024-09-23 PROBLEM — I95.9 ARTERIAL HYPOTENSION: Status: ACTIVE | Noted: 2024-01-01

## 2024-10-28 PROBLEM — N39.0 URINARY TRACT INFECTION ASSOCIATED WITH INDWELLING URETHRAL CATHETER: Status: RESOLVED | Noted: 2024-01-01 | Resolved: 2024-01-01

## 2024-10-28 PROBLEM — T83.511A URINARY TRACT INFECTION ASSOCIATED WITH INDWELLING URETHRAL CATHETER: Status: RESOLVED | Noted: 2024-01-01 | Resolved: 2024-01-01

## 2024-10-29 PROBLEM — R57.9 SHOCK: Status: ACTIVE | Noted: 2024-01-01

## 2024-10-29 PROBLEM — I46.9 CARDIAC ARREST: Status: ACTIVE | Noted: 2024-01-01

## 2024-10-30 PROBLEM — J96.00 ACUTE RESPIRATORY FAILURE: Status: ACTIVE | Noted: 2024-01-01

## 2024-10-30 PROBLEM — R55 SYNCOPE: Status: RESOLVED | Noted: 2023-09-22 | Resolved: 2024-01-01

## 2024-10-30 PROBLEM — N48.9 PENILE LESION: Status: ACTIVE | Noted: 2024-01-01

## 2024-10-30 PROBLEM — E87.20 METABOLIC ACIDOSIS: Status: ACTIVE | Noted: 2024-01-01

## 2024-10-30 PROBLEM — R79.89 ELEVATED TROPONIN: Status: ACTIVE | Noted: 2024-01-01

## 2024-10-30 PROBLEM — I45.2 BIFASCICULAR BLOCK: Status: ACTIVE | Noted: 2024-01-01

## 2024-10-30 PROBLEM — R56.9 SEIZURE: Status: ACTIVE | Noted: 2024-01-01

## 2024-10-30 NOTE — ASSESSMENT & PLAN NOTE
Respiratory failure which is Acute.  he is not on home oxygen. Supplemental oxygen was provided and noted- Vent Mode: A/C  Oxygen Concentration (%):  [] 40  Resp Rate Total:  [20 br/min-44 br/min] 22 br/min  Vt Set:  [430 mL-450 mL] 450 mL  PEEP/CPAP:  [5 cmH20] 5 cmH20  Mean Airway Pressure:  [5.9 vbZ26-37 cmH20] 10 cmH20    .   Signs/symptoms of respiratory failure include- tachypnea and respiratory distress. Contributing diagnoses includes -  cardiac arrest  Labs and images were reviewed. Patient Has recent ABG, which has been reviewed. Will treat underlying causes and adjust management of respiratory failure as follows-     -Intubated and sedated on prop 50  -Daily ABGs  -Daily CXR  -Wean from vent as tolerated.

## 2024-10-30 NOTE — ASSESSMENT & PLAN NOTE
ABG pH 7.16, CO2 35.1, O2 509.2, HCO3 12.3    -elevated lactic acid 4.8, trending   -500 cc bolus LR   -Monitor with daily ABGs  -Adjust vent settings as needed.

## 2024-10-30 NOTE — SUBJECTIVE & OBJECTIVE
Past Medical History:   Diagnosis Date    High cholesterol     Hypertension     Incontinence of urine     Schizophrenia     Vascular dementia        Past Surgical History:   Procedure Laterality Date    CYSTOSCOPIC LITHOLAPAXY N/A 7/25/2024    Procedure: CYSTOLITHOLAPAXY;  Surgeon: Jamaal Olguin II, MD;  Location: [facility];  Service: Urology;  Laterality: N/A;  complicated patino catheter removal    FLEXIBLE CYSTOSCOPY N/A 7/25/2024    Procedure: CYSTOSCOPY, FLEXIBLE;  Surgeon: aJmaal Olguin II, MD;  Location: [facility];  Service: Urology;  Laterality: N/A;    INSERTION, SUPRAPUBIC CATHETER  7/25/2024    Procedure: INSERTION, SUPRAPUBIC CATHETER;  Surgeon: Jamaal Olguin II, MD;  Location: [facility];  Service: Urology;;    NO PAST SURGERIES         Review of patient's allergies indicates:  No Known Allergies    Current Facility-Administered Medications on File Prior to Encounter   Medication    [COMPLETED] 0.9%  NaCl infusion    [COMPLETED] cefTRIAXone (ROCEPHIN) 2 g in dextrose 5 % 100 mL IVPB (ready to mix)    [COMPLETED] lactated ringers bolus 1,000 mL    [COMPLETED] lactated ringers bolus 1,000 mL    [COMPLETED] levETIRAcetam (Keppra) 2,000 mg in D5W 250 mL IVPB    [COMPLETED] potassium chloride 40 mEq in 100 mL IVPB (FOR CENTRAL LINE ADMINISTRATION ONLY)    [DISCONTINUED] 0.9%  NaCl infusion    [DISCONTINUED] acetaminophen tablet 650 mg    [DISCONTINUED] chlorhexidine 0.12 % solution 15 mL    [DISCONTINUED] EPINEPHrine (ADRENALIN) 5 mg in D5W 250 mL infusion    [DISCONTINUED] levETIRAcetam in NaCl (iso-os) IVPB 1,000 mg    [DISCONTINUED] levETIRAcetam in NaCl (iso-os) IVPB 1,500 mg    [DISCONTINUED] LORazepam injection 2 mg    [DISCONTINUED] melatonin tablet 6 mg    [DISCONTINUED] NORepinephrine 4 mg in dextrose 5% 250 mL infusion (premix)    [DISCONTINUED] pantoprazole injection 40 mg    [DISCONTINUED] piperacillin-tazobactam 3.375 g in dextrose 5 % 100 mL IVPB (ready to mix)    [DISCONTINUED]  piperacillin-tazobactam IVPB 3.375 g    [DISCONTINUED] propofol (DIPRIVAN) 10 mg/mL infusion    [DISCONTINUED] sodium chloride 0.9% flush 10 mL     Current Outpatient Medications on File Prior to Encounter   Medication Sig    alfuzosin (UROXATRAL) 10 mg Tb24 Take 1 tablet (10 mg total) by mouth daily with breakfast.    amLODIPine (NORVASC) 10 MG tablet Take 1 tablet (10 mg total) by mouth once daily.    donepeziL (ARICEPT) 10 MG tablet TAKE ONE TABLET BY MOUTH EVERY EVENING    ferrous gluconate (FERGON) 324 MG tablet Take 324 mg by mouth every other day.    finasteride (PROSCAR) 5 mg tablet Take 1 tablet (5 mg total) by mouth once daily.    FLUoxetine 20 MG capsule TAKE ONE CAPSULE BY MOUTH EVERY MORNING    oxybutynin (DITROPAN-XL) 5 MG TR24 Take 5 mg by mouth once daily.    pravastatin (PRAVACHOL) 40 MG tablet TAKE ONE TABLET BY MOUTH EVERY NIGHT    traMADoL (ULTRAM) 50 mg tablet TAKE TWO TABLETS BY MOUTH THREE TIMES DAILY AS NEEDED FOR PAIN     Family History       Problem Relation (Age of Onset)    Heart attack Mother    Stomach cancer Brother    Stroke Father, Sister, Brother          Tobacco Use    Smoking status: Former    Smokeless tobacco: Never   Substance and Sexual Activity    Alcohol use: Not Currently     Comment: quit 2022    Drug use: No    Sexual activity: Not Currently     Review of Systems   Reason unable to perform ROS: Intubated.     Objective:     Vital Signs (Most Recent):  Temp: 96.4 °F (35.8 °C) (10/30/24 1202)  Pulse: 82 (10/30/24 1202)  Resp: 16 (10/30/24 1202)  BP: (!) 173/83 (10/30/24 1202)  SpO2: 100 % (10/30/24 1202) Vital Signs (24h Range):  Temp:  [94.3 °F (34.6 °C)-98.6 °F (37 °C)] 96.4 °F (35.8 °C)  Pulse:  [] 82  Resp:  [15-43] 16  SpO2:  [99 %-100 %] 100 %  BP: ()/() 173/83     Weight: 64.9 kg (143 lb 1.3 oz)  Body mass index is 21.13 kg/m².    SpO2: 100 %         Intake/Output Summary (Last 24 hours) at 10/30/2024 1327  Last data filed at 10/30/2024 1202  Gross  per 24 hour   Intake 784.66 ml   Output 665 ml   Net 119.66 ml       Lines/Drains/Airways       Central Venous Catheter Line  Duration             Percutaneous Central Line - Triple Lumen  10/29/24 1350 Internal Jugular Right <1 day              Drain  Duration                  NG/OG Tube 10/29/24 1330 Kaufman sump 18 Fr. Left mouth <1 day         Suprapubic Catheter 10/30/24 1255 <1 day              Airway  Duration                  Airway - Non-Surgical 10/29/24 1240 Endotracheal Tube 1 day              Peripheral Intravenous Line  Duration                  Peripheral IV - Single Lumen 10/29/24 1248 18 G Right Antecubital 1 day         Midline Catheter - Single Lumen 10/30/24 1130 Left basilic vein (medial side of arm) 20g x 10cm <1 day                     Physical Exam  Constitutional:       General: He is not in acute distress.     Interventions: He is intubated.   HENT:      Head: Normocephalic and atraumatic.      Right Ear: External ear normal.      Left Ear: External ear normal.   Cardiovascular:      Rate and Rhythm: Normal rate and regular rhythm.      Pulses: Normal pulses.      Heart sounds: No murmur heard.     No friction rub. No gallop.   Pulmonary:      Effort: No respiratory distress. He is intubated.      Breath sounds: Normal breath sounds and air entry. No wheezing or rales.   Abdominal:      General: There is no distension.      Palpations: Abdomen is soft.   Musculoskeletal:      Right lower leg: No edema.      Left lower leg: No edema.   Skin:     General: Skin is cool and dry.      Capillary Refill: Capillary refill takes less than 2 seconds.   Neurological:      GCS: GCS eye subscore is 1. GCS verbal subscore is 1. GCS motor subscore is 3.      Cranial Nerves: Cranial nerve deficit present.      Comments: Corneal reflex negative. Unable to assess gag reflex and cough reflex. Defer pupillary light reflex to neurology.          Significant Labs: ABG:   Recent Labs   Lab 10/29/24  1235  10/30/24  0525   PH 7.163* 7.350   PCO2 35.1 34.8*   HCO3 12.3* 19.2*   POCSATURATED  --  100   BE  --  -6*   , CMP   Recent Labs   Lab 10/29/24  1301 10/30/24  0332 10/30/24  0824 10/30/24  1145   * 147* 144 146*   K 3.2* 4.1 3.8 3.6   * 116* 117* 116*   CO2 14* 17* 18* 18*   * 123* 108 113*   BUN 20 18 16 15   CREATININE 1.45 1.1 0.9 0.8   CALCIUM 10.0 9.6 9.2 8.9   PROT 6.3 6.8  --   --    ALBUMIN 3.3* 3.3*  --   --    BILITOT 0.4 0.3  --   --    ALKPHOS 62 76  --   --    * 96*  --   --    * 83*  --   --    ANIONGAP 18* 14 9 12   , CBC   Recent Labs   Lab 10/29/24  1301 10/30/24  0332   WBC 7.80 12.86*   HGB 8.4* 9.0*   HCT 27.6* 28.9*    177   , and Troponin   Recent Labs   Lab 10/30/24  0332 10/30/24  0824 10/30/24  1145   TROPONINI 1.381* 0.851* 0.725*     Lab Results   Component Value Date    LACTATE 2.2 10/30/2024    LACTATE 2.1 10/30/2024    LACTATE 4.8 () 10/30/2024    LACTATE 4.9 () 10/29/2024    LACTATE 6.0 () 10/29/2024           Significant Imaging: Echocardiogram: Transthoracic echo (TTE) complete (Cupid Only):   Results for orders placed or performed during the hospital encounter of 06/29/20   Echo Color Flow Doppler? Yes   Result Value Ref Range    BSA 2 m2    TDI SEPTAL 0.09 m/s    LV LATERAL E/E' RATIO 12.25 m/s    LV SEPTAL E/E' RATIO 10.89 m/s    LA WIDTH 3.81 cm    Right Atrial Pressure (from IVC) 3 mmHg    TDI LATERAL 0.08 m/s    PV PEAK VELOCITY 1.14 cm/s    LVIDd 4.56 3.5 - 6.0 cm    IVS 0.69 0.6 - 1.1 cm    PW 0.85 0.6 - 1.1 cm    LVIDs 2.98 2.1 - 4.0 cm    FS 35 28 - 44 %    IVC proximal 1.4 cm    EF + QEF 62 %    LA Vol 46.88 cm3    Sinus 3.07 cm    STJ 1.87 cm    Ascending aorta 2.28 cm    LV mass 110.56 g    LA size 3.41 cm    RVDD 3.47 cm    TAPSE 2.69 cm    RV Basal Diameter 7.40 cm    RV S' 16 cm/s    Left Ventricle Relative Wall Thickness 0.37 cm    AV mean gradient 8 mmHg    AV valve area 2.01 cm2    AV Velocity Ratio 0.79     AV index  (prosthetic) 0.67     E/A ratio 2.13     Mean e' 0.09 m/s    E wave deceleration time 274.73 msec    Pulm vein S/D ratio 1.18     LVOT diameter 1.95 cm    LVOT area 3.0 cm2    LVOT peak naresh 1.55 m/s    LVOT peak VTI 25.78 cm    Ao peak naresh 1.97 m/s    Ao VTI 38.24 cm    RVOT prox diameter 2.6 cm    RVOT area 5.31 cm2    LVOT stroke volume 76.95 cm3    AV peak gradient 16 mmHg    TV resting pulmonary artery pressure 29 mmHg    E/E' ratio 11.53 m/s    MV Peak E Naresh 0.98 m/s    TR Max Naresh 2.56 m/s    AV Comp diameter 2.6 cm    AV Comp area 5.31 cm2    MV Peak A Naresh 0.46 m/s    PV Peak S Naresh 0.78 m/s    PV Peak D Naresh 0.66 m/s    LV Systolic Volume 34.52 mL    LV Systolic Volume Index 17.1 mL/m2    LV Diastolic Volume 95.39 mL    LV Diastolic Volume Index 47.15 mL/m2    SELVIN 23.2 mL/m2    LV Mass Index 55 g/m2    RA Major Axis 3.86 cm    Left Atrium Minor Axis 4.51 cm    Left Atrium Major Axis 4.01 cm    Triscuspid Valve Regurgitation Peak Gradient 26 mmHg    SELVIN (MOD) 14.1 mL/m2    LA Vol (MOD) 28.48 cm3    RA Width 3.25 cm    Narrative    1. The left ventricle (LV) is normal in size with normal systolic   function. Normal LV geometry. False LV tendon.  2. There are no significant regional wall motion abnormalities.  3. LV ejection fraction is 55-60%. Normal LV diastolic function.  4. The right ventricle (RV) is normal in size with normal systolic   function.   5. Estimated RVSP is normal (less than 40 mmHg). Pulmonary hypertension is   absent.  6. Normal sized atria.  7. The aortic valve is poorly visualized; appears sclerocalcific -   probably mild stenosis with PV of 1.97 m/s, KATRINA 2.0 cm2, MG 8.  8. No prior studies available for comparison.   , EKG: SR with Bifascicular block, and X-Ray: CXR: X-Ray Chest 1 View (CXR):   Results for orders placed or performed during the hospital encounter of 10/30/24   X-Ray Chest 1 View    Narrative    EXAMINATION:  XR CHEST 1 VIEW    CLINICAL  HISTORY:  intubation;    TECHNIQUE:  Single frontal view of the chest was performed.    COMPARISON:  N 10/29/2024 one    FINDINGS:  Support devices remain.  Heart size normal.  Small ill-defined opacities identified at the lung bases more on the left side..  Otherwise the lungs are clear.  No pleural effusion.      Impression    See above      Electronically signed by: Mauricio Couch MD  Date:    10/30/2024  Time:    08:44

## 2024-10-30 NOTE — HPI
Irvin Nuñez is a 75 yo male with PMHx HTN, HLD, schizophrenia, and vascular dementia who was transferred from OSH ED to [facility] for seizure-like activity post cardiac arrest. Per family at OSH, patient has been having syncopal episodes for the past 4 months which have become much more frequent in the past 1-2 months. Today patient had another syncopal episode and collapsed at home. CPR was initiated by family and received 6 rounds of epi while being transported via [place]. Patient was intubated and ROSC achieved en route. CT head neg for acute intracranial processes. Echo showed EF 67%. EKG NSR. Lactic acid and trop elevated. UA positive for nitrites, leukocyte esterase, WBC, and bacteria. K 3.2. ABG with metabolic acidosis with partial respiratory compensation. While at OSH ED, patient had intermittent twitching of the head and eyes concerning for possible seizure like activity. He was kept on prop 30 for suppression of any possible seizure activity. He was transferred to [facility] for further workup and higher level of care.

## 2024-10-30 NOTE — PLAN OF CARE
[facility]  Initial Discharge Assessment       Primary Care Provider: Alberto Lundberg Jr., MD    Admission Diagnosis: Status epilepticus [G40.901]    Admission Date: 10/30/2024  Expected Discharge Date: 11/5/2024    Transition of Care Barriers: (P) None    Payor: [employer] [facility] / Plan: [employer] / Product Type: Medicare Advantage /     Extended Emergency Contact Information  Primary Emergency Contact: Maryse Campos  Home Phone: [phone]  Mobile Phone: [phone]  Relation: Sister  Secondary Emergency Contact: Anaid Henderson   [facility]  Home Phone: [phone]  Mobile Phone: [phone]  Relation: Relative    Discharge Plan A: (P) Rehab  Discharge Plan B: (P) Home Health      [facility] [place] [address]  [address]  [place] [address]  Phone: [phone] Fax: [phone]    [facility]  [address]  [place] [address]  Phone: [phone] Fax: [phone]    [id number] - [place], [place] - [address] AT [facility] [address]  [address]  [place] [address]  Phone: [phone] Fax: [phone]      Initial Assessment (most recent)       Adult Discharge Assessment - 10/30/24 1506          Discharge Assessment    Assessment Type Discharge Planning Assessment (P)      Confirmed/corrected address, phone number and insurance Yes (P)      Confirmed Demographics Correct on Facesheet (P)      Source of Information family (P)      If unable to respond/provide information was family/caregiver contacted? Yes (P)      Contact Name/Number Sister Maryse Campos [phone] (P)      Communicated ABDULAZIZ with patient/caregiver Date not available/Unable to determine (P)      Reason For Admission Cardiac arrest (P)      People in Home sibling(s) (P)      Facility Arrived From: [facility] (P)      Do you expect to return to your current living situation? Yes (P)       Do you have help at home or someone to help you manage your care at home? Yes (P)      Who are your caregiver(s) and their phone number(s)? Sister Maryse Campos [phone] (P)      Prior to hospitilization cognitive status: Unable to Assess (P)      Current cognitive status: Coma/Sedated/Intubated (P)      Walking or Climbing Stairs Difficulty yes (P)      Walking or Climbing Stairs ambulation difficulty, requires equipment (P)      Mobility Management Walker, wheelchair (P)      Dressing/Bathing Difficulty no (P)      Home Accessibility wheelchair accessible (P)      Home Layout Able to live on 1st floor (P)      Equipment Currently Used at Home wheelchair;walker, rolling;power chair (P)      Readmission within 30 days? Yes (P)      Patient currently being followed by outpatient case management? No (P)      Do you currently have service(s) that help you manage your care at home? Yes (P)      Name and Contact number of agency [facility] (P)      Is the pt/caregiver preference to resume services with current agency Yes (P)      Do you take prescription medications? Yes (P)      Do you have prescription coverage? Yes (P)      Coverage PHN (P)      Do you have any problems affording any of your prescribed medications? No (P)      Is the patient taking medications as prescribed? yes (P)      Who is going to help you get home at discharge? Sister Maryse Campos [phone] (P)      How do you get to doctors appointments? family or friend will provide (P)      Are you on dialysis? No (P)      Do you take coumadin? No (P)      Discharge Plan A Rehab (P)      Discharge Plan B Home Health (P)      DME Needed Upon Discharge  -- (P)    tbd    Discharge Plan discussed with: Sibling (P)      Name(s) and Number(s) Sister Maryse Campos [phone] (P)      Transition of Care Barriers None (P)         OTHER    Name(s) of People in Home Sister Maryse Campos [phone] (P)                    CHAKA completed initial  "discharge assessment with patient's sister, Maryse. Pt's family will provide transportation home.  Pt provided discharge planning booklet with case management contact information provided.      Pt lives in a mobile home with a ramp entry.  Prior to hospitalization, pt was somewhat dependent with ADLs and mobility.  He was able to "get up and get into his wheelchair" for long distances and used his walker to ambulate. Pt has good family support at home.       Pt does not receive coumadin or dialysis.Pt current with [facility]. Pt reportedly compliant with medications.       Discharge Plan A and Plan B have been determined by review of patient's clinical status, future medical and therapeutic needs, and coverage/benefits for post-acute care in coordination with multidisciplinary team members.     MD team is in ongoing conversations with Pt's family regarding goals of care.SW will follow.    PAYAM Christiansen LMSW [facility]  [id number]             "

## 2024-10-30 NOTE — LOPA/MORA/SWTA/AOC/AEB
[facility] ([facility])  On-Site Evaluation  [facility] Contact # [phone]        Thank you for the referral of this patient to determine suitability for organ, tissue, and eye donation.  A chart review has been conducted 10/30/24 at 1310.  [facility] findings are as follows:    ? Potential candidate for organ donation - [facility] following patient. Any changes in patients condition, discussion of withdrawing the vent or brain death exams, or family mention of donation immediately call [phone].    ? Potential for candidate for tissue and eye donation- call [facility] at [phone] within 2 hours of death for screening as a potential tissue and/or eye donor.          [facility] Representative: MARIBELL LuongN, RN, CCRN    [facility] Referral Number:  [id number]

## 2024-10-30 NOTE — ASSESSMENT & PLAN NOTE
- Cardiology consulted, appreciate recs  - Monitor on telemetry  - Avoid beta-blockers  - Call CCU and Cardiology ASAP if patient develops heart block

## 2024-10-30 NOTE — H&P
[facility] - Neuro Critical Care  Neurocritical Care  History & Physical    Admit Date: 10/30/2024  Service Date: 10/30/2024  Length of Stay: 0    Subjective:     Chief Complaint: Cardiac arrest    History of Present Illness: Irvin Nuñez is a 73 yo male with PMHx HTN, HLD, schizophrenia, and vascular dementia who was transferred from OSH ED to [facility] for seizure-like activity post cardiac arrest. Per family at OSH, patient has been having syncopal episodes for the past 4 months which have become much more frequent in the past 1-2 months. Today patient had another syncopal episode and collapsed at home. CPR was initiated by family and received 6 rounds of epi while being transported via [place]. Patient was intubated and ROSC achieved en route. CT head neg for acute intracranial processes. Echo showed EF 67%. EKG NSR. Lactic acid and trop elevated. UA positive for nitrites, leukocyte esterase, WBC, and bacteria. K 3.2. ABG with metabolic acidosis with partial respiratory compensation. While at OSH ED, patient had intermittent twitching of the head and eyes concerning for possible seizure like activity. He was kept on prop 30 for suppression of any possible seizure activity. He was transferred to [facility] for further workup and higher level of care.       Past Medical History:   Diagnosis Date    High cholesterol     Hypertension     Incontinence of urine     Schizophrenia     Vascular dementia      Past Surgical History:   Procedure Laterality Date    CYSTOSCOPIC LITHOLAPAXY N/A 7/25/2024    Procedure: CYSTOLITHOLAPAXY;  Surgeon: Jamaal Olguin II, MD;  Location: [facility];  Service: Urology;  Laterality: N/A;  complicated patino catheter removal    FLEXIBLE CYSTOSCOPY N/A 7/25/2024    Procedure: CYSTOSCOPY, FLEXIBLE;  Surgeon: Jamaal Olguin II, MD;  Location: [facility];  Service: Urology;  Laterality: N/A;    INSERTION, SUPRAPUBIC CATHETER  7/25/2024    Procedure: INSERTION, SUPRAPUBIC CATHETER;  Surgeon: Jamaal Olguin  MD CHARIS;  Location: [facility];  Service: Urology;;    NO PAST SURGERIES        Current Facility-Administered Medications on File Prior to Encounter   Medication Dose Route Frequency Provider Last Rate Last Admin    [COMPLETED] 0.9%  NaCl infusion  1,000 mL Intravenous ED 1 Time Mark Palmer MD   Stopped at 10/29/24 1442    [COMPLETED] cefTRIAXone (ROCEPHIN) 2 g in dextrose 5 % 100 mL IVPB (ready to mix)  2 g Intravenous Once Nicolasa Hanna MD   Stopped at 10/29/24 1641    [COMPLETED] lactated ringers bolus 1,000 mL  1,000 mL Intravenous Once Greg Olivas MD   Stopped at 10/29/24 1919    [COMPLETED] lactated ringers bolus 1,000 mL  1,000 mL Intravenous Once Greg Olivas MD   Stopped at 10/29/24 2030    [COMPLETED] levETIRAcetam (Keppra) 2,000 mg in D5W 250 mL IVPB  2,000 mg Intravenous Once Nicolasa Hanna  mL/hr at 10/29/24 1526 2,000 mg at 10/29/24 1526    [COMPLETED] potassium chloride 40 mEq in 100 mL IVPB (FOR CENTRAL LINE ADMINISTRATION ONLY)  40 mEq Intravenous Once Nicolasa Hanna MD 25 mL/hr at 10/29/24 1819 40 mEq at 10/29/24 1819    [DISCONTINUED] 0.9%  NaCl infusion  1,000 mL Intravenous ED 1 Time Greg Olivas MD        [DISCONTINUED] acetaminophen tablet 650 mg  650 mg Oral Q6H PRN Greg Olivas MD        [DISCONTINUED] chlorhexidine 0.12 % solution 15 mL  15 mL Mouth/Throat BID Nicolasa Hanna MD   15 mL at 10/29/24 2150    [DISCONTINUED] EPINEPHrine (ADRENALIN) 5 mg in D5W 250 mL infusion  0-2 mcg/kg/min Intravenous Continuous Nicolasa Hanna MD   Stopped at 10/29/24 1450    [DISCONTINUED] levETIRAcetam in NaCl (iso-os) IVPB 1,000 mg  1,000 mg Intravenous Q12H Celestino Quevedo MD        [DISCONTINUED] levETIRAcetam in NaCl (iso-os) IVPB 1,500 mg  1,500 mg Intravenous Q12H Tushar Curry MD   Stopped at 10/29/24 2139    [DISCONTINUED] LORazepam injection 2 mg  2 mg Intravenous Q2H PRN Greg Olivas MD   2 mg at 10/29/24 2032    [DISCONTINUED] melatonin tablet 6 mg  6  mg Oral Nightly PRN Nicolasa Hanna MD        [DISCONTINUED] NORepinephrine 4 mg in dextrose 5% 250 mL infusion (premix)  0-3 mcg/kg/min Intravenous Continuous Nicolasa Hanna MD        [DISCONTINUED] pantoprazole injection 40 mg  40 mg Intravenous Daily Nicolasa Hanna MD   40 mg at 10/29/24 1609    [DISCONTINUED] piperacillin-tazobactam 3.375 g in dextrose 5 % 100 mL IVPB (ready to mix)  3.375 g Intravenous Q8H Greg Olivas MD        [DISCONTINUED] piperacillin-tazobactam IVPB 3.375 g  3.375 g Intravenous Q8H Greg Olivas MD   Stopped at 10/29/24 2219    [DISCONTINUED] propofol (DIPRIVAN) 10 mg/mL infusion  0-50 mcg/kg/min Intravenous Continuous Tushar Curry MD 11 mL/hr at 10/29/24 2253 30 mcg/kg/min at 10/29/24 2253    [DISCONTINUED] sodium chloride 0.9% flush 10 mL  10 mL Intravenous PRN Nicolasa Hanna MD         Current Outpatient Medications on File Prior to Encounter   Medication Sig Dispense Refill    alfuzosin (UROXATRAL) 10 mg Tb24 Take 1 tablet (10 mg total) by mouth daily with breakfast. 90 tablet 3    amLODIPine (NORVASC) 10 MG tablet Take 1 tablet (10 mg total) by mouth once daily. 90 tablet 3    donepeziL (ARICEPT) 10 MG tablet TAKE ONE TABLET BY MOUTH EVERY EVENING 30 tablet 11    ferrous gluconate (FERGON) 324 MG tablet Take 324 mg by mouth every other day.      finasteride (PROSCAR) 5 mg tablet Take 1 tablet (5 mg total) by mouth once daily. 90 tablet 3    FLUoxetine 20 MG capsule TAKE ONE CAPSULE BY MOUTH EVERY MORNING 90 capsule 3    oxybutynin (DITROPAN-XL) 5 MG TR24 Take 5 mg by mouth once daily.      pravastatin (PRAVACHOL) 40 MG tablet TAKE ONE TABLET BY MOUTH EVERY NIGHT 30 tablet 11    traMADoL (ULTRAM) 50 mg tablet TAKE TWO TABLETS BY MOUTH THREE TIMES DAILY AS NEEDED FOR PAIN 180 tablet 0      Allergies: Patient has no known allergies.  Family History   Problem Relation Name Age of Onset    Heart attack Mother      Stroke Father      Stroke Sister      Stroke Brother       Stomach cancer Brother       Social History     Tobacco Use    Smoking status: Former    Smokeless tobacco: Never   Substance Use Topics    Alcohol use: Not Currently     Comment: quit 2022    Drug use: No     Review of Systems   Unable to perform ROS: Intubated     Objective:     Vitals:    Temp: (!) 94.6 °F (34.8 °C)  Pulse: (!) 117  BP: (!) 220/111  MAP (mmHg): 157  Resp: (!) 43  SpO2: 100 %  Oxygen Concentration (%): 50  Vent Mode: A/C  Set Rate: 20 BPM  Vt Set: 430 mL  PEEP/CPAP: 5 cmH20  Peak Airway Pressure: 18 cmH20  Mean Airway Pressure: 8.3 cmH20    Temp  Min: 94.6 °F (34.8 °C)  Max: 98.6 °F (37 °C)  Pulse  Min: 59  Max: 117  BP  Min: 74/45  Max: 267/162  MAP (mmHg)  Min: 54  Max: 201  Resp  Min: 15  Max: 43  SpO2  Min: 99 %  Max: 100 %  Oxygen Concentration (%)  Min: 40  Max: 100    No intake/output data recorded.            Physical Exam  Constitutional:       Appearance: He is ill-appearing.   HENT:      Head: Normocephalic.      Right Ear: External ear normal.      Left Ear: External ear normal.      Nose: Nose normal.      Mouth/Throat:      Mouth: Mucous membranes are moist.   Eyes:      Conjunctiva/sclera: Conjunctivae normal.   Cardiovascular:      Rate and Rhythm: Regular rhythm. Tachycardia present.   Pulmonary:      Effort: Pulmonary effort is normal. No respiratory distress.   Abdominal:      General: There is no distension.      Palpations: Abdomen is soft.      Tenderness: There is no abdominal tenderness.   Genitourinary:     Comments: Suprapubic catheter in place. Multiple ulcerative lesions covering the glans. No visible penile discharge or purulence.   Skin:     General: Skin is warm.      Findings: Rash present.      Comments: Brown macular rash covering bilateral palms and soles.    Neurological:      Comments:   Exam performed under prop 50.    GCS 5.  Patient is unresponsive and nonverbal.   Right pupil is pinpoint but reactive. Cataract is present in left eye.   Eyes remain  open.  Withdrawing in RUE and BLE.  Posturing in LUE.  No corneal, cough, or gag.        Unable to test orientation, language, memory, judgment, insight, fund of knowledge, hearing, shoulder shrug, tongue protrusion, coordination, gait due to level of consciousness.       Today I personally reviewed pertinent medications, lines/drains/airways, imaging, cardiology results, laboratory results, microbiology results,    Assessment/Plan:     Neuro  Seizure  Patient exhibited head and eye twitching at outside hospital post cardiac arrest. CT head negative for acute intracranial processes. Transferred to [facility] for further care.     -q1h neuro checks, formal EEG in AM  -cap EEG tonight  -sedated on prop 50 and fentanyl 50 q1h with burst suppression   -SBP < 180       Vascular dementia  -Restart home meds as see fit.    Pulmonary  Acute respiratory failure  Respiratory failure which is Acute.  he is not on home oxygen. Supplemental oxygen was provided and noted- Vent Mode: A/C  Oxygen Concentration (%):  [] 40  Resp Rate Total:  [20 br/min-44 br/min] 22 br/min  Vt Set:  [430 mL-450 mL] 450 mL  PEEP/CPAP:  [5 cmH20] 5 cmH20  Mean Airway Pressure:  [5.9 ngE52-71 cmH20] 10 cmH20    .   Signs/symptoms of respiratory failure include- tachypnea and respiratory distress. Contributing diagnoses includes -  cardiac arrest  Labs and images were reviewed. Patient Has recent ABG, which has been reviewed. Will treat underlying causes and adjust management of respiratory failure as follows-     -Intubated and sedated on prop 50  -Daily ABGs  -Daily CXR  -Wean from vent as tolerated.     Cardiac/Vascular  * Cardiac arrest  Patient collapsed after syncopal and went into cardiac arrest. CPR initiated by family. ROSC after 6 rounds of epi. No documentation of rhythm or shocks delivered.     -TTM, goal temp is 36 C  -lactic acid elevated, trending   -Echo 10/29 EF 67%, no wall motion abnormalities, no severe valvular disease   -EKG 10/29  NSR with LAD and new RBBB  -trop elevated, trending   -cardiology paged for recommendations  -intubated  -sedated on prop 50   -MRI after completion of TTM for neuro prognostication.  -Ordered neuro specific enolase.        Elevated troponin  Trop uptrending from 0.2 to 1.38    -trop q6h until downtrending  -Echo 10/29 EF 67%, no wall motion abnormalities, no severe valvular disease   -EKG 10/29 NSR with LAD and new RBBB  -cardiology consult for possible ACS protocol    Hyperlipidemia  -Continue home pravastatin 40 mg.     Essential hypertension  -Continue home amlodipine 10 mg.  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    -Treponema pallidum antibodies ordered  -HIV 1/2 Ab/Ag ordered    Metabolic acidosis  ABG pH 7.16, CO2 35.1, O2 509.2, HCO3 12.3    -elevated lactic acid 4.8, trending   -500 cc bolus LR   -Monitor with daily ABGs  -Adjust vent settings as needed.     Urinary tract infection associated with catheterization of urinary tract  UA positive for nitrites, leukocyte esterase, WBC, and bacteria.    -Started rocephin 1g daily.  -Pending urine culture.  -Suprapubic catheter in place.     Oncology  Anemia  -Monitor daily CBC.  -Transfuse if Hgb < 7.           Advance Care Planning     Date: 10/30/2024    Patient is full code. See supportive care note from 10/29 for details.         The patient is being Prophylaxed for:  Venous Thromboembolism with: Chemical  Stress Ulcer with: H2B  Ventilator Pneumonia with: chlorhexidine oral care    Activity Orders            Progressive Mobility Protocol (mobilize patient to their highest level of functioning at least twice daily) starting at 10/30 0800    Turn patient every 2 hours starting at 10/30 0600          Full Code    Critical care time spent on the evaluation and treatment of severe organ dysfunction, review of pertinent labs and imaging studies, discussions with consulting providers and discussions with  patient/family: 50 minutes.     Maya Cruz PA-C  Neurocritical Care  [facility] - Neuro Critical Care

## 2024-10-30 NOTE — ASSESSMENT & PLAN NOTE
Patient exhibited head and eye twitching at outside hospital post cardiac arrest. CT head negative for acute intracranial processes. Transferred to [facility] [place] care.     -q1h neuro checks, formal EEG in AM  -cap EEG tonight  -sedated on prop 50 and fentanyl 50 q1h with burst suppression   -SBP < 180

## 2024-10-30 NOTE — ASSESSMENT & PLAN NOTE
Patient collapsed after syncopal and went into cardiac arrest. CPR initiated by family. ROSC after 6 rounds of epi. No documentation of rhythm or shocks delivered.     -TTM, goal temp is 36 C  -lactic acid elevated, trending   -Echo 10/29 EF 67%, no wall motion abnormalities, no severe valvular disease   -EKG 10/29 NSR with LAD and new RBBB  -trop elevated, trending   -cardiology paged for recommendations  -intubated  -sedated on prop 50   -MRI after completion of TTM for neuro prognostication.  -Ordered neuro specific enolase.

## 2024-10-30 NOTE — ASSESSMENT & PLAN NOTE
Trop uptrending from 0.2 to 1.38    -trop q6h until downtrending  -Echo 10/29 EF 67%, no wall motion abnormalities, no severe valvular disease   -EKG 10/29 NSR with LAD and new RBBB  -cardiology consult for possible ACS protocol

## 2024-10-30 NOTE — PLAN OF CARE
10/30/24 1513   Readmission   Was this a planned readmission? No   Why were you hospitalized in the last 30 days? Syncope   Why were you readmitted? Alarmed about signs/symptoms;New medical problem   When you left the hospital how did you feel? Pt could not answer   When you left the hospital where did you go? Home with Home Health   Did patient/caregiver refused recommended DC plan? No   Tell me about what happened between when you left the hospital and the day you returned. Pt has been experiencing syncope for months, worsening over the last two months   When did you start not feeling well? 10/29/24   Did you try to manage your symptoms your self? No   Did you call anyone? No   Why? Cardiac arrest, EMS brought to hospital   Did you have  a follow-up appointment on discharge? Yes   Did you go? No   Why?   (appt hadn't happened yet)

## 2024-10-30 NOTE — PROCEDURES
EEG note:    EEG cap not interpretable, suggest standard EEG electrodes for further evaluation.     Rosemary Coppola MD  [facility]   Department of Neurology/Epilepsy

## 2024-10-30 NOTE — ASSESSMENT & PLAN NOTE
UA positive for nitrites, leukocyte esterase, WBC, and bacteria.    -Started rocephin 1g daily.  -Pending urine culture.  -Suprapubic catheter in place.

## 2024-10-30 NOTE — PLAN OF CARE
[facility] Care Plan    POC reviewed with Irvin Joaquin Stock and family at 0300. Patient unable to verbalize understanding. Questions and concerns addressed. No acute events today. Pt progressing toward goals. Will continue to monitor. See below and flowsheets for full assessment and VS info.   -TTM initiated, Core temp goal of 36.0 C was achieved at 0408  -Prop gtt  -EEG Cap placed   -Fent IVP x2  -500ml LR bolus          Is this a stroke patient? no    Neuro:  [place] Coma Scale  Best Eye Response: 1-->(E1) none  Best Motor Response: 1-->(M1) none  Best Verbal Response: 1-->(V1) none  [place] Coma Scale Score: 3  Assessment Qualifiers: patient intubated, patient chemically sedated or paralyzed  Pupil PERRLA: no     24 hr Temp:  [94.3 °F (34.6 °C)-98.6 °F (37 °C)]     CV:   Rhythm: sinus arrhythmia  BP goals:   SBP < 180  MAP > 65    Resp:      Vent Mode: A/C  Set Rate: 20 BPM  Oxygen Concentration (%): 50  Vt Set: 450 mL  PEEP/CPAP: 5 cmH20    Plan: wean to extubate    GI/:     Diet/Nutrition Received: NPO  Last Bowel Movement: 10/28/24  Voiding Characteristics: suprapubic catheter    Intake/Output Summary (Last 24 hours) at 10/30/2024 0536  Last data filed at 10/30/2024 0502  Gross per 24 hour   Intake 34.48 ml   Output --   Net 34.48 ml          Labs/Accuchecks:  Recent Labs   Lab 10/30/24  0332   WBC 12.86*   RBC 4.14*   HGB 9.0*   HCT 28.9*         Recent Labs   Lab 10/30/24  0332   *   K 4.1   CO2 17*   *   BUN 18   CREATININE 1.1   ALKPHOS 76   ALT 83*   AST 96*   BILITOT 0.3      Recent Labs   Lab 10/30/24  0332   INR 1.1   APTT 29.3      Recent Labs   Lab 10/30/24  0332   CPK 1634*   TROPONINI 1.381*       Electrolytes: N/A - electrolytes WDL  Accuchecks: Q1H    Gtts:   propofol  50 mcg/kg/min Intravenous Continuous 19.5 mL/hr at 10/30/24 0509 50 mcg/kg/min at 10/30/24 0509       LDA/Wounds:    Hesham Risk Assessment  Sensory Perception: 1-->completely limited  Moisture: 3-->occasionally  moist  Activity: 1-->bedfast  Mobility: 1-->completely immobile  Nutrition: 1-->very poor  Friction and Shear: 2-->potential problem  Hesham Score: 9  Is your hesham score 12 or less? yes enrolled in the peach program and heel boots on    Nurses Note -- 4 Eyes    Is there altered skin present?  yes     Please check the following boxes that apply:   [x] LDA Added if Not in Epic (Describe Wound)   [x] New Altered Skin Integrity was Present on Admit and Documented in LDA   [x] Wound Image Taken    Wound Care Consulted? Yes     Second RN/Staff Member:  Jazmine      Restraints:        MARIMAR

## 2024-10-30 NOTE — PLAN OF CARE
Recommendations     1.) TF recs:                  - if pt remains sedated with Propofol, recommend decreasing Impact Peptide 1.5 (or Pivot 1.5 as alternate) at 30ml/hr to provide 1080 kcal, 68g PRO, and 554ml FF + Kcal from Propofol (~515 kcal) - FWF per MD.                  - EEN based on current vent settings.      2.) If pt is extubated, and off Propofol sedation, recommend changing TF to Isosource 1.5 at trickle rate and slowly advancing to goal rate of 50ml/hr to provide 1800 kcal, 82g PRO, and 917ml FF- FWF per MD.      3.) Monitor for s/s of intolerance such as residuals >500ml, n/v/d, or abdominal distension.       4.) Once extubated, ADAT- per SLP/MD.      5.) RD to monitor wt, tolerance, skin, labs.      Goals: to meet % of EEN/EPN by next RD f/u  Nutrition Goal Status: new  Communication of RD Recs:  (POC)

## 2024-10-30 NOTE — ASSESSMENT & PLAN NOTE
74-year-old man with a witnessed out-of-hospital arrest with with no known history of CAD or structural heart disease.  Found to have new bifascicular block on EKG.  Now undergoing therapeutic hypothermia.  Echo is essentially normal.    Recommendations:  - monitor telemetry and if patient goes into a first-degree AV block contact Cardiology and we will insert a TVP  - if extubated and neurologically intact, re-consult us for ischemic workup

## 2024-10-30 NOTE — ASSESSMENT & PLAN NOTE
Elevated troponin in the setting of out-of-hospital arrest.  Troponin peaked at 1.381 now downtrending.  Increased troponin likely caused by chest compressions and subsequent cardiac contusion.

## 2024-10-30 NOTE — PROCEDURES
RAPID RESPONSE VASCULAR ACCESS NOTE       Bed:9098/9098 A    Single lumen 20G, 10CM midline placed in the left cephalic vein. Needle advanced into the vessel under real time ultrasound guidance.    Attempts: 1   Max dwell date: 11/30/2024  Lot number: [id number]      Maria L Flores RN

## 2024-10-30 NOTE — NURSING
Patient arrived to [facility] from [place] by EMS    Report received from: OSJASE RN     Type of stroke/diagnosis: Post Cardiac arrest/Sz workup     Current symptoms: Intubated, withdraws in lower and R upper extremity, left upper posturing, L pupil with cataracts, R pupil 2 and brisk, no cough, gag or corneals    Skin Assessment done: Yes  Wounds noted: Penile abrasions   *If wounds noted, was Wound Care consulted? Yes   *If wounds noted, LDA placed? Yes  Skin Assessment Verified by:  LINDA Stanford RN [place] Completed? Failed, intubated and sedated     Patient Belongings on Admit: None    NCC notified: RENUKA Cruz

## 2024-10-30 NOTE — NURSING
Pranav CALI with supervision under Julee GRAYSON at bedside do insert arterial line. Consents in chart. WCTM      Pt tolerated procedure well, R radial A-line in place.

## 2024-10-30 NOTE — ASSESSMENT & PLAN NOTE
Multiple penile ulcerations on glans.    -Treponema pallidum antibodies ordered  -HIV 1/2 Ab/Ag ordered

## 2024-10-30 NOTE — SUBJECTIVE & OBJECTIVE
Past Medical History:   Diagnosis Date    High cholesterol     Hypertension     Incontinence of urine     Schizophrenia     Vascular dementia      Past Surgical History:   Procedure Laterality Date    CYSTOSCOPIC LITHOLAPAXY N/A 7/25/2024    Procedure: CYSTOLITHOLAPAXY;  Surgeon: Jamaal Olguin II, MD;  Location: [facility];  Service: Urology;  Laterality: N/A;  complicated patino catheter removal    FLEXIBLE CYSTOSCOPY N/A 7/25/2024    Procedure: CYSTOSCOPY, FLEXIBLE;  Surgeon: Jamaal Olguin II, MD;  Location: [facility];  Service: Urology;  Laterality: N/A;    INSERTION, SUPRAPUBIC CATHETER  7/25/2024    Procedure: INSERTION, SUPRAPUBIC CATHETER;  Surgeon: Jamaal Olguin II, MD;  Location: [facility];  Service: Urology;;    NO PAST SURGERIES        Current Facility-Administered Medications on File Prior to Encounter   Medication Dose Route Frequency Provider Last Rate Last Admin    [COMPLETED] 0.9%  NaCl infusion  1,000 mL Intravenous ED 1 Time Mark Palmer MD   Stopped at 10/29/24 1442    [COMPLETED] cefTRIAXone (ROCEPHIN) 2 g in dextrose 5 % 100 mL IVPB (ready to mix)  2 g Intravenous Once Nicolasa Hanna MD   Stopped at 10/29/24 1641    [COMPLETED] lactated ringers bolus 1,000 mL  1,000 mL Intravenous Once rGeg Olivas MD   Stopped at 10/29/24 1919    [COMPLETED] lactated ringers bolus 1,000 mL  1,000 mL Intravenous Once Greg Olivas MD   Stopped at 10/29/24 2030    [COMPLETED] levETIRAcetam (Keppra) 2,000 mg in D5W 250 mL IVPB  2,000 mg Intravenous Once Nicolasa Hanna  mL/hr at 10/29/24 1526 2,000 mg at 10/29/24 1526    [COMPLETED] potassium chloride 40 mEq in 100 mL IVPB (FOR CENTRAL LINE ADMINISTRATION ONLY)  40 mEq Intravenous Once Nicolasa Hanna MD 25 mL/hr at 10/29/24 1819 40 mEq at 10/29/24 1819    [DISCONTINUED] 0.9%  NaCl infusion  1,000 mL Intravenous ED 1 Time Greg Olivas MD        [DISCONTINUED] acetaminophen tablet 650 mg  650 mg Oral Q6H PRN Greg Olivas MD         [DISCONTINUED] chlorhexidine 0.12 % solution 15 mL  15 mL Mouth/Throat BID Nicolasa Hanna MD   15 mL at 10/29/24 2150    [DISCONTINUED] EPINEPHrine (ADRENALIN) 5 mg in D5W 250 mL infusion  0-2 mcg/kg/min Intravenous Continuous Nicolasa Hanna MD   Stopped at 10/29/24 1450    [DISCONTINUED] levETIRAcetam in NaCl (iso-os) IVPB 1,000 mg  1,000 mg Intravenous Q12H Celestino Quevedo MD        [DISCONTINUED] levETIRAcetam in NaCl (iso-os) IVPB 1,500 mg  1,500 mg Intravenous Q12H Tushar Curry MD   Stopped at 10/29/24 2139    [DISCONTINUED] LORazepam injection 2 mg  2 mg Intravenous Q2H PRN Greg Olivas MD   2 mg at 10/29/24 2032    [DISCONTINUED] melatonin tablet 6 mg  6 mg Oral Nightly PRN Nicolasa Hanna MD        [DISCONTINUED] NORepinephrine 4 mg in dextrose 5% 250 mL infusion (premix)  0-3 mcg/kg/min Intravenous Continuous Nicolasa Hanna MD        [DISCONTINUED] pantoprazole injection 40 mg  40 mg Intravenous Daily Nicolasa Hanna MD   40 mg at 10/29/24 1609    [DISCONTINUED] piperacillin-tazobactam 3.375 g in dextrose 5 % 100 mL IVPB (ready to mix)  3.375 g Intravenous Q8H Greg Olivas MD        [DISCONTINUED] piperacillin-tazobactam IVPB 3.375 g  3.375 g Intravenous Q8H Greg Olivas MD   Stopped at 10/29/24 2219    [DISCONTINUED] propofol (DIPRIVAN) 10 mg/mL infusion  0-50 mcg/kg/min Intravenous Continuous Tushar Curry MD 11 mL/hr at 10/29/24 2253 30 mcg/kg/min at 10/29/24 2253    [DISCONTINUED] sodium chloride 0.9% flush 10 mL  10 mL Intravenous PRN Nicolasa Hanna MD         Current Outpatient Medications on File Prior to Encounter   Medication Sig Dispense Refill    alfuzosin (UROXATRAL) 10 mg Tb24 Take 1 tablet (10 mg total) by mouth daily with breakfast. 90 tablet 3    amLODIPine (NORVASC) 10 MG tablet Take 1 tablet (10 mg total) by mouth once daily. 90 tablet 3    donepeziL (ARICEPT) 10 MG tablet TAKE ONE TABLET BY MOUTH EVERY EVENING 30 tablet 11    ferrous gluconate (FERGON) 324 MG  tablet Take 324 mg by mouth every other day.      finasteride (PROSCAR) 5 mg tablet Take 1 tablet (5 mg total) by mouth once daily. 90 tablet 3    FLUoxetine 20 MG capsule TAKE ONE CAPSULE BY MOUTH EVERY MORNING 90 capsule 3    oxybutynin (DITROPAN-XL) 5 MG TR24 Take 5 mg by mouth once daily.      pravastatin (PRAVACHOL) 40 MG tablet TAKE ONE TABLET BY MOUTH EVERY NIGHT 30 tablet 11    traMADoL (ULTRAM) 50 mg tablet TAKE TWO TABLETS BY MOUTH THREE TIMES DAILY AS NEEDED FOR PAIN 180 tablet 0      Allergies: Patient has no known allergies.  Family History   Problem Relation Name Age of Onset    Heart attack Mother      Stroke Father      Stroke Sister      Stroke Brother      Stomach cancer Brother       Social History     Tobacco Use    Smoking status: Former    Smokeless tobacco: Never   Substance Use Topics    Alcohol use: Not Currently     Comment: quit 2022    Drug use: No     Review of Systems   Unable to perform ROS: Intubated     Objective:     Vitals:    Temp: (!) 94.6 °F (34.8 °C)  Pulse: (!) 117  BP: (!) 220/111  MAP (mmHg): 157  Resp: (!) 43  SpO2: 100 %  Oxygen Concentration (%): 50  Vent Mode: A/C  Set Rate: 20 BPM  Vt Set: 430 mL  PEEP/CPAP: 5 cmH20  Peak Airway Pressure: 18 cmH20  Mean Airway Pressure: 8.3 cmH20    Temp  Min: 94.6 °F (34.8 °C)  Max: 98.6 °F (37 °C)  Pulse  Min: 59  Max: 117  BP  Min: 74/45  Max: 267/162  MAP (mmHg)  Min: 54  Max: 201  Resp  Min: 15  Max: 43  SpO2  Min: 99 %  Max: 100 %  Oxygen Concentration (%)  Min: 40  Max: 100    No intake/output data recorded.            Physical Exam  Constitutional:       Appearance: He is ill-appearing.   HENT:      Head: Normocephalic.      Right Ear: External ear normal.      Left Ear: External ear normal.      Nose: Nose normal.      Mouth/Throat:      Mouth: Mucous membranes are moist.   Eyes:      Conjunctiva/sclera: Conjunctivae normal.   Cardiovascular:      Rate and Rhythm: Regular rhythm. Tachycardia present.   Pulmonary:      Effort:  Pulmonary effort is normal. No respiratory distress.   Abdominal:      General: There is no distension.      Palpations: Abdomen is soft.      Tenderness: There is no abdominal tenderness.   Genitourinary:     Comments: Suprapubic catheter in place. Multiple ulcerative lesions covering the glans. No visible penile discharge or purulence.   Skin:     General: Skin is warm.      Findings: Rash present.      Comments: Brown macular rash covering bilateral palms and soles.    Neurological:      Comments:   Exam performed under prop 50.    GCS 5.  Patient is unresponsive and nonverbal.   Right pupil is pinpoint but reactive. Cataract is present in left eye.   Eyes remain open.  Withdrawing in RUE and BLE.  Posturing in LUE.  No corneal, cough, or gag.        Unable to test orientation, language, memory, judgment, insight, fund of knowledge, hearing, shoulder shrug, tongue protrusion, coordination, gait due to level of consciousness.       Today I personally reviewed pertinent medications, lines/drains/airways, imaging, cardiology results, laboratory results, microbiology results,

## 2024-10-30 NOTE — CONSULTS
[facility]  Adult Nutrition  Consult Note    SUMMARY     Recommendations    1.) TF recs:      - if pt remains sedated with Propofol, recommend decreasing Impact Peptide 1.5 (or Pivot 1.5 as alternate) at 30ml/hr to provide 1080 kcal, 68g PRO, and 554ml FF + Kcal from Propofol (~515 kcal) - FWF per MD.      - EEN based on current vent settings.     2.) If pt is extubated, and off Propofol sedation, recommend changing TF to Isosource 1.5 at trickle rate and slowly advancing to goal rate of 50ml/hr to provide 1800 kcal, 82g PRO, and 917ml FF- FWF per MD.     3.) Monitor for s/s of intolerance such as residuals >500ml, n/v/d, or abdominal distension.      4.) Once extubated, ADAT- per SLP/MD.     5.) RD to monitor wt, tolerance, skin, labs.     Goals: to meet % of EEN/EPN by next RD f/u  Nutrition Goal Status: new  Communication of RD Recs:  (POC)    Assessment and Plan    Nutrition Problem  Inadequate oral intake    Related to (etiology):   Inability to consume sufficient energy by mouth    Signs and Symptoms (as evidenced by):   NPO, need for nutrition support     Interventions/Recommendations (treatment strategy):  Collaboration of nutritional care with other providers.     Nutrition Diagnosis Status:   New     Reason for Assessment    Reason For Assessment: consult, new tube feeding  Diagnosis: cardiac disease (Cardiac arrest)    General Information Comments: RD consulted for TF recs. PMHx: HTN, HLD, schizophrenia, vascular dementia. Pt arrived to [facility], intubated, on vent and sedated with Propofol running at 19.5ml/hr providing around 515 kcal. Per chart review, RD noted insignificant wt loss at the 12 month marker: -10%. RD to perform NFPE at f/u if medically warranted. RD team to continue to monitor and f/u.     Nutrition Discharge Planning: as per medical course    Nutrition Related Social Determinants of Health: SDOH: Unable to assess at this time.      Nutrition Risk  "Screen    Nutrition Risk Screen: tube feeding or parenteral nutrition    Nutrition/Diet History    Spiritual, Cultural Beliefs, Hindu Practices, Values that Affect Care: no  Food Allergies: NKFA  Factors Affecting Nutritional Intake: NPO, on mechanical ventilation    Anthropometrics    Temp: 96.3 °F (35.7 °C)  Height Method: Estimated  Height: 5' 9" (175.3 cm)  Height (inches): 69 in  Weight: 64.9 kg (143 lb 1.3 oz)  Weight (lb): 143.08 lb  Ideal Body Weight (IBW), Male: 160 lb  % Ideal Body Weight, Male (lb): 89.43 %  BMI (Calculated): 21.1    Lab/Procedures/Meds    Pertinent Labs Reviewed: reviewed  Pertinent Labs Comments: AST: 96, ALT: 83    Pertinent Medications Reviewed: reviewed  Pertinent Medications Comments: Famotidine, heparin, abx, polyethylene glycol, statin, senna-docusate, NaCl, propofol    Estimated/Assessed Needs    Weight Used For Calorie Calculations: 65 kg (143 lb 4.8 oz)  Energy Calorie Requirements (kcal): 1625- 1950 kcal  Energy Need Method: Kcal/kg (25-30 kcal/kg)    Protein Requirements: 78- 98g (1.2-1.5g/kg)  Weight Used For Protein Calculations: 65 kg (143 lb 4.8 oz)    Fluid Requirements (mL): as per MD  RDA Method (mL): 1625    Nutrition Prescription Ordered    Current Diet Order: NPO  Current Nutrition Support Formula Ordered: Impact Peptide 1.5  Current Nutrition Support Rate Ordered: 40 (ml)  Current Nutrition Support Frequency Ordered: continuous    Evaluation of Received Nutrient/Fluid Intake    Enteral Calories (kcal): 1440  Enteral Protein (gm): 90  Enteral (Free Water) Fluid (mL): 739  Other Calories (kcal): 515 (from Propofol running at 19.5ml/hr)  I/O: incomplete  Energy Calories Required: meeting needs  Protein Required: meeting needs  Fluid Required:  (as per MD)  Comments: LBM 10/28  Tolerance: tolerating  % Intake of Estimated Energy Needs: 0 - 25 %  % Meal Intake: NPO    Nutrition Risk    Level of Risk/Frequency of Follow-up: low - moderate     Monitor and " Evaluation    Food and Nutrient Intake: enteral nutrition intake  Food and Nutrient Adminstration: enteral and parenteral nutrition administration  Anthropometric Measurements: weight, weight change, body mass index  Biochemical Data, Medical Tests and Procedures: electrolyte and renal panel, gastrointestinal profile, glucose/endocrine profile, inflammatory profile, lipid profile  Nutrition-Focused Physical Findings: overall appearance, skin     Nutrition Follow-Up    RD Follow-up?: Yes

## 2024-10-30 NOTE — PLAN OF CARE
[facility] Care Plan  POC reviewed with Irivn Nuñez Jr. and family at 1100. Family verbalized understanding. Questions and concerns addressed with sister at bedside today. Will continue to monitor. See below and flowsheets for full assessment and VS info.     - R IJ TLC removed   - ARABELLA Midline placed  - Suprapubic cath replaced and UA sent.  - Blood cultures completed   - Swap of penile lesions sent   - Artic sun pads in place, goal temp of 36C to be maintained for 24 hours   - NS at 75ml/hr for 24 hours  - Arterial line placed  - Bath and linen change complete   - Prop @ 25 (Non titrating)       Is this a stroke patient? no    Neuro:  Mary Coma Scale  Best Eye Response: 1-->(E1) none  Best Motor Response: 1-->(M1) none  Best Verbal Response: 1-->(V1) none  [place] Coma Scale Score: 3  Assessment Qualifiers: patient intubated  Pupil PERRLA: no     24 hr Temp:  [94.3 °F (34.6 °C)-98.6 °F (37 °C)]     CV:   Rhythm: normal sinus rhythm  BP goals:   SBP < 180  MAP > 65    Resp:      Vent Mode: A/C  Set Rate: 16 BPM  Oxygen Concentration (%): 40  Vt Set: 450 mL  PEEP/CPAP: 5 cmH20    Plan:  Intubated and sedated    GI/:     Diet/Nutrition Received: NPO  Last Bowel Movement: 10/28/24  Voiding Characteristics: suprapubic catheter    Intake/Output Summary (Last 24 hours) at 10/30/2024 1826  Last data filed at 10/30/2024 1802  Gross per 24 hour   Intake 1532.98 ml   Output 910 ml   Net 622.98 ml          Labs/Accuchecks:  Recent Labs   Lab 10/30/24  0332   WBC 12.86*   RBC 4.14*   HGB 9.0*   HCT 28.9*         Recent Labs   Lab 10/30/24  0332 10/30/24  0824 10/30/24  1145   *   < > 146*   K 4.1   < > 3.6   CO2 17*   < > 18*   *   < > 116*   BUN 18   < > 15   CREATININE 1.1   < > 0.8   ALKPHOS 76  --   --    ALT 83*  --   --    AST 96*  --   --    BILITOT 0.3  --   --     < > = values in this interval not displayed.      Recent Labs   Lab 10/30/24  0332   INR 1.1   APTT 29.3      Recent Labs   Lab  10/30/24  1145   CPK 2017*   TROPONINI 0.725*       Electrolytes: No replacement orders  Accuchecks: none    Gtts:   0.9% NaCl   Intravenous Continuous 75 mL/hr at 10/30/24 1802 Rate Verify at 10/30/24 1802    propofol  25 mcg/kg/min Intravenous Continuous 9.7 mL/hr at 10/30/24 1810 25 mcg/kg/min at 10/30/24 1810       LDA/Wounds:    Hesham Risk Assessment  Sensory Perception: 1-->completely limited  Moisture: 3-->occasionally moist  Activity: 1-->bedfast  Mobility: 1-->completely immobile  Nutrition: 1-->very poor  Friction and Shear: 2-->potential problem  Hesham Score: 9    Is your hesham score 12 or less? yes heel boots on      Nurses Note -- 4 Eyes    Is there altered skin present?  yes     Please check the following boxes that apply:   [x] LDA Added if Not in Epic (Describe Wound)   [x] New Altered Skin Integrity was Present on Admit and Documented in LDA   [x] Wound Image Taken    Wound Care Consulted? YES     Second RN/Staff Member:  LINDA Kirby

## 2024-10-30 NOTE — CONSULTS
[facility]  Cardiology  Consult Note    Patient Name: Irvin Nuñez Jr.  MRN: [id number]  Admission Date: 10/30/2024  Hospital Length of Stay: 0 days  Code Status: Full Code   Attending Provider: Rajan Romero MD   Consulting Provider: Raheem Templeton MD  Primary Care Physician: Alberto Lundberg Jr., MD  Principal Problem:Cardiac arrest    Patient information was obtained from past medical records and ER records.     Inpatient consult to Cardiology  Consult performed by: Raheem Templeton MD  Consult ordered by: Maya Cruz PA-C        Subjective:     Chief Complaint:  Cardiac Arrest     HPI:   Cardiology is consulted for elevated Tn in the setting of OOH arrest    Irvin Nuñez Jr is a 74 y.o. M w/ a PMH significant for HTN, HLD, vascular dementia, and schizophrenia who is non-verbal at baseline.  Per medical records, the patient was in his usual state of health on 10/28 when he had a witnessed out-of-hospital arrest.  His family started CPR immediately.  When EMS arrived, he was intubated for airway protection and CPR was continued in route to [facility].  Ross was achieved in route after 5 rounds of epinephrine and 1 round of calcium gluconate.  Patient's family states that he has been having syncopal episodes for the past 4 months and when they occur he will wake up on the floor.  Family states that these have been increasing in frequency over the past 1-2 months and that it has been happening almost daily now.     Initial labs revealed a lactic acid of 6 and a troponin of less than 0.006.  His lactic acid has now trended down and has normalized at 2.1.  His troponin peaked at 1.381 and is now downtrending.  EKG reveals a new bifascicular block.  Echo shows an EF of 67% and no wall motion abnormalities or significant valvular issues.      Past Medical History:   Diagnosis Date    High cholesterol     Hypertension     Incontinence of urine     Schizophrenia     Vascular dementia         Past Surgical History:   Procedure Laterality Date    CYSTOSCOPIC LITHOLAPAXY N/A 7/25/2024    Procedure: CYSTOLITHOLAPAXY;  Surgeon: Jamaal Olguin II, MD;  Location: [facility];  Service: Urology;  Laterality: N/A;  complicated patino catheter removal    FLEXIBLE CYSTOSCOPY N/A 7/25/2024    Procedure: CYSTOSCOPY, FLEXIBLE;  Surgeon: Jamaal Olguin II, MD;  Location: [facility];  Service: Urology;  Laterality: N/A;    INSERTION, SUPRAPUBIC CATHETER  7/25/2024    Procedure: INSERTION, SUPRAPUBIC CATHETER;  Surgeon: Jamaal Olguin II, MD;  Location: [facility];  Service: Urology;;    NO PAST SURGERIES         Review of patient's allergies indicates:  No Known Allergies    Current Facility-Administered Medications on File Prior to Encounter   Medication    [COMPLETED] 0.9%  NaCl infusion    [COMPLETED] cefTRIAXone (ROCEPHIN) 2 g in dextrose 5 % 100 mL IVPB (ready to mix)    [COMPLETED] lactated ringers bolus 1,000 mL    [COMPLETED] lactated ringers bolus 1,000 mL    [COMPLETED] levETIRAcetam (Keppra) 2,000 mg in D5W 250 mL IVPB    [COMPLETED] potassium chloride 40 mEq in 100 mL IVPB (FOR CENTRAL LINE ADMINISTRATION ONLY)    [DISCONTINUED] 0.9%  NaCl infusion    [DISCONTINUED] acetaminophen tablet 650 mg    [DISCONTINUED] chlorhexidine 0.12 % solution 15 mL    [DISCONTINUED] EPINEPHrine (ADRENALIN) 5 mg in D5W 250 mL infusion    [DISCONTINUED] levETIRAcetam in NaCl (iso-os) IVPB 1,000 mg    [DISCONTINUED] levETIRAcetam in NaCl (iso-os) IVPB 1,500 mg    [DISCONTINUED] LORazepam injection 2 mg    [DISCONTINUED] melatonin tablet 6 mg    [DISCONTINUED] NORepinephrine 4 mg in dextrose 5% 250 mL infusion (premix)    [DISCONTINUED] pantoprazole injection 40 mg    [DISCONTINUED] piperacillin-tazobactam 3.375 g in dextrose 5 % 100 mL IVPB (ready to mix)    [DISCONTINUED] piperacillin-tazobactam IVPB 3.375 g    [DISCONTINUED] propofol (DIPRIVAN) 10 mg/mL infusion    [DISCONTINUED] sodium chloride 0.9% flush 10 mL     Current  Outpatient Medications on File Prior to Encounter   Medication Sig    alfuzosin (UROXATRAL) 10 mg Tb24 Take 1 tablet (10 mg total) by mouth daily with breakfast.    amLODIPine (NORVASC) 10 MG tablet Take 1 tablet (10 mg total) by mouth once daily.    donepeziL (ARICEPT) 10 MG tablet TAKE ONE TABLET BY MOUTH EVERY EVENING    ferrous gluconate (FERGON) 324 MG tablet Take 324 mg by mouth every other day.    finasteride (PROSCAR) 5 mg tablet Take 1 tablet (5 mg total) by mouth once daily.    FLUoxetine 20 MG capsule TAKE ONE CAPSULE BY MOUTH EVERY MORNING    oxybutynin (DITROPAN-XL) 5 MG TR24 Take 5 mg by mouth once daily.    pravastatin (PRAVACHOL) 40 MG tablet TAKE ONE TABLET BY MOUTH EVERY NIGHT    traMADoL (ULTRAM) 50 mg tablet TAKE TWO TABLETS BY MOUTH THREE TIMES DAILY AS NEEDED FOR PAIN     Family History       Problem Relation (Age of Onset)    Heart attack Mother    Stomach cancer Brother    Stroke Father, Sister, Brother          Tobacco Use    Smoking status: Former    Smokeless tobacco: Never   Substance and Sexual Activity    Alcohol use: Not Currently     Comment: quit 2022    Drug use: No    Sexual activity: Not Currently     Review of Systems   Reason unable to perform ROS: Intubated.     Objective:     Vital Signs (Most Recent):  Temp: 96.4 °F (35.8 °C) (10/30/24 1202)  Pulse: 82 (10/30/24 1202)  Resp: 16 (10/30/24 1202)  BP: (!) 173/83 (10/30/24 1202)  SpO2: 100 % (10/30/24 1202) Vital Signs (24h Range):  Temp:  [94.3 °F (34.6 °C)-98.6 °F (37 °C)] 96.4 °F (35.8 °C)  Pulse:  [] 82  Resp:  [15-43] 16  SpO2:  [99 %-100 %] 100 %  BP: ()/() 173/83     Weight: 64.9 kg (143 lb 1.3 oz)  Body mass index is 21.13 kg/m².    SpO2: 100 %         Intake/Output Summary (Last 24 hours) at 10/30/2024 1327  Last data filed at 10/30/2024 1202  Gross per 24 hour   Intake 784.66 ml   Output 665 ml   Net 119.66 ml       Lines/Drains/Airways       Central Venous Catheter Line  Duration              Percutaneous Central Line - Triple Lumen  10/29/24 1350 Internal Jugular Right <1 day              Drain  Duration                  NG/OG Tube 10/29/24 1330 [place] sump 18 Fr. Left mouth <1 day         Suprapubic Catheter 10/30/24 1255 <1 day              Airway  Duration                  Airway - Non-Surgical 10/29/24 1240 Endotracheal Tube 1 day              Peripheral Intravenous Line  Duration                  Peripheral IV - Single Lumen 10/29/24 1248 18 G Right Antecubital 1 day         Midline Catheter - Single Lumen 10/30/24 1130 Left basilic vein (medial side of arm) 20g x 10cm <1 day                     Physical Exam  Constitutional:       General: He is not in acute distress.     Interventions: He is intubated.   HENT:      Head: Normocephalic and atraumatic.      Right Ear: External ear normal.      Left Ear: External ear normal.   Cardiovascular:      Rate and Rhythm: Normal rate and regular rhythm.      Pulses: Normal pulses.      Heart sounds: No murmur heard.     No friction rub. No gallop.   Pulmonary:      Effort: No respiratory distress. He is intubated.      Breath sounds: Normal breath sounds and air entry. No wheezing or rales.   Abdominal:      General: There is no distension.      Palpations: Abdomen is soft.   Musculoskeletal:      Right lower leg: No edema.      Left lower leg: No edema.   Skin:     General: Skin is cool and dry.      Capillary Refill: Capillary refill takes less than 2 seconds.   Neurological:      GCS: GCS eye subscore is 1. GCS verbal subscore is 1. GCS motor subscore is 3.      Cranial Nerves: Cranial nerve deficit present.      Comments: Corneal reflex negative. Unable to assess gag reflex and cough reflex. Defer pupillary light reflex to neurology.          Significant Labs: ABG:   Recent Labs   Lab 10/29/24  1235 10/30/24  0525   PH 7.163* 7.350   PCO2 35.1 34.8*   HCO3 12.3* 19.2*   POCSATURATED  --  100   BE  --  -6*   , CMP   Recent Labs   Lab 10/29/24  1301  10/30/24  0332 10/30/24  0824 10/30/24  1145   * 147* 144 146*   K 3.2* 4.1 3.8 3.6   * 116* 117* 116*   CO2 14* 17* 18* 18*   * 123* 108 113*   BUN 20 18 16 15   CREATININE 1.45 1.1 0.9 0.8   CALCIUM 10.0 9.6 9.2 8.9   PROT 6.3 6.8  --   --    ALBUMIN 3.3* 3.3*  --   --    BILITOT 0.4 0.3  --   --    ALKPHOS 62 76  --   --    * 96*  --   --    * 83*  --   --    ANIONGAP 18* 14 9 12   , CBC   Recent Labs   Lab 10/29/24  1301 10/30/24  0332   WBC 7.80 12.86*   HGB 8.4* 9.0*   HCT 27.6* 28.9*    177   , and Troponin   Recent Labs   Lab 10/30/24  0332 10/30/24  0824 10/30/24  1145   TROPONINI 1.381* 0.851* 0.725*     Lab Results   Component Value Date    LACTATE 2.2 10/30/2024    LACTATE 2.1 10/30/2024    LACTATE 4.8 (HH) 10/30/2024    LACTATE 4.9 () 10/29/2024    LACTATE 6.0 () 10/29/2024           Significant Imaging: Echocardiogram: Transthoracic echo (TTE) complete (Cupid Only):   Results for orders placed or performed during the hospital encounter of 06/29/20   Echo Color Flow Doppler? Yes   Result Value Ref Range    BSA 2 m2    TDI SEPTAL 0.09 m/s    LV LATERAL E/E' RATIO 12.25 m/s    LV SEPTAL E/E' RATIO 10.89 m/s    LA WIDTH 3.81 cm    Right Atrial Pressure (from IVC) 3 mmHg    TDI LATERAL 0.08 m/s    PV PEAK VELOCITY 1.14 cm/s    LVIDd 4.56 3.5 - 6.0 cm    IVS 0.69 0.6 - 1.1 cm    PW 0.85 0.6 - 1.1 cm    LVIDs 2.98 2.1 - 4.0 cm    FS 35 28 - 44 %    IVC proximal 1.4 cm    EF + QEF 62 %    LA Vol 46.88 cm3    Sinus 3.07 cm    STJ 1.87 cm    Ascending aorta 2.28 cm    LV mass 110.56 g    LA size 3.41 cm    RVDD 3.47 cm    TAPSE 2.69 cm    RV Basal Diameter 7.40 cm    RV S' 16 cm/s    Left Ventricle Relative Wall Thickness 0.37 cm    AV mean gradient 8 mmHg    AV valve area 2.01 cm2    AV Velocity Ratio 0.79     AV index (prosthetic) 0.67     E/A ratio 2.13     Mean e' 0.09 m/s    E wave deceleration time 274.73 msec    Pulm vein S/D ratio 1.18     LVOT diameter 1.95 cm     LVOT area 3.0 cm2    LVOT peak naresh 1.55 m/s    LVOT peak VTI 25.78 cm    Ao peak naresh 1.97 m/s    Ao VTI 38.24 cm    RVOT prox diameter 2.6 cm    RVOT area 5.31 cm2    LVOT stroke volume 76.95 cm3    AV peak gradient 16 mmHg    TV resting pulmonary artery pressure 29 mmHg    E/E' ratio 11.53 m/s    MV Peak E Naresh 0.98 m/s    TR Max Naresh 2.56 m/s    AV Comp diameter 2.6 cm    AV Comp area 5.31 cm2    MV Peak A Naresh 0.46 m/s    PV Peak S Naresh 0.78 m/s    PV Peak D Naresh 0.66 m/s    LV Systolic Volume 34.52 mL    LV Systolic Volume Index 17.1 mL/m2    LV Diastolic Volume 95.39 mL    LV Diastolic Volume Index 47.15 mL/m2    SELVIN 23.2 mL/m2    LV Mass Index 55 g/m2    RA Major Axis 3.86 cm    Left Atrium Minor Axis 4.51 cm    Left Atrium Major Axis 4.01 cm    Triscuspid Valve Regurgitation Peak Gradient 26 mmHg    SELVIN (MOD) 14.1 mL/m2    LA Vol (MOD) 28.48 cm3    RA Width 3.25 cm    Narrative    1. The left ventricle (LV) is normal in size with normal systolic   function. Normal LV geometry. False LV tendon.  2. There are no significant regional wall motion abnormalities.  3. LV ejection fraction is 55-60%. Normal LV diastolic function.  4. The right ventricle (RV) is normal in size with normal systolic   function.   5. Estimated RVSP is normal (less than 40 mmHg). Pulmonary hypertension is   absent.  6. Normal sized atria.  7. The aortic valve is poorly visualized; appears sclerocalcific -   probably mild stenosis with PV of 1.97 m/s, KATRINA 2.0 cm2, MG 8.  8. No prior studies available for comparison.   , EKG: SR with Bifascicular block, and X-Ray: CXR: X-Ray Chest 1 View (CXR):   Results for orders placed or performed during the hospital encounter of 10/30/24   X-Ray Chest 1 View    Narrative    EXAMINATION:  XR CHEST 1 VIEW    CLINICAL HISTORY:  intubation;    TECHNIQUE:  Single frontal view of the chest was performed.    COMPARISON:  N 10/29/2024 one    FINDINGS:  Support devices remain.  Heart size normal.  Small  ill-defined opacities identified at the lung bases more on the left side..  Otherwise the lungs are clear.  No pleural effusion.      Impression    See above      Electronically signed by: Mauricio Couch MD  Date:    10/30/2024  Time:    08:44     Assessment and Plan:     * Cardiac arrest  74-year-old man with a witnessed out-of-hospital arrest with with no known history of CAD or structural heart disease.  Found to have new bifascicular block on EKG.  Now undergoing therapeutic hypothermia.  Echo is essentially normal.    Recommendations:  - monitor telemetry and if patient goes into a first-degree AV block contact Cardiology and we will insert a TVP  - if extubated and neurologically intact, re-consult us for ischemic workup    Elevated troponin  Elevated troponin in the setting of out-of-hospital arrest.  Troponin peaked at 1.381 now downtrending.  Increased troponin likely caused by chest compressions and subsequent cardiac contusion.        VTE Risk Mitigation (From admission, onward)           Ordered     heparin (porcine) injection 5,000 Units  Every 8 hours         10/30/24 0256     IP VTE HIGH RISK PATIENT  Once         10/30/24 0256     Place sequential compression device  Until discontinued         10/30/24 0256                    Thank you for your consult. I will sign off. Please contact us if you have any additional questions.    Raheem Templeton MD  Cardiology   [facility]

## 2024-10-31 PROBLEM — G93.1: Status: ACTIVE | Noted: 2024-01-01

## 2024-10-31 PROBLEM — Z71.89 ACP (ADVANCE CARE PLANNING): Status: ACTIVE | Noted: 2024-01-01

## 2024-10-31 PROBLEM — G40.901 STATUS EPILEPTICUS: Status: ACTIVE | Noted: 2024-01-01

## 2024-10-31 PROBLEM — H40.9 GLAUCOMA: Status: ACTIVE | Noted: 2024-01-01

## 2024-10-31 PROBLEM — G91.1 OBSTRUCTIVE HYDROCEPHALUS: Status: ACTIVE | Noted: 2024-01-01

## 2024-10-31 PROBLEM — D50.9 MICROCYTIC ANEMIA: Status: ACTIVE | Noted: 2024-01-01

## 2024-10-31 PROBLEM — R74.01 TRANSAMINITIS: Status: ACTIVE | Noted: 2024-01-01

## 2024-10-31 NOTE — ASSESSMENT & PLAN NOTE
Recent Labs     10/31/24  0514 10/31/24  0850 10/31/24  1319   BILITOT 0.2 0.2 0.3   ALKPHOS 84 81 89   * 142* 144*   ALT 64* 62* 64*     Stable. Continue to monitor with daily CMPs.

## 2024-10-31 NOTE — ASSESSMENT & PLAN NOTE
"- Start Cosopt drops per Ophtho recs  - Optho recommending "comfort care" of left ey," please see note dated 10/31 for more details   "

## 2024-10-31 NOTE — HOSPITAL COURSE
10/31/2024: Rewarming started at 4AM, achieved target temp at 7:58AM. EEG with diffusely suppressed background. MRI consistent with global anoxic injury and obstructive hydrocephalus. Made DNR after conversation with brother and sister at bedside (see ACP note.)  11/01/2024: Overnight patient required both cooling and Jessica hugger to maintain normothermia. Fent started due to tachypnea, tachycardia, vent dyssynchrony. Planning for family meeting Sunday.  11/02/2024: SASHA. CTH ordered for overnight followed by family meeting tomorrow.   11/3: family meeting today for GOC and prognostication  11/4/2024 continue comfort care, d/c to inpatient hospice

## 2024-10-31 NOTE — NURSING
1652 Kathie CALI called and notified of increasing tachypnea, tachycardia in 135, and vent dyssynchrony. New order for fentanyl gtt. WCTM

## 2024-10-31 NOTE — PROCEDURES
ICU EEG/VIDEO MONITORING REPORT    DATE OF SERVICE: 10/31/24  EEG NUMBER: [id number]  [facility]    METHODOLOGY   Electroencephalographic (EEG) recording is with electrodes placed according to the International 10-20 placement system.  Thirty two (32) channels of digital signal are simultaneously recorded from the scalp and may include EKG, EMG, and/or eye monitors.   Recording band pass was 0.1 to 512 hz.  Digital video recording of the patient is simultaneously recorded with the EEG.  The nursing staff report clinical symptoms and may press an event button when the patient has symptoms of clinical interest to the treating physicians.  EEG and video recording is stored and archived in digital format.  The entire recording is visually reviewed and the times identified by computer analysis as being spikes or seizures are reviewed again.  Activation procedures which include photic stimulation, hyperventilation and instructing patients to perform simple task are done in selected patients.   Compresses spectral analysis (CSA) is also performed on the activity recorded from each individual channel.  This is displayed as a power display of frequencies from 0 to 30 Hz over time.   The CSA analysis is done and displayed continuously.  This is reviewed for asymmetries in power between homologous areas of the scalp and for presence of changes in power which canbe seen when seizures occur.  Sections of suspected abnormalities on the CSA is then compared with the original EEG recording.     [employer] software was also utilized in the review of this study.  This software suite analyzes the EEG recording in multiple domains.  Coherence and rhythmicity is computed to identify EEG sections which may contain organized seizures.  Each channel undergoes analysis to detect presence of spike and sharp waves which have special and morphological characteristic of epileptic activity.  The routine EEG recording is  converted from spacial into frequency domain.  This is then displayed comparing homologous areas to identify areas of significant asymmetry.  Algorithm to identify non-cortically generated artifact is used to separate eye movement, EMG and other artifact from the EEG.      Recording Times  Start on 10/30/24 at 10:59  Stop on 10/31/24 at 07:00 - 19 hours 58 minutes  Start 10/31/24 at 07:00 for 4 hours    This EEG study is performed in the ICU with the patient on the following sedative medications: propofol 50 -> 25 -> none    Indication: 74 y.o. M w/ a PMH significant for HTN, HLD, vascular dementia, and schizophrenia who is non-verbal at baseline. Per medical records, the patient was in his usual state of health on 10/28 when he had a witnessed out-of-hospital arrest. EEG to evaluate neurological status and rule out seizures.    State of Consciousness:   Coma    Background:   The background is diffusely suppressed with no definitive cortical activity.  There is occasional intermittent low amplitude theta activity seen anteriorly which is suspected to be ventilator artifact.     Sleep:   The patient is in a comatose state throughout the record, with no normal sleep architecture appreciated    Epileptiform Abnormalities  None    EKG:   Sinus rhythm    Seizures/Events:   None    Impression:   Abnormal study due to a diffusely suppressed background consistent with a severe degree of cerebral dysfunction.  There is no definitive cortical activity noted, highly concerning for anoxic brain injury.      Rosemary Coppola MD  [facility]   Department of Neurology/Epilepsy

## 2024-10-31 NOTE — ASSESSMENT & PLAN NOTE
"10/31/2024 MRI - "Marked effacement about the cerebellar vermis and hemispheres with effacement of the 4th ventricle, slight upward cerebellar tonsillar herniation, and crowding of the cerebellar tonsils at the foramen magnum. There is resultant upstream obstructive hydrocephalus involving the lateral and 3rd ventricles with probable transependymal flow of CSF."    - NSGY consulted  - Goal Na > 150; sodium q6h, 250 cc 3% hypertonic saline as needed  "

## 2024-10-31 NOTE — CONSULTS
Patient chart and imaging reviewed in great detail. Patient has strong component of chronic hydrocephalus contributing to current picture, as well as very poor clinical outlook.    Based on this assessment, no surgical intervention to be offered at this time. Risks strongly outweigh potential benefits of intervention.    Please reach out with any questions regarding care of this patient.    Discussed with staff Dr. Rigo Patterson MD  Neurosurgery  [facility]

## 2024-10-31 NOTE — ASSESSMENT & PLAN NOTE
UA positive for nitrites, leukocyte esterase, WBC, and bacteria. Repeat U/A after catheter exchange still consistent with UTI.     - Continue meropenem 1 g IV q8h  - F/u urine culture

## 2024-10-31 NOTE — ASSESSMENT & PLAN NOTE
"Recent Labs     10/30/24  0824 10/30/24  1145 10/30/24  2039   TROPONINI 0.851* 0.725* 0.473*     Downtrending. Per Cardiology - "Increased troponin likely caused by chest compressions and subsequent cardiac contusion."  "

## 2024-10-31 NOTE — ASSESSMENT & PLAN NOTE
"10/31/2024 MRI - "Focal cortically based diffusion restriction about the right frontal lobe, left greater than right occipital lobes, and involving the cerebellar hemisphere with superimposed mild diffuse symmetric cortically based diffusion signal abnormality throughout both cerebral hemispheres and to a lesser extent involving the basal ganglia. Findings remain concerning for areas of evolving acute/subacute infarct with possible superimposed global anoxic injury, noting that additional radiology such as sequela of seizure-like activity is also considered. Sequela of infectious or non infectious inflammatory cerebritis is also considered but felt less likely."    EEG with diffuse background suppression.    See "Cardiac arrest" for management.   "

## 2024-10-31 NOTE — CONSULTS
"Consultation Report  Ophthalmology Service    Date: 10/31/2024    Reason for Consult: "increased density left globe on CTH concerning for hemorrhage"     History of Present Illness: Irvin Nuñez Jr. is a 74 y.o. male with PMH of vascular dementia (nonverbal at baseline), schizophrenia, chronic poor vision in the left eye, who is admitted to the ICU following cardiac arrest and seizure-like activity post cardiac arrest. Pt received 6 rounds of epi and CPR Ophthalmology was consulted for new finding of increased density left globe on CTH concerning for hemorrhage on CT head imaging. Pt intubated and sedated. Obtained collateral through pt's sister. Pt has not been able to see out of the left eye for years. She reports he has vision in the right eye but unable to determine acuity of vision. Pt does not see an outside ophthalmologist.    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 family history includes Heart attack in his mother; Stomach cancer in his brother; Stroke in his brother, father, and sister.     PMHx:  has a past medical history of High cholesterol, Hypertension, Incontinence of urine, Schizophrenia, and Vascular dementia.     PSurgHx:  has a past surgical history that includes No past surgeries; Cystoscopic litholapaxy (N/A, 7/25/2024); insertion, suprapubic catheter (7/25/2024); and Flexible cystoscopy (N/A, 7/25/2024).     Home Medications:   Prior to Admission medications    Medication Sig Start Date End Date Taking? Authorizing Provider   alfuzosin (UROXATRAL) 10 mg Tb24 Take 1 tablet (10 mg total) by mouth daily with breakfast. 10/11/24   Alberto Lundberg Jr., MD   amLODIPine (NORVASC) 10 MG tablet Take 1 " tablet (10 mg total) by mouth once daily. 1/24/24 1/23/25  Belem Girard MD   donepeziL (ARICEPT) 10 MG tablet TAKE ONE TABLET BY MOUTH EVERY EVENING 6/19/24   Alberto Lundberg Jr., MD   ferrous gluconate (FERGON) 324 MG tablet Take 324 mg by mouth every other day.    Provider, Historical   finasteride (PROSCAR) 5 mg tablet Take 1 tablet (5 mg total) by mouth once daily. 10/11/24 10/11/25  Alberto Lundberg Jr., MD   FLUoxetine 20 MG capsule TAKE ONE CAPSULE BY MOUTH EVERY MORNING 4/26/24   Alberto Lundberg Jr., MD   oxybutynin (DITROPAN-XL) 5 MG TR24 Take 5 mg by mouth once daily. 10/14/24   Provider, Historical   pravastatin (PRAVACHOL) 40 MG tablet TAKE ONE TABLET BY MOUTH EVERY NIGHT 6/19/24   Alberto Lundberg Jr., MD   traMADoL (ULTRAM) 50 mg tablet TAKE TWO TABLETS BY MOUTH THREE TIMES DAILY AS NEEDED FOR PAIN 10/11/24   Alberto Lundberg Jr., MD        Medications this encounter:    amLODIPine  10 mg Per OG tube Daily    enoxparin  40 mg Subcutaneous Daily    famotidine  20 mg Per OG tube BID    FLUoxetine  20 mg Per OG tube Daily    meropenem IV (PEDS and ADULTS)  1 g Intravenous Q8H    polyethylene glycol  17 g Per OG tube BID    pravastatin  40 mg Per OG tube Daily    senna-docusate 8.6-50 mg  1 tablet Per OG tube BID       Allergies: has No Known Allergies.     Social:  reports that he has quit smoking. He has never used smokeless tobacco. He reports that he does not currently use alcohol. He reports that he does not use drugs.     ROS: As per HPI    Ocular examination/Dilated fundus examination:  Base Eye Exam       Visual Acuity (Snellen - Linear)         Right Left    Dist sc SG SG              Tonometry (Tonopen, 11:08 AM)         Right Left    Pressure 17 42              Pupils         Dark Light Shape React APD    Right 2.5 2 Round Minimal sg    Left 5 5 Irregular nonreactive None              Visual Fields    sg             Extraocular Movement    sg             Neuro/Psych       Oriented  x3: Yes    Mood/Affect: Normal              Dilation       Both eyes: 1% Mydriacyl, 2.5% Phenylephrine @ 8:59 AM                  Slit Lamp and Fundus Exam       External Exam         Right Left    External intubated intubated              Slit Lamp Exam         Right Left    Lids/Lashes dermatochalsis dermatochalsis    Conjunctiva/Sclera white and quiet slighlty prominent episcleral vessels    Cornea Clear Dense corneal opacification inferior half of k inolving central visual axis with corneal neovascularization    Anterior Chamber Deep and quiet appears deep and quiet but hazy view    Iris Round and reactive nonreactive, irregular, ovoid shape with apex at 7 o'clock    Lens Clear white cataract    Anterior Vitreous Normal no view              Fundus Exam         Right Left    Disc pink and sharp no view    C/D Ratio 0.6-0.7 no view    Macula poor foveal light reflex, no lesions no view    Vessels significant arteriolar attenuation, mild tortuosity, AV nicking no view    Periphery poor view given pt unable to cooperate with exam 2/2 intubated/sedated status, retina appears flat in areas visualized without heme, holes, tears or detachments no view                  Bscan OS: chronic appearing funnel retinal detachment, subretinal fluid cyst vs sub retinal heme, no intraocular masses    Assessment/Plan:     # Chronic funnel Retinal Detachment Left eye  # Proliferative Vitreoretinopathy Left eye    # Corneal Scar with corneal neovascularization Left eye   # Mature White cataract Left eye   # Increased Intraocular pressure Left eye   - ophthalmology consulted for new dependent density seen in left globe when compared to previous MRI brain 9/2023  - no view posteriorly given dense white cataract, IOP 42   - pt intubated and sedated   - Bscan significant for chronic appearing funnel retinal detachment with retinal cysts indicative of proliferative vitreoretinopathy   - Pt is a poor candidate for surgical intervention  given current health status, multiple commodities, and chronic low vision in left eye along with exam findings, suggests this is chronic pathology with 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  # Combined form of age related cataract right eye   # Hypertensive retinopathy right eye Grade 2   - sister denies pt has history of glaucoma but does report pt's grandmother was blind later in life from unknown etiology  - ONH with intact and pink neuroretinal rim 360, 0.6- 0.7 cup to disc ratio based off contour of cup vertically, however, needs slit lamp examination with 90 diopter lens for better visualization and more accurate C/D ratio      Recommendations:  - start cosopt BID OU   - start lacrilube ointment to both eyes QID both eyes for exposure keratopathy prevention    - please page ophthalmology if any new or concerning ocular issues arise      Raheem Monge MD   [employer] Ophthalmology PGY2  10/31/2024  8:51 AM        Common Ophthalmologic Abbreviations  OD: right eye  OS: left eye  OU: both eyes  IOP: intraocular pressure  VA: visual acuity  PH: pinhole  HM: hand motion  LP: light perception  NLP: no light perception  DFE: dilated fundus examination  SLE: slit lamp examination  RD: retinal detachment   AT: artificial tears  PFAT: preservative free artificial tears

## 2024-10-31 NOTE — ASSESSMENT & PLAN NOTE
Respiratory failure which is Acute.  he is not on home oxygen. Supplemental oxygen was provided and noted- Vent Mode: A/C  Oxygen Concentration (%):  [40] 40  Resp Rate Total:  [16 br/min-28 br/min] 20 br/min  Vt Set:  [450 mL] 450 mL  PEEP/CPAP:  [5 cmH20] 5 cmH20  Mean Airway Pressure:  [5.4 xjD91-68 cmH20] 11 cmH20    .   Signs/symptoms of respiratory failure include- tachypnea and respiratory distress. Contributing diagnoses includes -  cardiac arrest  Labs and images were reviewed. Patient Has recent ABG, which has been reviewed. Will treat underlying causes and adjust management of respiratory failure as follows-     Recent Labs     10/30/24  0525 10/31/24  0452   PH 7.350 7.438   PCO2 34.8* 31.9*   PO2 182* 165*   HCO3 19.2* 21.6*   POCSATURATED 100 100   BE -6* -3*

## 2024-10-31 NOTE — ASSESSMENT & PLAN NOTE
Anemia is likely due to  unknown . Most recent hemoglobin and hematocrit are listed below.  Recent Labs     10/29/24  1301 10/30/24  0332 10/31/24  0005 10/31/24  0452   HGB 8.4* 9.0* 8.5*  --    HCT 27.6* 28.9* 27.3* 28*     Plan  - Monitor serial CBC: Daily  - Transfuse PRBC if patient becomes hemodynamically unstable, symptomatic or H/H drops below 7/21.  - Patient has not received any PRBC transfusions to date  - Patient's anemia is currently stable

## 2024-10-31 NOTE — NURSING
1430 Kathie CALI called and notified of HR sustained 137. /65 and temp 100. Verbal order to given 50mg fentanyl, 12 lead ECG, and tylenol.

## 2024-10-31 NOTE — NURSING
Kathie CALI called and notifed of most recent 12 lead EKG. Unofficial read stating new anteroseptal infarct is now present. Most recent lytes K-3.2, Mg 1.7, Phos 2.7. NCC team aware, no new orders at this time WCTM.

## 2024-10-31 NOTE — NURSING
Pt arrived back to room following MRI        Pt was escorted by RN and RT on cardiac monitoring, O2, ambu bag, portable vent, and IV pole.        Patient placed back on bedside monitor with alarms audible, bed in low position with bed alarm on, call light within reach. MARIMAR.

## 2024-10-31 NOTE — SUBJECTIVE & OBJECTIVE
Interval History:  See hospital course    Review of Systems   Unable to perform ROS: Intubated     Objective:     Vitals:  Temp: (!) 100.9 °F (38.3 °C)  Pulse: (!) 129  Rhythm: sinus tachycardia  BP: (!) 116/54  MAP (mmHg): 78  Resp: 17  SpO2: 99 %  Oxygen Concentration (%): 40  Vent Mode: A/C  Set Rate: 16 BPM  Vt Set: 450 mL  PEEP/CPAP: 5 cmH20  Peak Airway Pressure: 18 cmH20  Mean Airway Pressure: 11 cmH20  Plateau Pressure: 0 cmH20    Temp  Min: 94.3 °F (34.6 °C)  Max: 100.9 °F (38.3 °C)  Pulse  Min: 71  Max: 140  BP  Min: 115/56  Max: 196/87  MAP (mmHg)  Min: 78  Max: 138  Resp  Min: 12  Max: 25  SpO2  Min: 84 %  Max: 100 %  Oxygen Concentration (%)  Min: 40  Max: 40    10/30 0701 - 10/31 0700  In: 2460.1 [I.V.:1651.9]  Out: 1380 [Urine:1380]            Physical Exam  Vitals and nursing note reviewed.   Constitutional:       Appearance: He is ill-appearing.   HENT:      Head: Normocephalic and atraumatic.   Cardiovascular:      Rate and Rhythm: Regular rhythm. Tachycardia present.   Pulmonary:      Comments: Mechanical breath sounds  Musculoskeletal:      Right lower leg: No edema.      Left lower leg: No edema.   Skin:     General: Skin is warm and dry.   Neurological:      Mental Status: He is unresponsive.      GCS: GCS eye subscore is 1. GCS verbal subscore is 1. GCS motor subscore is 3.      Comments: - Subtle R corneal intact  - RLE triple flex to pain; other extremities do not react            Medications:  Continuouspropofol, Last Rate: Stopped (10/31/24 1250)    ScheduledamLODIPine, 10 mg, Daily  dorzolamide, 1 drop, BID  enoxparin, 40 mg, Daily  famotidine, 20 mg, BID  FLUoxetine, 20 mg, Daily  meropenem IV (PEDS and ADULTS), 1 g, Q8H  polyethylene glycol, 17 g, BID  pravastatin, 40 mg, Daily  senna-docusate 8.6-50 mg, 1 tablet, BID  timolol maleate 0.5%, 1 drop, BID  white petrolatum-mineral oiL, , QID    PRNacetaminophen, 650 mg, Q6H PRN  bisacodyL, 10 mg, Daily PRN  fentaNYL, 50 mcg, Q1H  PRN  hydrALAZINE, 10 mg, Q4H PRN  magnesium oxide, 800 mg, PRN  magnesium oxide, 800 mg, PRN  potassium bicarbonate, 35 mEq, PRN  potassium bicarbonate, 50 mEq, PRN  potassium bicarbonate, 60 mEq, PRN  potassium, sodium phosphates, 2 packet, PRN  potassium, sodium phosphates, 2 packet, PRN  potassium, sodium phosphates, 2 packet, PRN  sodium chloride 0.9%, 10 mL, PRN  sodium chloride 3% HYPERTONIC, 250 mL, Q6H PRN      Today I personally reviewed pertinent medications, lines/drains/airways, imaging, cardiology results, laboratory results, microbiology results,     Diet  Diet NPO

## 2024-10-31 NOTE — ASSESSMENT & PLAN NOTE
Patient initially exhibited head and eye twitching at outside hospital post-cardiac arrest. CT head negative for acute intracranial processes. Transferred to [facility] for further care. EEG without evidence of seizure thus far.    - EEG break, consider re-hooking tomorrow

## 2024-10-31 NOTE — PROCEDURES
"Arterial Line    Date/Time: 10/31/2024 12:04 PM  Location procedure was performed: [facility] NEURO [facility]IT[facility]    Performed by: Hernan Rock MD  Authorized by: Hernan Rock MD  Assisting provider: Julee Bunn PA-C  Consent Done: Yes  Consent: Written consent obtained.  Risks and benefits: risks, benefits and alternatives were discussed  Consent given by: sibling  Patient understanding: patient states understanding of the procedure being performed  Patient consent: the patient's understanding of the procedure matches consent given  Procedure consent: procedure consent matches procedure scheduled  Relevant documents: relevant documents present and verified  Test results: test results available and properly labeled  Required items: required blood products, implants, devices, and special equipment available  Patient identity confirmed: , name and MRN  Time out: Immediately prior to procedure a "time out" was called to verify the correct patient, procedure, equipment, support staff and site/side marked as required.  Preparation: Patient was prepped and draped in the usual sterile fashion.  Indications: multiple ABGs and hemodynamic monitoring  Location: right radial    Patient sedated: yes  Sedatives: propofol  Du's test normal: yes  Needle gauge: 18  Seldinger technique: Seldinger technique used  Number of attempts: 2  Complications: No  Estimated blood loss (mL): 2  Post-procedure: dressing applied  Patient tolerance: Patient tolerated the procedure well with no immediate complications          "

## 2024-10-31 NOTE — CONSULTS
[facility]  Infectious Disease  Consult Note    Patient Name: Irvin Nuñez Jr.  MRN: [id number]  Admission Date: 10/30/2024  Hospital Length of Stay: 1 days  Attending Physician: Rajan Romero MD  Primary Care Provider: Alberto Lundberg Jr., MD     Isolation Status: No active isolations        Inpatient consult to Infectious Diseases  Consult performed by: Peggy Casper MD  Consult ordered by: Kathie Garvey MD          Patient not seen. Case discussed with NCC staff. Suprapubic catheter associated UTI not improving with initial therapy. PharmD service recommeded meropenem while awaiting culture results. Agree with empiric meropenem therapy. Can de-escalate as able with culture results. Would treat for 5 days.     Thank you for your consult. I will sign off. Please contact us if you have any additional questions.    Peggy Morris MD  Infectious Disease  [facility]

## 2024-10-31 NOTE — PLAN OF CARE
[facility] Care Plan  POC reviewed with Irvin Nuñez Jr. family at 1800. Family verbalized understanding. Questions and concerns addressed. Will continue to monitor. See below and flowsheets for full assessment and VS info.     - Fentanyl gtt initiated for pain/vent dyssynchrony   - MRI complete  - EEG Dc'd  - TF @ goal (Pivot 1.5 @ 30ml/hr)  - Trending Na Q6, Goal >150, utilizing PRN 250ml 3% boluses.  - Artic sun Dc'd per [facility] team   - TMAX 100.9, PRN tylenol given and cooling blanket in place.  - Mg, phos, and potassium replaced   - Bath and linen change compelte   - PRN hydralazine given x1 to maintain SBP goal <180  - Code status changed to DNR today     Is this a stroke patient? no    Neuro:  Laurens Coma Scale  Best Eye Response: 1-->(E1) none  Best Motor Response: 3-->(M3) flexion to pain  Best Verbal Response: 1-->(V1) none  Mary Coma Scale Score: 5  Assessment Qualifiers: patient intubated  Pupil PERRLA: no     24 hr Temp:  [94.3 °F (34.6 °C)-100.9 °F (38.3 °C)]     CV:   Rhythm: sinus tachycardia  BP goals:   SBP < 180  MAP > 65    Resp:      Vent Mode: A/C  Set Rate: 16 BPM  Oxygen Concentration (%): 40  Vt Set: 450 mL  PEEP/CPAP: 5 cmH20    Plan:  Pending family meeting     GI/:     Diet/Nutrition Received: NPO, tube feeding  Last Bowel Movement: 10/28/24  Voiding Characteristics: suprapubic catheter    Intake/Output Summary (Last 24 hours) at 10/31/2024 1735  Last data filed at 10/31/2024 1726  Gross per 24 hour   Intake 3290.45 ml   Output 1415 ml   Net 1875.45 ml          Labs/Accuchecks:  Recent Labs   Lab 10/31/24  0005 10/31/24  0452   WBC 8.74  --    RBC 3.96*  --    HGB 8.5*  --    HCT 27.3* 28*   *  --       Recent Labs   Lab 10/31/24  1319      K 3.4*   CO2 21*   *   BUN 11   CREATININE 0.8   ALKPHOS 89   ALT 64*   *   BILITOT 0.3      Recent Labs   Lab 10/30/24  0332 10/31/24  0005   INR 1.1 1.1   APTT 29.3  --       Recent Labs   Lab 10/30/24  2039 10/31/24  0850    CPK 2724* 2759*   TROPONINI 0.473*  --        Electrolytes: Electrolytes replaced  Accuchecks: none    Gtts:   fentanyl  0-150 mcg/hr Intravenous Continuous 5 mL/hr at 10/31/24 1726 50 mcg/hr at 10/31/24 1726    propofol  25 mcg/kg/min Intravenous Continuous   Stopped at 10/31/24 1250       LDA/Wounds:    Hesham Risk Assessment  Sensory Perception: 1-->completely limited  Moisture: 3-->occasionally moist  Activity: 1-->bedfast  Mobility: 1-->completely immobile  Nutrition: 2-->probably inadequate  Friction and Shear: 2-->potential problem  Hesham Score: 10    Is your hesham score 12 or less? yes heel boots on      Nurses Note -- 4 Eyes    Is there altered skin present?  yes     Please check the following boxes that apply:   [x] LDA Added if Not in Epic (Describe Wound)   [x] New Altered Skin Integrity was Present on Admit and Documented in LDA   [x] Wound Image Taken    Wound Care Consulted? Yes     Second RN/Staff Member:  LINDA Mccormick

## 2024-10-31 NOTE — PLAN OF CARE
[facility] Care Plan    POC reviewed with Irvin Nuñez Jr. and family at 0300. Patient unable to verbalize understanding. No acute events today. Pt progressing toward goals. Will continue to monitor. See below and flowsheets for full assessment and VS info.     No acute changes overnight.  Rewarming phase began @ 0408 to set 37C   MRI pending until rewarming phase complete  No clinical seizure activity noted  PRN fentanyl administered x 2 for shivering/vent dyssynchrony  One time 100 mcg fentanyl push administered for tachycardia and hypertension  PRN hydralazine administered x 1 to maintain SBP < 180  NS @ 75 mL/hr   Propofol @ 25 mcg/kg/min   UO /hr               Is this a stroke patient? no    Neuro:  Godley Coma Scale  Best Eye Response: 2-->(E2) to pain  Best Motor Response: 2-->(M2) extension to pain  Best Verbal Response: 1-->(V1) none  Mary Coma Scale Score: 5  Assessment Qualifiers: patient intubated, patient chemically sedated or paralyzed  Pupil PERRLA: other (see comments) (SG: pupils dilated by ophthalmology)     24 hr Temp:  [94.3 °F (34.6 °C)-98.1 °F (36.7 °C)]     CV:   Rhythm: normal sinus rhythm  BP goals:   SBP < 180  MAP > 65    Resp:      Vent Mode: A/C  Set Rate: 16 BPM  Oxygen Concentration (%): 40  Vt Set: 450 mL  PEEP/CPAP: 5 cmH20    Plan:  Pending     GI/:     Diet/Nutrition Received: NPO  Last Bowel Movement: 10/28/24  Voiding Characteristics: suprapubic catheter    Intake/Output Summary (Last 24 hours) at 10/31/2024 0323  Last data filed at 10/31/2024 0319  Gross per 24 hour   Intake 2639.68 ml   Output 1585 ml   Net 1054.68 ml          Labs/Accuchecks:  Recent Labs   Lab 10/31/24  0005   WBC 8.74   RBC 3.96*   HGB 8.5*   HCT 27.3*   *      Recent Labs   Lab 10/31/24  0005      K 3.3*   CO2 18*   *   BUN 11   CREATININE 0.6   ALKPHOS 79   ALT 68*   *   BILITOT 0.3      Recent Labs   Lab 10/30/24  0332 10/31/24  0005   INR 1.1 1.1   APTT 29.3  --        Recent Labs   Lab 10/30/24  2039   CPK 2724*   TROPONINI 0.473*       Electrolytes: Contraindicated  Accuchecks: none    Gtts:   0.9% NaCl   Intravenous Continuous 75 mL/hr at 10/31/24 0319 Rate Verify at 10/31/24 0319    propofol  25 mcg/kg/min Intravenous Continuous 9.7 mL/hr at 10/31/24 0319 25 mcg/kg/min at 10/31/24 0319       LDA/Wounds:    Hesham Risk Assessment  Sensory Perception: 1-->completely limited  Moisture: 3-->occasionally moist  Activity: 1-->bedfast  Mobility: 1-->completely immobile  Nutrition: 2-->probably inadequate  Friction and Shear: 2-->potential problem  Hesham Score: 10  Is your hesham score 12 or less? yes heel boots on    Nurses Note -- 4 Eyes    Is there altered skin present?  yes     Please check the following boxes that apply:   [] LDA Added if Not in Epic (Describe Wound)   [] New Altered Skin Integrity was Present on Admit and Documented in LDA   [] Wound Image Taken    Wound Care Consulted? Yes     Second RN/Staff Member:  Christa MCKEON      Restraints:        [facility]

## 2024-10-31 NOTE — ASSESSMENT & PLAN NOTE
Multiple penile ulcerations on glans. S/p IM Penicillin x 1 on 10/30 (Treponema negative but ID curbside recommended treating as it is possible patient had not yet seroconverted.)     - F/u penile swab culture

## 2024-10-31 NOTE — ASSESSMENT & PLAN NOTE
Patient experienced out-of-hospital cardiac arrest. CPR initiated by family. ROSC after 6 rounds of epi. No documentation of rhythm or shocks delivered. Echo 10/29 EF 67%, no wall motion abnormalities, no severe valvular disease. EKG 10/29 NSR with LAD and new RBBB    - TTM - achieved goal temp 7:58 AM on Thursday 10/31/2024  - F/u NSE  - Patient is now a DNR  - Lactic acidosis downtrended  - F/u repeat TTE

## 2024-10-31 NOTE — NURSING
Patient with SBP in 190s, HR climbing into 120s, breath stacking on ventilator. PRN 50 mcg dose of fentanyl administered, PRN hydralazine administered with minimal relief. RENUKA Rubi notified. One time dose 100 mcg of fentanyl ordered and administered @ 2107. HR now 108, /68. Patient synchronous with ventilator.

## 2024-10-31 NOTE — ASSESSMENT & PLAN NOTE
Patient on alfuzosin, finasteride, and oxybutynin at home; holding in setting of acute illness with chronic suprapubic catheter in place. Outside catheter changed out on 10/30.

## 2024-10-31 NOTE — PROGRESS NOTES
[facility] - Neuro Critical Care  Neurocritical Care  Progress Note    Admit Date: 10/30/2024  Service Date: 10/31/2024  Length of Stay: 1    Subjective:     Chief Complaint: Cardiac arrest    History of Present Illness: Irvin Nuñez is a 73 yo male with PMHx HTN, HLD, schizophrenia, and vascular dementia who was transferred from OSH ED [place] [facility] for seizure-like activity post cardiac arrest. Per family at OSH, patient has been having syncopal episodes for the past 4 months which have become much more frequent in the past 1-2 months. Today patient had another syncopal episode and collapsed at home. CPR was initiated by family and received 6 rounds of epi while being transported via [place]. Patient was intubated and ROSC achieved en route. CT head neg for acute intracranial processes. Echo showed EF 67%. EKG NSR. Lactic acid and trop elevated. UA positive for nitrites, leukocyte esterase, WBC, and bacteria. K 3.2. ABG with metabolic acidosis with partial respiratory compensation. While at OSH ED, patient had intermittent twitching of the head and eyes concerning for possible seizure like activity. He was kept on prop 30 for suppression of any possible seizure activity. He was [place] [facility] [place] further workup and higher level of care.     Hospital Course: 10/31/2024: Rewarming started at 4AM, achieved target temp at 7:58AM. EEG with diffusely suppressed background. MRI consistent with global anoxic injury and obstructive hydrocephalus. Made DNR after conversation with brother and sister at bedside (see ACP note.)    Interval History:  See hospital course    Review of Systems   Unable to perform ROS: Intubated     Objective:     Vitals:  Temp: (!) 100.9 °F (38.3 °C)  Pulse: (!) 129  Rhythm: sinus tachycardia  BP: (!) 116/54  MAP (mmHg): 78  Resp: 17  SpO2: 99 %  Oxygen Concentration (%): 40  Vent Mode: A/C  Set Rate: 16 BPM  Vt Set: 450 mL  PEEP/CPAP: 5 cmH20  Peak Airway Pressure: 18 cmH20  Mean Airway Pressure: 11  cmH20  Plateau Pressure: 0 cmH20    Temp  Min: 94.3 °F (34.6 °C)  Max: 100.9 °F (38.3 °C)  Pulse  Min: 71  Max: 140  BP  Min: 115/56  Max: 196/87  MAP (mmHg)  Min: 78  Max: 138  Resp  Min: 12  Max: 25  SpO2  Min: 84 %  Max: 100 %  Oxygen Concentration (%)  Min: 40  Max: 40    10/30 0701 - 10/31 0700  In: 2460.1 [I.V.:1651.9]  Out: 1380 [Urine:1380]            Physical Exam  Vitals and nursing note reviewed.   Constitutional:       Appearance: He is ill-appearing.   HENT:      Head: Normocephalic and atraumatic.   Cardiovascular:      Rate and Rhythm: Regular rhythm. Tachycardia present.   Pulmonary:      Comments: Mechanical breath sounds  Musculoskeletal:      Right lower leg: No edema.      Left lower leg: No edema.   Skin:     General: Skin is warm and dry.   Neurological:      Mental Status: He is unresponsive.      GCS: GCS eye subscore is 1. GCS verbal subscore is 1. GCS motor subscore is 3.      Comments: - Subtle R corneal intact  - RLE triple flex to pain; other extremities do not react            Medications:  Continuouspropofol, Last Rate: Stopped (10/31/24 1250)    ScheduledamLODIPine, 10 mg, Daily  dorzolamide, 1 drop, BID  enoxparin, 40 mg, Daily  famotidine, 20 mg, BID  FLUoxetine, 20 mg, Daily  meropenem IV (PEDS and ADULTS), 1 g, Q8H  polyethylene glycol, 17 g, BID  pravastatin, 40 mg, Daily  senna-docusate 8.6-50 mg, 1 tablet, BID  timolol maleate 0.5%, 1 drop, BID  white petrolatum-mineral oiL, , QID    PRNacetaminophen, 650 mg, Q6H PRN  bisacodyL, 10 mg, Daily PRN  fentaNYL, 50 mcg, Q1H PRN  hydrALAZINE, 10 mg, Q4H PRN  magnesium oxide, 800 mg, PRN  magnesium oxide, 800 mg, PRN  potassium bicarbonate, 35 mEq, PRN  potassium bicarbonate, 50 mEq, PRN  potassium bicarbonate, 60 mEq, PRN  potassium, sodium phosphates, 2 packet, PRN  potassium, sodium phosphates, 2 packet, PRN  potassium, sodium phosphates, 2 packet, PRN  sodium chloride 0.9%, 10 mL, PRN  sodium chloride 3% HYPERTONIC, 250 mL, Q6H  "PRN      Today I personally reviewed pertinent medications, lines/drains/airways, imaging, cardiology results, laboratory results, microbiology results,     Diet  Diet NPO    Assessment/Plan:     Neuro  Brain hypoxic injury  10/31/2024 MRI - "Focal cortically based diffusion restriction about the right frontal lobe, left greater than right occipital lobes, and involving the cerebellar hemisphere with superimposed mild diffuse symmetric cortically based diffusion signal abnormality throughout both cerebral hemispheres and to a lesser extent involving the basal ganglia. Findings remain concerning for areas of evolving acute/subacute infarct with possible superimposed global anoxic injury, noting that additional radiology such as sequela of seizure-like activity is also considered. Sequela of infectious or non infectious inflammatory cerebritis is also considered but felt less likely."    EEG with diffuse background suppression.    See "Cardiac arrest" for management.     Obstructive hydrocephalus  10/31/2024 MRI - "Marked effacement about the cerebellar vermis and hemispheres with effacement of the 4th ventricle, slight upward cerebellar tonsillar herniation, and crowding of the cerebellar tonsils at the foramen magnum. There is resultant upstream obstructive hydrocephalus involving the lateral and 3rd ventricles with probable transependymal flow of CSF."    - NSGY consulted  - Goal Na > 150; sodium q6h, 250 cc 3% hypertonic saline as needed    Seizure  Patient initially exhibited head and eye twitching at outside hospital post-cardiac arrest. CT head negative for acute intracranial processes. Transferred to [facility] [place] for further care. EEG without evidence of seizure thus far.    - EEG break, consider re-hooking tomorrow     Vascular dementia  Holding home Aricept in the setting of acute illness    Ophtho  Glaucoma  - Start Cosopt drops per Ophtho recs  - Optho recommending "comfort care" of left ey," please see note dated " "10/31 for more details     Pulmonary  Acute respiratory failure  Respiratory failure which is Acute.  he is not on home oxygen. Supplemental oxygen was provided and noted- Vent Mode: A/C  Oxygen Concentration (%):  [40] 40  Resp Rate Total:  [16 br/min-28 br/min] 20 br/min  Vt Set:  [450 mL] 450 mL  PEEP/CPAP:  [5 cmH20] 5 cmH20  Mean Airway Pressure:  [5.4 vkD10-28 cmH20] 11 cmH20    .   Signs/symptoms of respiratory failure include- tachypnea and respiratory distress. Contributing diagnoses includes -  cardiac arrest  Labs and images were reviewed. Patient Has recent ABG, which has been reviewed. Will treat underlying causes and adjust management of respiratory failure as follows-     Recent Labs     10/30/24  0525 10/31/24  0452   PH 7.350 7.438   PCO2 34.8* 31.9*   PO2 182* 165*   HCO3 19.2* 21.6*   POCSATURATED 100 100   BE -6* -3*       Cardiac/Vascular  * Cardiac arrest  Patient experienced out-of-hospital cardiac arrest. CPR initiated by family. ROSC after 6 rounds of epi. No documentation of rhythm or shocks delivered. Echo 10/29 EF 67%, no wall motion abnormalities, no severe valvular disease. EKG 10/29 NSR with LAD and new RBBB    - TTM - achieved goal temp 7:58 AM on Thursday 10/31/2024  - F/u NSE  - Patient is now a DNR  - Lactic acidosis downtrended  - F/u repeat TTE    Bifascicular block  - Cardiology consulted, appreciate recs  - Monitor on telemetry  - Avoid beta-blockers  - Call CCU and Cardiology ASAP if patient develops heart block    Elevated troponin  Recent Labs     10/30/24  0824 10/30/24  1145 10/30/24  2039   TROPONINI 0.851* 0.725* 0.473*     Downtrending. Per Cardiology - "Increased troponin likely caused by chest compressions and subsequent cardiac contusion."    Hyperlipidemia  Continue home pravastatin 40 mg daily    Essential hypertension  Patients blood pressure range in the last 24 hours was: BP  Min: 74/45  Max: 267/162.The patient's inpatient anti-hypertensive regimen is listed " below:  Current Antihypertensives  amLODIPine tablet 10 mg, Daily, Per OG tube  hydrALAZINE injection 10 mg, Every 4 hours PRN, Intravenous  timolol maleate 0.5% ophthalmic solution 1 drop, 2 times daily, Both Eyes    Plan  - BP is controlled, no changes needed to their regimen    Renal/  Penile lesion  Multiple penile ulcerations on glans. S/p IM Penicillin x 1 on 10/30 (Treponema negative but ID curbside recommended treating as it is possible patient had not yet seroconverted.)     - F/u penile swab culture    Metabolic acidosis  RESOLVED    ABG on admit - pH 7.16, CO2 35.1, O2 509.2, HCO3 12.3.     Chronic suprapubic catheter  Patient on alfuzosin, finasteride, and oxybutynin at home; holding in setting of acute illness with chronic suprapubic catheter in place. Outside catheter changed out on 10/30.    Urinary tract infection associated with catheterization of urinary tract  UA positive for nitrites, leukocyte esterase, WBC, and bacteria. Repeat U/A after catheter exchange still consistent with UTI.     - Continue meropenem 1 g IV q8h  - F/u urine culture    Oncology  Microcytic anemia  Anemia is likely due to  unknown . Most recent hemoglobin and hematocrit are listed below.  Recent Labs     10/29/24  1301 10/30/24  0332 10/31/24  0005 10/31/24  0452   HGB 8.4* 9.0* 8.5*  --    HCT 27.6* 28.9* 27.3* 28*     Plan  - Monitor serial CBC: Daily  - Transfuse PRBC if patient becomes hemodynamically unstable, symptomatic or H/H drops below 7/21.  - Patient has not received any PRBC transfusions to date  - Patient's anemia is currently stable    GI  Transaminitis  Recent Labs     10/31/24  0514 10/31/24  0850 10/31/24  1319   BILITOT 0.2 0.2 0.3   ALKPHOS 84 81 89   * 142* 144*   ALT 64* 62* 64*     Stable. Continue to monitor with daily CMPs.     Palliative Care  ACP (advance care planning)  See ACP note dated 10/31, patient is DNR. Planning for family meeting Jay 11/3.          The patient is being  Prophylaxed for:  Venous Thromboembolism with: Mechanical or Chemical  Stress Ulcer with: H2B  Ventilator Pneumonia with: chlorhexidine oral care    Activity Orders            Diet NPO: NPO starting at 10/31 1559    Elevate HOB Elevate (30-45 degrees) Elevate HOB to 30 - 45 degrees during feeding unless otherwise stated starting at 10/30 1042    Progressive Mobility Protocol (mobilize patient to their highest level of functioning at least twice daily) starting at 10/30 0800    Turn patient every 2 hours starting at 10/30 0600          DNR    Kathie Garvey MD  Neurocritical Care  [facility] - Neuro Critical Care

## 2024-10-31 NOTE — ASSESSMENT & PLAN NOTE
Patients blood pressure range in the last 24 hours was: BP  Min: 74/45  Max: 267/162.The patient's inpatient anti-hypertensive regimen is listed below:  Current Antihypertensives  amLODIPine tablet 10 mg, Daily, Per OG tube  hydrALAZINE injection 10 mg, Every 4 hours PRN, Intravenous  timolol maleate 0.5% ophthalmic solution 1 drop, 2 times daily, Both Eyes    Plan  - BP is controlled, no changes needed to their regimen

## 2024-10-31 NOTE — CONSULTS
[facility] - Neuro Critical Care  Wound Care    Patient Name:  Irvin Nuñez Jr.   MRN:  [id number]  Date: 10/31/2024  Diagnosis: Cardiac arrest    History:     Past Medical History:   Diagnosis Date    High cholesterol     Hypertension     Incontinence of urine     Schizophrenia     Vascular dementia        Social History     Socioeconomic History    Marital status: Single   Tobacco Use    Smoking status: Former    Smokeless tobacco: Never   Substance and Sexual Activity    Alcohol use: Not Currently     Comment: quit 2022    Drug use: No    Sexual activity: Not Currently   Social History Narrative    ** Merged History Encounter **          Social Drivers of Health     Financial Resource Strain: Patient Unable To Answer (10/29/2024)    Overall Financial Resource Strain (CARDIA)     Difficulty of Paying Living Expenses: Patient unable to answer   Recent Concern: Financial Resource Strain - Medium Risk (9/18/2024)    Overall Financial Resource Strain (CARDIA)     Difficulty of Paying Living Expenses: Somewhat hard   Food Insecurity: Patient Unable To Answer (10/29/2024)    Hunger Vital Sign     Worried About Running Out of Food in the Last Year: Patient unable to answer     Ran Out of Food in the Last Year: Patient unable to answer   Transportation Needs: Patient Unable To Answer (10/29/2024)    TRANSPORTATION NEEDS     Transportation : Patient unable to answer   Physical Activity: Inactive (9/16/2024)    Exercise Vital Sign     Days of Exercise per Week: 0 days     Minutes of Exercise per Session: 0 min   Stress: Patient Unable To Answer (10/29/2024)    [employer] - Occupational Stress Questionnaire     Feeling of Stress : Patient unable to answer   Housing Stability: Patient Unable To Answer (10/29/2024)    Housing Stability Vital Sign     Unable to Pay for Housing in the Last Year: Patient unable to answer     Homeless in the Last Year: Patient unable to answer       Precautions:      Allergies as of 10/29/2024    (No Known Allergies)       WO Assessment Details/Treatment     Pt seen for wound care consult for penis, suprapubic cath.  Chart reviewed for this encounter.  See flowsheets for findings.    Pt found lying in bed, OK for care at this time.   Split gauze dressing removed from suprapubic catheter insertion site, no drainage appreciated. Site CDI w/ small amount of redness around insertion point, expected. Cleansed w/ NS and dressed w/ split gauze.   Dorsal aspect of penis w/ dry flaky maroon skin w/ small area of moist partial thickness skin loss. Cleansed w/ Vashe, Triad applied to support Autolytic debridement.    RECOMMENDATIONS:  Daily - suprapubic site - cleanse gently w/ NS, pat dry and apply split gauze dressing.    BID/PRN - penis - cleanse gently w/ Vashe, pat dry and apply Triad to dorsal aspect of penis.     10/31/24 0905   WO Assessment   WOCN Total Time (mins) 15   Visit Date 10/31/24   Visit Time 0905   Consult Type New   WO Speciality Wound   Intervention assessed;changed;applied;chart review;coordination of care;orders   Teaching on-going        Wound 10/29/24 1720 Skin Tear Penis   Date First Assessed/Time First Assessed: 10/29/24 1720   Present on Original Admission: Yes  Primary Wound Type: Skin Tear  Location: Penis   Wound Image    Dressing Appearance Open to air   Drainage Amount Scant   Drainage Characteristics/Odor Serosanguineous   Appearance Pink;Red;Eschar;Moist   Periwound Area Intact;Moist   Wound Edges Irregular;Open   Care Cleansed with:;Antimicrobial agent;Applied:;Skin Barrier        Suprapubic Catheter 10/30/24 1255   Placement Date/Time: 10/30/24 1255   Present Prior to Hospital Arrival?: No  Inserted by: RN  Drainage Method: drainage bag to dependent drainage  Urine Returned: Yes   Dressing gauze dressing   Characteristics dry

## 2024-11-01 PROBLEM — R79.89 ELEVATED TROPONIN: Status: RESOLVED | Noted: 2024-01-01 | Resolved: 2024-01-01

## 2024-11-01 PROBLEM — E87.20 METABOLIC ACIDOSIS: Status: RESOLVED | Noted: 2024-01-01 | Resolved: 2024-01-01

## 2024-11-01 NOTE — PLAN OF CARE
[facility] Care Plan    POC reviewed with Irvin Nuñez Jr. at 0305. Patient unable to verbalize understanding. Questions and concerns addressed. No acute events today. Pt progressing toward goals. Will continue to monitor. See below and flowsheets for full assessment and VS info.     Fentanyl gtt cont to promote pt comfort/vent synchrony.   PRN 50 mcg fent push x2  PRN 3% bolus admin x3 for Na's 143, 146, & 145  Phos (2.4) replaced.      Is this a stroke patient? no    Neuro:  [place] Coma Scale  Best Eye Response: 2-->(E2) to pain  Best Motor Response: 2-->(M2) extension to pain  Best Verbal Response: 1-->(V1) none  Mary Coma Scale Score: 5  Assessment Qualifiers: patient intubated  Pupil PERRLA: no     24 hr Temp:  [97.2 °F (36.2 °C)-100.9 °F (38.3 °C)]     CV:   Rhythm: normal sinus rhythm  BP goals:   SBP < 180  MAP > 65    Resp:      Vent Mode: A/C  Set Rate: 16 BPM  Oxygen Concentration (%): 40  Vt Set: 450 mL  PEEP/CPAP: 5 cmH20    Plan:  fam meeting 11/3 for [facility]    GI/:     Diet/Nutrition Received: tube feeding  Last Bowel Movement: 10/28/24  Voiding Characteristics: suprapubic catheter    Intake/Output Summary (Last 24 hours) at 11/1/2024 0327  Last data filed at 11/1/2024 0302  Gross per 24 hour   Intake 2952.09 ml   Output 1840 ml   Net 1112.09 ml     Unmeasured Output  Stool Occurrence: 0    Labs/Accuchecks:  Recent Labs   Lab 11/01/24  0004   WBC 7.47   RBC 3.93*   HGB 8.4*   HCT 26.8*   *      Recent Labs   Lab 11/01/24  0004   *   K 3.9   CO2 23   *   BUN 13   CREATININE 0.7   ALKPHOS 77   ALT 55*   *   BILITOT 0.2      Recent Labs   Lab 10/30/24  0332 10/31/24  0005   INR 1.1 1.1   APTT 29.3  --       Recent Labs   Lab 10/30/24  2039 10/31/24  0850 10/31/24  2031   CPK 2724*   < > 2263*   TROPONINI 0.473*  --   --     < > = values in this interval not displayed.       Electrolytes: Electrolytes replaced  Accuchecks: none    Gtts:   fentanyl  0-150 mcg/hr Intravenous  Continuous 8.8 mL/hr at 11/01/24 0302 87.5 mcg/hr at 11/01/24 0302    propofol  25 mcg/kg/min Intravenous Continuous   Stopped at 10/31/24 1250       LDA/Wounds:    Hesham Risk Assessment  Sensory Perception: 1-->completely limited  Moisture: 3-->occasionally moist  Activity: 1-->bedfast  Mobility: 1-->completely immobile  Nutrition: 2-->probably inadequate  Friction and Shear: 2-->potential problem  Hesham Score: 10  Is your hesham score 12 or less? yes heel boots on    Nurses Note -- 4 Eyes    Is there altered skin present?  yes     Please check the following boxes that apply:   [] LDA Added if Not in Epic (Describe Wound)   [] New Altered Skin Integrity was Present on Admit and Documented in LDA   [] Wound Image Taken    Wound Care Consulted? Yes     Second RN/Staff Member:  LINDA Bacon       Problem: Adult Inpatient Plan of Care  Goal: Plan of Care Review  Outcome: Progressing     Problem: Infection  Goal: Absence of Infection Signs and Symptoms  Outcome: Progressing     Problem: Mechanical Ventilation Invasive  Goal: Optimal Device Function  Outcome: Progressing

## 2024-11-01 NOTE — ASSESSMENT & PLAN NOTE
Anemia is likely due to  unknown . Most recent hemoglobin and hematocrit are listed below.  Recent Labs     10/30/24  0332 10/31/24  0005 10/31/24  0452 11/01/24  0004 11/01/24  0403   HGB 9.0* 8.5*  --  8.4*  --    HCT 28.9* 27.3* 28* 26.8* 28*     Plan  - Monitor serial CBC: Daily  - Transfuse PRBC if patient becomes hemodynamically unstable, symptomatic or H/H drops below 7/21.  - Patient has not received any PRBC transfusions to date  - Patient's anemia is currently stable

## 2024-11-01 NOTE — ASSESSMENT & PLAN NOTE
Patient initially exhibited head and eye twitching at outside hospital post-cardiac arrest. CT head negative for acute intracranial processes. Transferred to [facility] for further care. EEG without evidence of seizure thus far.    - EEG break, consider re-hooking tomorrow

## 2024-11-01 NOTE — ASSESSMENT & PLAN NOTE
UA positive for nitrites, leukocyte esterase, WBC, and bacteria. Repeat U/A after catheter exchange still consistent with UTI.     - Continue meropenem 1 g IV q8h x 5 days per ID

## 2024-11-01 NOTE — SUBJECTIVE & OBJECTIVE
Interval History:  See hospital course    Review of Systems   Unable to perform ROS: Intubated     Objective:     Vitals:  Temp: 99.1 °F (37.3 °C)  Pulse: 81  Rhythm: normal sinus rhythm  BP: (!) 171/77  MAP (mmHg): 111  Resp: 12  SpO2: 100 %  Oxygen Concentration (%): 40  Vent Mode: A/C  Set Rate: 16 BPM  Vt Set: 450 mL  PEEP/CPAP: 5 cmH20  Peak Airway Pressure: 17 cmH20  Mean Airway Pressure: 8.3 cmH20  Plateau Pressure: 0 cmH20    Temp  Min: 97.2 °F (36.2 °C)  Max: 101.1 °F (38.4 °C)  Pulse  Min: 70  Max: 134  BP  Min: 117/56  Max: 192/88  MAP (mmHg)  Min: 81  Max: 126  Resp  Min: 11  Max: 23  SpO2  Min: 99 %  Max: 100 %  Oxygen Concentration (%)  Min: 40  Max: 40    10/31 0701 - 11/01 0700  In: 2840.6 [I.V.:1405.6]  Out: 1865 [Urine:1865]   Unmeasured Output  Stool Occurrence: 0        Physical Exam  Vitals and nursing note reviewed.   Constitutional:       Appearance: He is ill-appearing.   HENT:      Head: Normocephalic and atraumatic.   Cardiovascular:      Rate and Rhythm: Normal rate and regular rhythm.   Pulmonary:      Comments: Mechanical breath sounds  Musculoskeletal:      Right lower leg: No edema.      Left lower leg: No edema.   Skin:     General: Skin is warm and dry.   Neurological:      Mental Status: He is unresponsive.      GCS: GCS eye subscore is 1. GCS verbal subscore is 1. GCS motor subscore is 2.      Comments: - Subtle R corneal intact  - RUE extensor to pain  - LUE, B/L LE no movement        Medications:  Continuousfentanyl, Last Rate: 100 mcg/hr (11/01/24 1401)  sodium chloride (23.4%) HYPERTONIC 4 mEq/mL 172 mEq in sterile water 500 mL (sodium chloride 2%) infusion, Last Rate: 30 mL/hr at 11/01/24 1421    ScheduledamLODIPine, 10 mg, Daily  dorzolamide, 1 drop, BID  enoxparin, 40 mg, Daily  famotidine, 20 mg, BID  meropenem IV (PEDS and ADULTS), 1 g, Q8H  polyethylene glycol, 17 g, BID  pravastatin, 40 mg, Daily  senna-docusate 8.6-50 mg, 1 tablet, BID  timolol maleate 0.5%, 1 drop,  BID  white petrolatum-mineral oiL, , QID    PRNacetaminophen, 650 mg, Q6H PRN  bisacodyL, 10 mg, Daily PRN  fentaNYL, 50 mcg, Q1H PRN  hydrALAZINE, 10 mg, Q4H PRN  magnesium oxide, 800 mg, PRN  magnesium oxide, 800 mg, PRN  potassium bicarbonate, 35 mEq, PRN  potassium bicarbonate, 50 mEq, PRN  potassium bicarbonate, 60 mEq, PRN  potassium, sodium phosphates, 2 packet, PRN  potassium, sodium phosphates, 2 packet, PRN  potassium, sodium phosphates, 2 packet, PRN  sodium chloride 0.9%, 10 mL, PRN      Today I personally reviewed pertinent medications, lines/drains/airways, imaging, cardiology results, laboratory results, microbiology results,     Diet  Diet NPO

## 2024-11-01 NOTE — ASSESSMENT & PLAN NOTE
Recent Labs     10/31/24  0850 10/31/24  1319 11/01/24  0004   BILITOT 0.2 0.3 0.2   ALKPHOS 81 89 77   * 144* 122*   ALT 62* 64* 55*     Stable. Continue to monitor with daily CMPs.

## 2024-11-01 NOTE — ASSESSMENT & PLAN NOTE
"- Start Cosopt drops per Ophtho recs  - Optho recommending "comfort care" of left eye" please see note dated 10/31 for more details   "

## 2024-11-01 NOTE — ASSESSMENT & PLAN NOTE
Respiratory failure which is Acute.  he is not on home oxygen. Supplemental oxygen was provided and noted- Vent Mode: A/C  Oxygen Concentration (%):  [40] 40  Resp Rate Total:  [16 br/min-22 br/min] 16 br/min  Vt Set:  [450 mL] 450 mL  PEEP/CPAP:  [5 cmH20] 5 cmH20  Mean Airway Pressure:  [8.3 vfP82-50 cmH20] 8.3 cmH20    .   Signs/symptoms of respiratory failure include- tachypnea and respiratory distress. Contributing diagnoses includes -  cardiac arrest  Labs and images were reviewed. Patient Has recent ABG, which has been reviewed. Will treat underlying causes and adjust management of respiratory failure as follows-     Recent Labs     10/30/24  0525 10/31/24  0452 11/01/24  0403   PH 7.350 7.438 7.377   PCO2 34.8* 31.9* 45.1*   PO2 182* 165* 132*   HCO3 19.2* 21.6* 26.5   POCSATURATED 100 100 99   BE -6* -3* 1

## 2024-11-01 NOTE — PROGRESS NOTES
[facility] - Neuro Critical Care  Neurocritical Care  Progress Note    Admit Date: 10/30/2024  Service Date: 11/01/2024  Length of Stay: 2    Subjective:     Chief Complaint: Cardiac arrest    History of Present Illness: Irvin uNñez is a 73 yo male with PMHx HTN, HLD, schizophrenia, and vascular dementia who was transferred from OSH ED to [facility] for seizure-like activity post cardiac arrest. Per family at OSH, patient has been having syncopal episodes for the past 4 months which have become much more frequent in the past 1-2 months. Today patient had another syncopal episode and collapsed at home. CPR was initiated by family and received 6 rounds of epi while being transported via [place]. Patient was intubated and ROSC achieved en route. CT head neg for acute intracranial processes. Echo showed EF 67%. EKG NSR. Lactic acid and trop elevated. UA positive for nitrites, leukocyte esterase, WBC, and bacteria. K 3.2. ABG with metabolic acidosis with partial respiratory compensation. While at OSH ED, patient had intermittent twitching of the head and eyes concerning for possible seizure like activity. He was kept on prop 30 for suppression of any possible seizure activity. He was transferred to [facility] for further workup and higher level of care.     Hospital Course: 10/31/2024: Rewarming started at 4AM, achieved target temp at 7:58AM. EEG with diffusely suppressed background. MRI consistent with global anoxic injury and obstructive hydrocephalus. Made DNR after conversation with brother and sister at bedside (see ACP note.)  11/01/2024: Overnight patient required both cooling and Jessica hugger to maintain normothermia. Fent started due to tachypnea, tachycardia, vent dyssynchrony. Planning for family meeting Sunday.    Interval History:  See hospital course    Review of Systems   Unable to perform ROS: Intubated     Objective:     Vitals:  Temp: 99.1 °F (37.3 °C)  Pulse: 81  Rhythm: normal sinus rhythm  BP: (!) 171/77  MAP  (mmHg): 111  Resp: 12  SpO2: 100 %  Oxygen Concentration (%): 40  Vent Mode: A/C  Set Rate: 16 BPM  Vt Set: 450 mL  PEEP/CPAP: 5 cmH20  Peak Airway Pressure: 17 cmH20  Mean Airway Pressure: 8.3 cmH20  Plateau Pressure: 0 cmH20    Temp  Min: 97.2 °F (36.2 °C)  Max: 101.1 °F (38.4 °C)  Pulse  Min: 70  Max: 134  BP  Min: 117/56  Max: 192/88  MAP (mmHg)  Min: 81  Max: 126  Resp  Min: 11  Max: 23  SpO2  Min: 99 %  Max: 100 %  Oxygen Concentration (%)  Min: 40  Max: 40    10/31 0701 - 11/01 0700  In: 2840.6 [I.V.:1405.6]  Out: 1865 [Urine:1865]   Unmeasured Output  Stool Occurrence: 0        Physical Exam  Vitals and nursing note reviewed.   Constitutional:       Appearance: He is ill-appearing.   HENT:      Head: Normocephalic and atraumatic.   Cardiovascular:      Rate and Rhythm: Normal rate and regular rhythm.   Pulmonary:      Comments: Mechanical breath sounds  Musculoskeletal:      Right lower leg: No edema.      Left lower leg: No edema.   Skin:     General: Skin is warm and dry.   Neurological:      Mental Status: He is unresponsive.      GCS: GCS eye subscore is 1. GCS verbal subscore is 1. GCS motor subscore is 2.      Comments: - Subtle R corneal intact  - RUE extensor to pain  - LUE, B/L LE no movement        Medications:  Continuousfentanyl, Last Rate: 100 mcg/hr (11/01/24 1401)  sodium chloride (23.4%) HYPERTONIC 4 mEq/mL 172 mEq in sterile water 500 mL (sodium chloride 2%) infusion, Last Rate: 30 mL/hr at 11/01/24 1421    ScheduledamLODIPine, 10 mg, Daily  dorzolamide, 1 drop, BID  enoxparin, 40 mg, Daily  famotidine, 20 mg, BID  meropenem IV (PEDS and ADULTS), 1 g, Q8H  polyethylene glycol, 17 g, BID  pravastatin, 40 mg, Daily  senna-docusate 8.6-50 mg, 1 tablet, BID  timolol maleate 0.5%, 1 drop, BID  white petrolatum-mineral oiL, , QID    PRNacetaminophen, 650 mg, Q6H PRN  bisacodyL, 10 mg, Daily PRN  fentaNYL, 50 mcg, Q1H PRN  hydrALAZINE, 10 mg, Q4H PRN  magnesium oxide, 800 mg, PRN  magnesium oxide,  "800 mg, PRN  potassium bicarbonate, 35 mEq, PRN  potassium bicarbonate, 50 mEq, PRN  potassium bicarbonate, 60 mEq, PRN  potassium, sodium phosphates, 2 packet, PRN  potassium, sodium phosphates, 2 packet, PRN  potassium, sodium phosphates, 2 packet, PRN  sodium chloride 0.9%, 10 mL, PRN      Today I personally reviewed pertinent medications, lines/drains/airways, imaging, cardiology results, laboratory results, microbiology results,     Diet  Diet NPO    Assessment/Plan:     Neuro  Brain hypoxic injury  10/31/2024 MRI - "Focal cortically based diffusion restriction about the right frontal lobe, left greater than right occipital lobes, and involving the cerebellar hemisphere with superimposed mild diffuse symmetric cortically based diffusion signal abnormality throughout both cerebral hemispheres and to a lesser extent involving the basal ganglia. Findings remain concerning for areas of evolving acute/subacute infarct with possible superimposed global anoxic injury, noting that additional radiology such as sequela of seizure-like activity is also considered. Sequela of infectious or non infectious inflammatory cerebritis is also considered but felt less likely."    EEG with diffuse background suppression.    See "Cardiac arrest" for management.     Obstructive hydrocephalus  10/31/2024 MRI - "Marked effacement about the cerebellar vermis and hemispheres with effacement of the 4th ventricle, slight upward cerebellar tonsillar herniation, and crowding of the cerebellar tonsils at the foramen magnum. There is resultant upstream obstructive hydrocephalus involving the lateral and 3rd ventricles with probable transependymal flow of CSF."    - NSGY consulted  - Goal Na > 150; sodium q6h  - Start 2% hypertonic saline @ 30 cc/hr    Seizure  Patient initially exhibited head and eye twitching at outside hospital post-cardiac arrest. CT head negative for acute intracranial processes. Transferred to [facility] for further care. EEG " "without evidence of seizure thus far.    - EEG break, consider re-hooking tomorrow     Vascular dementia  Holding home Aricept in the setting of acute illness    Ophtho  Glaucoma  - Start Cosopt drops per Ophtho recs  - Optho recommending "comfort care" of left eye" please see note dated 10/31 for more details     Pulmonary  Acute respiratory failure  Respiratory failure which is Acute.  he is not on home oxygen. Supplemental oxygen was provided and noted- Vent Mode: A/C  Oxygen Concentration (%):  [40] 40  Resp Rate Total:  [16 br/min-22 br/min] 16 br/min  Vt Set:  [450 mL] 450 mL  PEEP/CPAP:  [5 cmH20] 5 cmH20  Mean Airway Pressure:  [8.3 evW38-44 cmH20] 8.3 cmH20    .   Signs/symptoms of respiratory failure include- tachypnea and respiratory distress. Contributing diagnoses includes -  cardiac arrest  Labs and images were reviewed. Patient Has recent ABG, which has been reviewed. Will treat underlying causes and adjust management of respiratory failure as follows-     Recent Labs     10/30/24  0525 10/31/24  0452 11/01/24  0403   PH 7.350 7.438 7.377   PCO2 34.8* 31.9* 45.1*   PO2 182* 165* 132*   HCO3 19.2* 21.6* 26.5   POCSATURATED 100 100 99   BE -6* -3* 1       Cardiac/Vascular  * Cardiac arrest  Patient experienced out-of-hospital cardiac arrest. CPR initiated by family. ROSC after 6 rounds of epi. No documentation of rhythm or shocks delivered. Echo 10/29 EF 67%, no wall motion abnormalities, no severe valvular disease. EKG 10/29 NSR with LAD and new RBBB    - TTM - achieved goal temp 7:58 AM on Thursday 10/31/2024  - F/u NSE  - Patient is now a DNR  - Lactic acidosis downtrended    Bifascicular block  - Cardiology consulted, appreciate recs  - Monitor on telemetry  - Avoid beta-blockers  - Call CCU and Cardiology ASAP if patient develops heart block    Hyperlipidemia  Continue home pravastatin 40 mg daily    Essential hypertension  Patients blood pressure range in the last 24 hours was: BP  Min: 74/45  " Max: 267/162.The patient's inpatient anti-hypertensive regimen is listed below:  Current Antihypertensives  amLODIPine tablet 10 mg, Daily, Per OG tube  hydrALAZINE injection 10 mg, Every 4 hours PRN, Intravenous  timolol maleate 0.5% ophthalmic solution 1 drop, 2 times daily, Both Eyes    Plan  - BP is controlled, no changes needed to their regimen    Renal/  Penile lesion  Multiple penile ulcerations on glans. S/p IM Penicillin x 1 on 10/30 (Treponema negative but ID curbside recommended treating as it is possible patient had not yet seroconverted.)     - F/u penile swab culture    Chronic suprapubic catheter  Patient on alfuzosin, finasteride, and oxybutynin at home; holding in setting of acute illness with chronic suprapubic catheter in place. Outside catheter changed out on 10/30.    Urinary tract infection associated with catheterization of urinary tract  UA positive for nitrites, leukocyte esterase, WBC, and bacteria. Repeat U/A after catheter exchange still consistent with UTI.     - Continue meropenem 1 g IV q8h x 5 days per ID    Oncology  Microcytic anemia  Anemia is likely due to  unknown . Most recent hemoglobin and hematocrit are listed below.  Recent Labs     10/30/24  0332 10/31/24  0005 10/31/24  0452 11/01/24  0004 11/01/24  0403   HGB 9.0* 8.5*  --  8.4*  --    HCT 28.9* 27.3* 28* 26.8* 28*     Plan  - Monitor serial CBC: Daily  - Transfuse PRBC if patient becomes hemodynamically unstable, symptomatic or H/H drops below 7/21.  - Patient has not received any PRBC transfusions to date  - Patient's anemia is currently stable    GI  Transaminitis  Recent Labs     10/31/24  0850 10/31/24  1319 11/01/24  0004   BILITOT 0.2 0.3 0.2   ALKPHOS 81 89 77   * 144* 122*   ALT 62* 64* 55*     Stable. Continue to monitor with daily CMPs.     Palliative Care  ACP (advance care planning)  See ACP note dated 10/31, patient is DNR. Planning for family meeting Jay 11/3.          The patient is being  Prophylaxed for:  Venous Thromboembolism with: Mechanical or Chemical  Stress Ulcer with: H2B  Ventilator Pneumonia with: chlorhexidine oral care    Activity Orders            Diet NPO: NPO starting at 10/31 1559    Elevate HOB Elevate (30-45 degrees) Elevate HOB to 30 - 45 degrees during feeding unless otherwise stated starting at 10/30 1042    Progressive Mobility Protocol (mobilize patient to their highest level of functioning at least twice daily) starting at 10/30 0800    Turn patient every 2 hours starting at 10/30 0600          DNR    Kathie Garvey MD  Neurocritical Care  [facility] - Neuro Critical Care

## 2024-11-01 NOTE — ASSESSMENT & PLAN NOTE
Patient experienced out-of-hospital cardiac arrest. CPR initiated by family. ROSC after 6 rounds of epi. No documentation of rhythm or shocks delivered. Echo 10/29 EF 67%, no wall motion abnormalities, no severe valvular disease. EKG 10/29 NSR with LAD and new RBBB    - TTM - achieved goal temp 7:58 AM on Thursday 10/31/2024  - F/u NSE  - Patient is now a DNR  - Lactic acidosis downtrended

## 2024-11-01 NOTE — ASSESSMENT & PLAN NOTE
"10/31/2024 MRI - "Marked effacement about the cerebellar vermis and hemispheres with effacement of the 4th ventricle, slight upward cerebellar tonsillar herniation, and crowding of the cerebellar tonsils at the foramen magnum. There is resultant upstream obstructive hydrocephalus involving the lateral and 3rd ventricles with probable transependymal flow of CSF."    - NSGY consulted  - Goal Na > 150; sodium q6h  - Start 2% hypertonic saline @ 30 cc/hr  "

## 2024-11-02 NOTE — ASSESSMENT & PLAN NOTE
Patient experienced out-of-hospital cardiac arrest. CPR initiated by family. ROSC after 6 rounds of epi. No documentation of rhythm or shocks delivered. Echo 10/29 EF 67%, no wall motion abnormalities, no severe valvular disease. EKG 10/29 NSR with LAD and new RBBB    - TTM - achieved goal temp 7:58 AM on Thursday 10/31/2024  -   - Patient is now a DNR  - Lactic acidosis downtrended

## 2024-11-02 NOTE — ASSESSMENT & PLAN NOTE
Recent Labs     10/31/24  1319 11/01/24  0004 11/02/24  0857   BILITOT 0.3 0.2 0.2   ALKPHOS 89 77 85   * 122* 114*   ALT 64* 55* 57*     Stable. Continue to monitor with daily CMPs.

## 2024-11-02 NOTE — ASSESSMENT & PLAN NOTE
Patient initially exhibited head and eye twitching at outside hospital post-cardiac arrest. CT head negative for acute intracranial processes. Transferred to [facility] for further care. EEG without evidence of seizure but with diffuse, severe burst suppression.

## 2024-11-02 NOTE — ASSESSMENT & PLAN NOTE
"- Continue Cosopt drops per Ophtho recs  - Optho recommending "comfort care" of left eye" please see note dated 10/31 for more details   " No

## 2024-11-02 NOTE — ASSESSMENT & PLAN NOTE
Respiratory failure which is Acute.  he is not on home oxygen. Supplemental oxygen was provided and noted- Vent Mode: A/C  Oxygen Concentration (%):  [40] 40  Resp Rate Total:  [16 br/min-19 br/min] 18 br/min  Vt Set:  [450 mL-480 mL] 480 mL  PEEP/CPAP:  [5 cmH20] 5 cmH20  Mean Airway Pressure:  [8.4 cmH20-10 cmH20] 9.4 cmH20    .   Signs/symptoms of respiratory failure include- tachypnea and respiratory distress. Contributing diagnoses includes -  cardiac arrest  Labs and images were reviewed. Patient Has recent ABG, which has been reviewed. Will treat underlying causes and adjust management of respiratory failure as follows-     Recent Labs     10/31/24  0452 11/01/24  0403 11/02/24  0357   PH 7.438 7.377 7.374   PCO2 31.9* 45.1* 52.5*   PO2 165* 132* 123*   HCO3 21.6* 26.5 30.6*   POCSATURATED 100 99 99   BE -3* 1 5*

## 2024-11-02 NOTE — PLAN OF CARE
[facility] Care Plan  POC reviewed with Irvin Nuñez Jr. and family at 1400. Family verbalized understanding. Questions and concerns addressed. No acute events today. Will continue to monitor. See below and flowsheets for full assessment and VS info.     - Goals of care conference arranged for 1130 11/3  - MRI scheduled for 11/3 AM  - Isosource at goal of 50 mL/hr  - 2% infusion currently infusing at 60 mL/hr per MAR  - q6 Na for Na goal >150 continued  - Fentanyl gtt infusing at 75 mcg/h [Max per order of 150 mcg/h]  - CHG bath completed      Is this a stroke patient? no    Neuro:  Mary Coma Scale  Best Eye Response: 2-->(E2) to pain  Best Motor Response: 2-->(M2) extension to pain  Best Verbal Response: 1-->(V1) none  [place] Coma Scale Score: 5  Assessment Qualifiers: patient intubated  Pupil PERRLA: no     24 hr Temp:  [97 °F (36.1 °C)-100 °F (37.8 °C)]     CV:   Rhythm: normal sinus rhythm  BP goals:   SBP < 180  MAP > 65    Resp:      Vent Mode: A/C  Set Rate: 18 BPM  Oxygen Concentration (%): 40  Vt Set: 480 mL  PEEP/CPAP: 5 cmH20    Plan: wean to extubate, trach/PEG discussions, and goals of care discussion planned    GI/:     Diet/Nutrition Received: tube feeding  Last Bowel Movement: 10/28/24  Voiding Characteristics: suprapubic catheter    Intake/Output Summary (Last 24 hours) at 11/2/2024 1652  Last data filed at 11/2/2024 1601  Gross per 24 hour   Intake 2438.64 ml   Output 995 ml   Net 1443.64 ml     Unmeasured Output  Stool Occurrence: 0    Labs/Accuchecks:  Recent Labs   Lab 11/02/24  0857   WBC 5.28   RBC 3.72*   HGB 7.9*   HCT 26.8*   *      Recent Labs   Lab 11/02/24  0857 11/02/24  1217   * 150*   K 3.8  --    CO2 26  --    *  --    BUN 17  --    CREATININE 0.7  --    ALKPHOS 85  --    ALT 57*  --    *  --    BILITOT 0.2  --       Recent Labs   Lab 10/30/24  0332 10/31/24  0005   INR 1.1 1.1   APTT 29.3  --       Recent Labs   Lab 10/30/24  2039 10/31/24  0850  11/02/24  0556   CPK 2724*   < > 1634*   TROPONINI 0.473*  --   --     < > = values in this interval not displayed.       Electrolytes: Electrolytes replaced  Accuchecks: none    Gtts:   fentanyl  0-150 mcg/hr Intravenous Continuous 5 mL/hr at 11/02/24 1601 50 mcg/hr at 11/02/24 1601    sodium chloride (23.4%) HYPERTONIC 4 mEq/mL 172 mEq in sterile water 500 mL (sodium chloride 2%) infusion   Intravenous Continuous 60 mL/hr at 11/02/24 1601 Rate Verify at 11/02/24 1601       LDA/Wounds:    Hesham Risk Assessment  Sensory Perception: 1-->completely limited  Moisture: 3-->occasionally moist  Activity: 1-->bedfast  Mobility: 1-->completely immobile  Nutrition: 3-->adequate  Friction and Shear: 2-->potential problem  Hesham Score: 11    Is your hesham score 12 or less? yes enrolled in the [facility] program, hesham mobility score is 2 or less, they are on a immerse bed, if their moisture score is 2 or less, they do not have a sacral mepilex and instead have zinc barrier cream, and heel boots on      Nurses Note -- 4 Eyes    Is there altered skin present?  yes     Please check the following boxes that apply:   [x] LDA Added if Not in Epic (Describe Wound)   [x] New Altered Skin Integrity was Present on Admit and Documented in LDA   [x] Wound Image Taken    Wound Care Consulted? yes     Second RN/Staff Member:  LINDA Limon      Restraints: n/a       [facility]

## 2024-11-02 NOTE — PLAN OF CARE
Patient dispo plan pending. Weekend SW to follow for future needs or changes.     JOSUE Hansen  [facility]   Ext: [phone]

## 2024-11-02 NOTE — SUBJECTIVE & OBJECTIVE
Interval History:  See hospital course    Review of Systems   Unable to perform ROS: Intubated     Objective:     Vitals:  Temp: 99 °F (37.2 °C)  Pulse: 100  Rhythm: normal sinus rhythm  BP: 139/70  MAP (mmHg): 95  Resp: 17  SpO2: 100 %  Oxygen Concentration (%): 40  Vent Mode: A/C  Set Rate: 18 BPM  Vt Set: 480 mL  PEEP/CPAP: 5 cmH20  Peak Airway Pressure: 21 cmH20  Mean Airway Pressure: 9.4 cmH20  Plateau Pressure: 0 cmH20    Temp  Min: 97 °F (36.1 °C)  Max: 100 °F (37.8 °C)  Pulse  Min: 70  Max: 108  BP  Min: 107/58  Max: 191/86  MAP (mmHg)  Min: 78  Max: 123  Resp  Min: 2  Max: 18  SpO2  Min: 100 %  Max: 100 %  Oxygen Concentration (%)  Min: 40  Max: 40    11/01 0701 - 11/02 0700  In: 2081.4 [I.V.:1021.4]  Out: 1120 [Urine:1120]   Unmeasured Output  Stool Occurrence: 0        Physical Exam  Vitals and nursing note reviewed.   Constitutional:       Appearance: He is ill-appearing.   HENT:      Head: Normocephalic and atraumatic.   Cardiovascular:      Rate and Rhythm: Normal rate and regular rhythm.   Pulmonary:      Comments: Mechanical breath sounds  Musculoskeletal:      Right lower leg: No edema.      Left lower leg: No edema.   Skin:     General: Skin is warm and dry.   Neurological:      Mental Status: He is unresponsive.      GCS: GCS eye subscore is 1. GCS verbal subscore is 1. GCS motor subscore is 2.      Comments: - Subtle R corneal intact  - No cough or gag  - Extensor LUE, triple flex LLE  - RUE/RLE no movement        Medications:  Continuousfentanyl, Last Rate: 50 mcg/hr (11/02/24 1101)  sodium chloride (23.4%) HYPERTONIC 4 mEq/mL 172 mEq in sterile water 500 mL (sodium chloride 2%) infusion, Last Rate: 50 mL/hr at 11/02/24 1101    ScheduledamLODIPine, 10 mg, Daily  dorzolamide, 1 drop, BID  enoxparin, 40 mg, Daily  famotidine, 20 mg, BID  meropenem IV (PEDS and ADULTS), 1 g, Q8H  polyethylene glycol, 17 g, BID  pravastatin, 40 mg, Daily  senna-docusate 8.6-50 mg, 2 tablet, BID  timolol maleate  0.5%, 1 drop, BID  white petrolatum-mineral oiL, , QID    PRNacetaminophen, 650 mg, Q6H PRN  bisacodyL, 10 mg, Daily PRN  fentaNYL, 50 mcg, Q1H PRN  hydrALAZINE, 10 mg, Q4H PRN  magnesium oxide, 800 mg, PRN  magnesium oxide, 800 mg, PRN  potassium bicarbonate, 35 mEq, PRN  potassium bicarbonate, 50 mEq, PRN  potassium bicarbonate, 60 mEq, PRN  potassium, sodium phosphates, 2 packet, PRN  potassium, sodium phosphates, 2 packet, PRN  potassium, sodium phosphates, 2 packet, PRN  sodium chloride 0.9%, 10 mL, PRN      Today I personally reviewed pertinent medications, lines/drains/airways, imaging, cardiology results, laboratory results, microbiology results,     Diet  Diet NPO

## 2024-11-02 NOTE — ASSESSMENT & PLAN NOTE
Anemia is likely due to  unknown . Most recent hemoglobin and hematocrit are listed below.  Recent Labs     10/31/24  0005 10/31/24  0452 11/01/24  0004 11/01/24  0403 11/02/24  0357 11/02/24  0857   HGB 8.5*  --  8.4*  --   --  7.9*   HCT 27.3*   < > 26.8* 28* 28* 26.8*    < > = values in this interval not displayed.     Plan  - Monitor serial CBC: Daily  - Transfuse PRBC if patient becomes hemodynamically unstable, symptomatic or H/H drops below 7/21.  - Patient has not received any PRBC transfusions to date  - Patient's anemia is currently stable

## 2024-11-02 NOTE — NURSING
Psychiatric Care Plan    POC reviewed with Irvin Nuñez JrPedro and family at 0300. Patient verbalized understanding. Questions and concerns addressed. No acute events today. Pt progressing toward goals. Will continue to monitor. See below and flowsheets for full assessment and VS info.       - 2% gtt increased to 50 ml / hr  - Fent gtt maintained at 100 mcg / hr      Is this a stroke patient? no    Neuro:  Mary Coma Scale  Best Eye Response: 2-->(E2) to pain  Best Motor Response: 2-->(M2) extension to pain  Best Verbal Response: 1-->(V1) none  [place] Coma Scale Score: 5  Assessment Qualifiers: patient intubated  Pupil PERRLA: no     24 hr Temp:  [97.2 °F (36.2 °C)-101.1 °F (38.4 °C)]     CV:   Rhythm: normal sinus rhythm  BP goals:   SBP < 180  MAP > 65    Resp:      Vent Mode: A/C  Set Rate: 16 BPM  Oxygen Concentration (%): 40  Vt Set: 450 mL  PEEP/CPAP: 5 cmH20    Plan: GOC with family Jay 11/3      GI/:     Diet/Nutrition Received: tube feeding  Last Bowel Movement: 10/28/24  Voiding Characteristics: suprapubic catheter    Intake/Output Summary (Last 24 hours) at 11/2/2024 0648  Last data filed at 11/2/2024 0638  Gross per 24 hour   Intake 2081.36 ml   Output 1120 ml   Net 961.36 ml     Unmeasured Output  Stool Occurrence: 0    Labs/Accuchecks:  Recent Labs   Lab 11/01/24  0004 11/01/24  0403 11/02/24  0357   WBC 7.47  --   --    RBC 3.93*  --   --    HGB 8.4*  --   --    HCT 26.8*   < > 28*   *  --   --     < > = values in this interval not displayed.      Recent Labs   Lab 11/01/24  0004 11/01/24  0556 11/02/24  0556   *   < > 149*   K 3.9  --   --    CO2 23  --   --    *  --   --    BUN 13  --   --    CREATININE 0.7  --   --    ALKPHOS 77  --   --    ALT 55*  --   --    *  --   --    BILITOT 0.2  --   --     < > = values in this interval not displayed.      Recent Labs   Lab 10/30/24  0332 10/31/24  0005   INR 1.1 1.1   APTT 29.3  --       Recent Labs   Lab 10/30/24  2039  10/31/24  0850 11/01/24  0855   CPK 2724*   < > 1879*   TROPONINI 0.473*  --   --     < > = values in this interval not displayed.       Electrolytes: N/A - electrolytes WDL  Accuchecks: none    Gtts:   fentanyl  0-150 mcg/hr Intravenous Continuous 10 mL/hr at 11/02/24 0601 100 mcg/hr at 11/02/24 0601    sodium chloride (23.4%) HYPERTONIC 4 mEq/mL 172 mEq in sterile water 500 mL (sodium chloride 2%) infusion   Intravenous Continuous 50 mL/hr at 11/02/24 0638 New Bag at 11/02/24 0638       LDA/Wounds:    Hesham Risk Assessment  Sensory Perception: 1-->completely limited  Moisture: 3-->occasionally moist  Activity: 1-->bedfast  Mobility: 1-->completely immobile  Nutrition: 3-->adequate  Friction and Shear: 2-->potential problem  Hesham Score: 11  Is your hesham score 12 or less? yes enrolled in the [facility] program, hesham mobility score is 2 or less, they are on a immerse bed, if their moisture score is 2 or less, they do not have a sacral mepilex and instead have zinc barrier cream, and heel boots on    Nurses Note -- 4 Eyes    Is there altered skin present?  Yes    Second RN/Staff Member:  LINDA Driver      Restraints:        MARIMAR

## 2024-11-02 NOTE — PLAN OF CARE
[facility] Care Plan  POC reviewed with Irvin Nuñez Jr. and family at 1400. No acute events today. Pt progressing toward goals. Will continue to monitor. See below and flowsheets for full assessment and VS info.     - Fentanyl infusion continued per MAR  - On/off cooling blanket  - PRN acetaminophen given x1  - 2% infusion initiated for Na goal >150. Currently at 30mL/hr  - q6 Na checks  - Isosource at goal of 50mL/hr        Is this a stroke patient? no    Neuro:  [place] Coma Scale  Best Eye Response: 2-->(E2) to pain  Best Motor Response: 2-->(M2) extension to pain  Best Verbal Response: 1-->(V1) none  [place] Coma Scale Score: 5  Assessment Qualifiers: patient intubated  Pupil PERRLA: no     24 hr Temp:  [97.2 °F (36.2 °C)-101.1 °F (38.4 °C)]     CV:   Rhythm: normal sinus rhythm  BP goals:   SBP < 160  MAP > 65    Resp:      Vent Mode: A/C  Set Rate: 16 BPM  Oxygen Concentration (%): 40  Vt Set: 450 mL  PEEP/CPAP: 5 cmH20    Plan: wean to extubate    GI/:     Diet/Nutrition Received: tube feeding  Last Bowel Movement: 10/28/24  Voiding Characteristics: suprapubic catheter    Intake/Output Summary (Last 24 hours) at 11/1/2024 1920  Last data filed at 11/1/2024 1801  Gross per 24 hour   Intake 2200.89 ml   Output 1515 ml   Net 685.89 ml     Unmeasured Output  Stool Occurrence: 0    Labs/Accuchecks:  Recent Labs   Lab 11/01/24  0004 11/01/24  0403   WBC 7.47  --    RBC 3.93*  --    HGB 8.4*  --    HCT 26.8* 28*   *  --       Recent Labs   Lab 11/01/24  0004 11/01/24  0556 11/01/24  1847   *   < > 147*   K 3.9  --   --    CO2 23  --   --    *  --   --    BUN 13  --   --    CREATININE 0.7  --   --    ALKPHOS 77  --   --    ALT 55*  --   --    *  --   --    BILITOT 0.2  --   --     < > = values in this interval not displayed.      Recent Labs   Lab 10/30/24  0332 10/31/24  0005   INR 1.1 1.1   APTT 29.3  --       Recent Labs   Lab 10/30/24  2039 10/31/24  0850 11/01/24  0855   CPK 2724*   < >  1879*   TROPONINI 0.473*  --   --     < > = values in this interval not displayed.       Electrolytes: N/A - electrolytes WDL  Accuchecks: none    Gtts:   fentanyl  0-150 mcg/hr Intravenous Continuous 10 mL/hr at 11/01/24 1801 100 mcg/hr at 11/01/24 1801    sodium chloride (23.4%) HYPERTONIC 4 mEq/mL 172 mEq in sterile water 500 mL (sodium chloride 2%) infusion   Intravenous Continuous 30 mL/hr at 11/01/24 1801 Rate Verify at 11/01/24 1801       LDA/Wounds:    Hesham Risk Assessment  Sensory Perception: 1-->completely limited  Moisture: 3-->occasionally moist  Activity: 1-->bedfast  Mobility: 1-->completely immobile  Nutrition: 2-->probably inadequate  Friction and Shear: 2-->potential problem  Hesham Score: 10    Is your hesham score 12 or less? yes enrolled in the [facility] program, hesham mobility score is 2 or less, they are on a immerse bed, if their moisture score is 2 or less, they do not have a sacral mepilex and instead have zinc barrier cream, and heel boots on      Nurses Note -- 4 Eyes        Restraints:        [facility]

## 2024-11-02 NOTE — ASSESSMENT & PLAN NOTE
"10/31/2024 MRI - "Marked effacement about the cerebellar vermis and hemispheres with effacement of the 4th ventricle, slight upward cerebellar tonsillar herniation, and crowding of the cerebellar tonsils at the foramen magnum. There is resultant upstream obstructive hydrocephalus involving the lateral and 3rd ventricles with probable transependymal flow of CSF."    - NSGY consulted - nothing to offer  - Goal Na > 150; sodium q6h  - Continue 2% hypertonic saline gtt  "

## 2024-11-03 PROBLEM — Z51.5 COMFORT MEASURES ONLY STATUS: Status: ACTIVE | Noted: 2024-01-01

## 2024-11-03 NOTE — PLAN OF CARE
Patient dispo plan pending. Weekend SW to follow for future needs or changes.      JOSUE Hansen  [facility]   Ext: [phone]

## 2024-11-03 NOTE — PROGRESS NOTES
[facility] - Neuro Critical Care  Neurocritical Care  Progress Note    Admit Date: 10/30/2024  Service Date: 11/03/2024  Length of Stay: 4    Subjective:     Chief Complaint: Cardiac arrest    History of Present Illness: Irvin Nuñez is a 73 yo male with PMHx HTN, HLD, schizophrenia, and vascular dementia who was transferred from OSH ED to [facility] for seizure-like activity post cardiac arrest. Per family at OSH, patient has been having syncopal episodes for the past 4 months which have become much more frequent in the past 1-2 months. Today patient had another syncopal episode and collapsed at home. CPR was initiated by family and received 6 rounds of epi while being transported via [place]. Patient was intubated and ROSC achieved en route. CT head neg for acute intracranial processes. Echo showed EF 67%. EKG NSR. Lactic acid and trop elevated. UA positive for nitrites, leukocyte esterase, WBC, and bacteria. K 3.2. ABG with metabolic acidosis with partial respiratory compensation. While at OSH ED, patient had intermittent twitching of the head and eyes concerning for possible seizure like activity. He was kept on prop 30 for suppression of any possible seizure activity. He was transferred to [facility] for further workup and higher level of care.     Hospital Course: 10/31/2024: Rewarming started at 4AM, achieved target temp at 7:58AM. EEG with diffusely suppressed background. MRI consistent with global anoxic injury and obstructive hydrocephalus. Made DNR after conversation with brother and sister at bedside (see ACP note.)  11/01/2024: Overnight patient required both cooling and Jessica hugger to maintain normothermia. Fent started due to tachypnea, tachycardia, vent dyssynchrony. Planning for family meeting Sunday.  11/02/2024: SASHA. [facility] ordered for overnight followed by family meeting tomorrow.   11/3: family meeting today for GOC and prognostication    Review of Symptoms:   Unable to obtain, intubated, neuro-exam    Physical  "Exam:  GA: comfortable, no acute distress.   HEENT: No scleral icterus or JVD.   Pulmonary: Clear to auscultation A/L. No wheezing, crackles, or rhonchi. intubated  Cardiac: RRR S1 & S2 w/o rubs/murmurs/gallops.   Abdominal: Bowel sounds present x 4. No appreciable hepatosplenomegaly.  Skin: No jaundice, rashes, or visible lesions.  Pulses: 1+ Dp bilat    Neuro:  --sedation: none  --GCS: [id number]  --Mental Status:  no commands  --CN II-XII grossly intact.   --Pupils right eye brisk, left eyes cateracts unable to assess pupil  --brainstem: weak cough and gag  --Motor: bilat upper extensor to pain though minimal, bilat lowers triple though again very minimal movement  --sensory: see motor  --Reflexes: not tested  --Gait: deferred    Recent Labs   Lab 11/03/24  0021   WBC 7.24   RBC 3.92*   HGB 8.4*   HCT 27.7*      MCV 71*   MCH 21.4*   MCHC 30.3*     Recent Labs   Lab 11/03/24  0021 11/03/24  0526   CALCIUM 8.8  --    ALBUMIN 2.2*  --    PROT 5.6*  --    * 154*   K 4.3  --    CO2 30*  --    *  --    BUN 14  --    CREATININE 0.6  --    ALKPHOS 85  --    ALT 61*  --    *  --    BILITOT 0.2  --      No results for input(s): "PT", "INR", "APTT" in the last 24 hours.  Vent Mode: A/C  Oxygen Concentration (%):  [40] 40  Resp Rate Total:  [18 br/min] 18 br/min  Vt Set:  [480 mL] 480 mL  PEEP/CPAP:  [5 cmH20] 5 cmH20  Mean Airway Pressure:  [8.4 cmH20-9.3 cmH20] 8.4 cmH20        Temp: 98.7 °F (37.1 °C)  Pulse: 88  Rhythm: normal sinus rhythm  BP: 138/66  MAP (mmHg): 93  Resp: 18  SpO2: 100 %  Oxygen Concentration (%): 40  Vent Mode: A/C  Set Rate: 18 BPM  Vt Set: 480 mL  PEEP/CPAP: 5 cmH20  Peak Airway Pressure: 16 cmH20  Mean Airway Pressure: 8.4 cmH20  Plateau Pressure: 0 cmH20    Temp  Min: 97.2 °F (36.2 °C)  Max: 99.3 °F (37.4 °C)  Pulse  Min: 78  Max: 118  BP  Min: 105/58  Max: 176/85  MAP (mmHg)  Min: 77  Max: 132  Resp  Min: 0  Max: 22  SpO2  Min: 100 %  Max: 100 %  Oxygen Concentration (%)  " "Min: 40  Max: 40    11/02 0701 - 11/03 0700  In: 2837.8 [I.V.:1537.8]  Out: 1360 [Urine:1360]   Unmeasured Output  Stool Occurrence: 0    Nutrition Prescription Ordered  Current Diet Order: NPO  Current Nutrition Support Formula Ordered: Impact Peptide 1.5  Current Nutrition Support Rate Ordered: 40 (ml)  Current Nutrition Support Frequency Ordered: continuous  Last Bowel Movement: 10/28/24    Body mass index is 21.12 kg/m².      I have personally reviewed all labs, imaging, and studies today    Scheduled Meds:  Continuous Infusions:   fentanyl  0-150 mcg/hr Intravenous Continuous 7.5 mL/hr at 11/03/24 0601 75 mcg/hr at 11/03/24 0601     PRN Meds:.  Current Facility-Administered Medications:     fentaNYL, 50 mcg, Intravenous, Q1H PRN    sodium chloride 0.9%, 10 mL, Intravenous, PRN        Assessment/Plan:     Neuro  Brain hypoxic injury  10/31/2024 MRI - "Focal cortically based diffusion restriction about the right frontal lobe, left greater than right occipital lobes, and involving the cerebellar hemisphere with superimposed mild diffuse symmetric cortically based diffusion signal abnormality throughout both cerebral hemispheres and to a lesser extent involving the basal ganglia. Findings remain concerning for areas of evolving acute/subacute infarct with possible superimposed global anoxic injury, noting that additional radiology such as sequela of seizure-like activity is also considered. Sequela of infectious or non infectious inflammatory cerebritis is also considered but felt less likely."    EEG with diffuse background suppression.    See "Cardiac arrest" for management.     Obstructive hydrocephalus  10/31/2024 MRI - "Marked effacement about the cerebellar vermis and hemispheres with effacement of the 4th ventricle, slight upward cerebellar tonsillar herniation, and crowding of the cerebellar tonsils at the foramen magnum. There is resultant upstream obstructive hydrocephalus involving the lateral and 3rd " "ventricles with probable transependymal flow of CSF."    - NSGY consulted - nothing to offer  - Goal Na > 150; sodium q6h  - Continue 2% hypertonic saline gtt    Seizure  Burst suppression secondary to anoxic brain injury  Continue HTS  Cont current reg    Vascular dementia  Holding home Aricept in the setting of acute illness    Pulmonary  Acute respiratory failure  Respiratory failure which is Acute.  he is not on home oxygen. Supplemental oxygen was provided and noted- Vent Mode: A/C  Oxygen Concentration (%):  [40] 40  Resp Rate Total:  [18 br/min] 18 br/min  Vt Set:  [480 mL] 480 mL  PEEP/CPAP:  [5 cmH20] 5 cmH20  Mean Airway Pressure:  [8.4 cmH20-9.3 cmH20] 8.4 cmH20    .   Signs/symptoms of respiratory failure include- tachypnea and respiratory distress. Contributing diagnoses includes -  cardiac arrest  Labs and images were reviewed. Patient Has recent ABG, which has been reviewed. Will treat underlying causes and adjust management of respiratory failure as follows-     Recent Labs     11/01/24  0403 11/02/24  0357 11/03/24  0515   PH 7.377 7.374 7.496*   PCO2 45.1* 52.5* 45.9*   PO2 132* 123* 116*   HCO3 26.5 30.6* 35.4*   POCSATURATED 99 99 99   BE 1 5* 12*         Cardiac/Vascular  * Cardiac arrest  Pst hypothermia  EEG with burst suppression  MRI with diffuse anoxic injury x2 scans  Family coming in today for prognostication and ongoing goals of care    Renal/  Urinary tract infection associated with catheterization of urinary tract  UA positive for nitrites, leukocyte esterase, WBC, and bacteria. Repeat U/A after catheter exchange still consistent with UTI.     - Continue meropenem 1 g IV q8h x 5 days per ID    GI  Transaminitis  Recent Labs     11/01/24  0004 11/02/24  0857 11/03/24  0021   BILITOT 0.2 0.2 0.2   ALKPHOS 77 85 85   * 114* 115*   ALT 55* 57* 61*       Stable. Continue to monitor with daily CMPs.           The patient is being Prophylaxed for:  Venous Thromboembolism with: " Chemical  Stress Ulcer with: H2B  Ventilator Pneumonia with: chlorhexidine oral care    Activity Orders            None          DNR    Rajan Romero MD  Neurocritical Care  [facility] - Neuro Critical Care      55     minutes of Critical care time was spent personally by me on the following activities: development of treatment plan with patient or surrogate and bedside caregivers, discussions with consultants, evaluation of patient's response to treatment, examination of patient, ordering and performing treatments and interventions, ordering and review of laboratory studies, ordering and review of radiographic studies, pulse oximetry, antibiotic titration if applicable, vasopressor titration if applicable, re-evaluation of patient's condition. This critical care time did not overlap with that of any other provider or involve time for any procedures. There is potential for acute life threatening neurological and or medical decompensation therefore will continue to monitor closely. Current critical conditions posing threat to organ systems, limb, or life include: see note

## 2024-11-03 NOTE — ASSESSMENT & PLAN NOTE
Recent Labs     11/01/24  0004 11/02/24  0857 11/03/24  0021   BILITOT 0.2 0.2 0.2   ALKPHOS 77 85 85   * 114* 115*   ALT 55* 57* 61*       Stable. Continue to monitor with daily CMPs.

## 2024-11-03 NOTE — PLAN OF CARE
[facility] Care Plan  POC reviewed with Irvin Nuñez Jr. and family at 1400. Family verbalized understanding. Questions and concerns addressed. See below and flowsheets for full assessment and VS info.     - Goals of Care. Patient tranistioned to comfort care measures per family decision. See MD notes regarding events and decision.  -  morphine infusion initiated. Currently infusing at 0.5 mg/h  - Fentanyl infusion and 2% hypertonics discontinued per order for transition  - Scopolamine patch applied  - PRN lorazepam given x1      Is this a stroke patient? no    Neuro:  Mary Coma Scale  Best Eye Response: 2-->(E2) to pain  Best Motor Response: 2-->(M2) extension to pain  Best Verbal Response: 1-->(V1) none  [place] Coma Scale Score: 5  Assessment Qualifiers: patient intubated  Pupil PERRLA: no     24 hr Temp:  [97.2 °F (36.2 °C)-99.3 °F (37.4 °C)]     CV:   Rhythm: normal sinus rhythm  BP goals:   SBP <  n/a comfort measures only  MAP >  n/a comfort measures only    Resp:      Vent Mode: A/C  Set Rate: 18 BPM  Oxygen Concentration (%): 40  Vt Set: 480 mL  PEEP/CPAP: 5 cmH20    Plan:  Patient extubated to room air for transition    GI/:     Diet/Nutrition Received: tube feeding  Last Bowel Movement: 10/28/24  Voiding Characteristics: suprapubic catheter    Intake/Output Summary (Last 24 hours) at 11/3/2024 1650  Last data filed at 11/3/2024 1601  Gross per 24 hour   Intake 2629.6 ml   Output 925 ml   Net 1704.6 ml     Unmeasured Output  Stool Occurrence: 0    Labs/Accuchecks:  Recent Labs   Lab 11/03/24  0021   WBC 7.24   RBC 3.92*   HGB 8.4*   HCT 27.7*         Recent Labs   Lab 11/03/24  0021 11/03/24  0526   * 154*   K 4.3  --    CO2 30*  --    *  --    BUN 14  --    CREATININE 0.6  --    ALKPHOS 85  --    ALT 61*  --    *  --    BILITOT 0.2  --       Recent Labs   Lab 10/30/24  0332 10/31/24  0005   INR 1.1 1.1   APTT 29.3  --       Recent Labs   Lab 10/30/24  2039 10/31/24  0885  11/02/24  0556   CPK 2724*   < > 1634*   TROPONINI 0.473*  --   --     < > = values in this interval not displayed.       Electrolytes: Contraindicated  Accuchecks: none    Gtts:      LDA/Wounds:    Hesham Risk Assessment  Sensory Perception: 1-->completely limited  Moisture: 3-->occasionally moist  Activity: 1-->bedfast  Mobility: 1-->completely immobile  Nutrition: 2-->probably inadequate  Friction and Shear: 2-->potential problem  Hesham Score: 10        Nurses Note -- 4 Eyes    Is there altered skin present?  yes     Please check the following boxes that apply:     [x] New Altered Skin Integrity was Present on Admit and Documented in LDA   [x] Wound Image Taken    Wound Care Consulted? Yes     Second RN/Staff Member:  LINDA Limon      Restraints: n/a       [facility]

## 2024-11-03 NOTE — ASSESSMENT & PLAN NOTE
Respiratory failure which is Acute.  he is not on home oxygen. Supplemental oxygen was provided and noted- Vent Mode: A/C  Oxygen Concentration (%):  [40] 40  Resp Rate Total:  [18 br/min] 18 br/min  Vt Set:  [480 mL] 480 mL  PEEP/CPAP:  [5 cmH20] 5 cmH20  Mean Airway Pressure:  [8.4 cmH20-9.3 cmH20] 8.4 cmH20    .   Signs/symptoms of respiratory failure include- tachypnea and respiratory distress. Contributing diagnoses includes -  cardiac arrest  Labs and images were reviewed. Patient Has recent ABG, which has been reviewed. Will treat underlying causes and adjust management of respiratory failure as follows-     Recent Labs     11/01/24  0403 11/02/24  0357 11/03/24  0515   PH 7.377 7.374 7.496*   PCO2 45.1* 52.5* 45.9*   PO2 132* 123* 116*   HCO3 26.5 30.6* 35.4*   POCSATURATED 99 99 99   BE 1 5* 12*

## 2024-11-04 NOTE — ASSESSMENT & PLAN NOTE
UA positive for nitrites, leukocyte esterase, WBC, and bacteria. Repeat U/A after catheter exchange still consistent with UTI.     Dc meds given comfort status

## 2024-11-04 NOTE — ASSESSMENT & PLAN NOTE
"- Continue Cosopt drops per Ophtho recs  - Optho recommending "comfort care" of left eye" please see note dated 10/31 for more details   "

## 2024-11-04 NOTE — ASSESSMENT & PLAN NOTE
Pst hypothermia  EEG with burst suppression  MRI with diffuse anoxic injury x2 scans  11/3 transitioned to comfort care, d/c to hospice    [employer] dose 1 and 2

## 2024-11-04 NOTE — PLAN OF CARE
[facility] - Hospice and Palliative Medicine  Discharge Final Note    Primary Care Provider: Alberto Lundberg Jr., MD    Expected Discharge Date: 11/6/2024    Final Discharge Note (most recent)       Final Note - 11/04/24 1517          Final Note    Assessment Type Final Discharge Note     Anticipated Discharge Disposition Hospice/Medical Facility     What phone number can be called within the next 1-3 days to see how you are doing after discharge? [phone] (P)         Post-Acute Status    Post-Acute Authorization Hospice (P)      Hospice Status Set-up Complete/Auth obtained (P)      Coverage PHN (P)      Discharge Delays None known at this time (P)                    Pt transferred to 3rd floor [facility]. Palliative Medicine is following. Pt has no further needs from case management.    PAYAM Christiansen, MILLICENTSW [facility]  [id number]

## 2024-11-04 NOTE — PLAN OF CARE
[facility] Care Plan    POC reviewed with Irvin Nuñez Jr. and family at 0206. Patient family verbalized understanding. Questions and concerns addressed. No acute events today. Pt progressing toward goals. Will continue to monitor. See below and flowsheets for full assessment and VS info.     -Comfort care        Is this a stroke patient?     Neuro:  [place] Coma Scale  Best Eye Response: 2-->(E2) to pain  Best Motor Response: 2-->(M2) extension to pain  Best Verbal Response: 1-->(V1) none  Mary Coma Scale Score: 5  Assessment Qualifiers: patient intubated  Pupil PERRLA: no     24 hr Temp:  [97.2 °F (36.2 °C)-98.8 °F (37.1 °C)]     CV:   Rhythm: normal sinus rhythm  BP goals:   SBP <   MAP >     Resp:      Vent Mode: A/C  Set Rate: 18 BPM  Oxygen Concentration (%): 40  Vt Set: 480 mL  PEEP/CPAP: 5 cmH20    Plan: N/A    GI/:     Diet/Nutrition Received: tube feeding  Last Bowel Movement: 10/28/24  Voiding Characteristics: suprapubic catheter    Intake/Output Summary (Last 24 hours) at 11/4/2024 0206  Last data filed at 11/4/2024 0202  Gross per 24 hour   Intake 1404.06 ml   Output 335 ml   Net 1069.06 ml     Unmeasured Output  Stool Occurrence: 0    Labs/Accuchecks:  Recent Labs   Lab 11/03/24  0021   WBC 7.24   RBC 3.92*   HGB 8.4*   HCT 27.7*         Recent Labs   Lab 11/03/24  0021 11/03/24  0526   * 154*   K 4.3  --    CO2 30*  --    *  --    BUN 14  --    CREATININE 0.6  --    ALKPHOS 85  --    ALT 61*  --    *  --    BILITOT 0.2  --       Recent Labs   Lab 10/30/24  0332 10/31/24  0005   INR 1.1 1.1   APTT 29.3  --       Recent Labs   Lab 10/30/24  2039 10/31/24  0850 11/02/24  0556   CPK 2724*   < > 1634*   TROPONINI 0.473*  --   --     < > = values in this interval not displayed.       Electrolytes:   Accuchecks: none    Gtts:   morphine  0-10 mg/hr Intravenous Continuous 2 mL/hr at 11/04/24 0202 2 mg/hr at 11/04/24 0202       LDA/Wounds:    Hesham Risk Assessment  Sensory  Perception: 1-->completely limited  Moisture: 3-->occasionally moist  Activity: 1-->bedfast  Mobility: 1-->completely immobile  Nutrition: 2-->probably inadequate  Friction and Shear: 2-->potential problem  Hesham Score: 10  Is your hesham score 12 or less?     Nurses Note -- 4 Eyes    Is there altered skin present?  No  Second RN/Staff Member:        Restraints:        WC

## 2024-11-04 NOTE — HPI
Irvin Nuñez is a 73 yo male with PMHx HTN, HLD, schizophrenia, and vascular dementia who was transferred from OSH ED to [facility] for seizure-like activity post cardiac arrest. Per family at OSH, patient has been having syncopal episodes for the past 4 months which have become much more frequent in the past 1-2 months. Today patient had another syncopal episode and collapsed at home. CPR was initiated by family and received 6 rounds of epi while being transported via [place]. Patient was intubated and ROSC achieved en route. CT head neg for acute intracranial processes. Echo showed EF 67%. EKG NSR. Lactic acid and trop elevated. UA positive for nitrites, leukocyte esterase, WBC, and bacteria. K 3.2. ABG with metabolic acidosis with partial respiratory compensation. While at OSH ED, patient had intermittent twitching of the head and eyes concerning for possible seizure like activity. He was kept on prop 30 for suppression of any possible seizure activity. He was transferred to [facility] for further workup and higher level of care.      Hospital Course by Event: 10/31/2024: Rewarming started at 4AM, achieved target temp at 7:58AM. EEG with diffusely suppressed background. MRI consistent with global anoxic injury and obstructive hydrocephalus. Made DNR after conversation with brother and sister at bedside (see ACP note.)  11/01/2024: Overnight patient required both cooling and Jessica hugger to maintain normothermia. Fent started due to tachypnea, tachycardia, vent dyssynchrony. Planning for family meeting Sunday.  11/02/2024: SASHA. [facility] ordered for overnight followed by family meeting tomorrow.   11/3: family meeting today for GOC and prognostication  11/4/2024 continue comfort care, d/c to inpatient hospice

## 2024-11-04 NOTE — PROGRESS NOTES
Discussed with Dr. Coleman.   Pt to be transferred to 3rd floor for DIP hospice level of care.     Nilo Aburto MD  Palliative Medicine   [facility]  [phone] (cell)

## 2024-11-04 NOTE — SUBJECTIVE & OBJECTIVE
Interval History:  unresponsive on comfort focused care    Review of Systems   Unable to perform ROS: Mental status change     Objective:     Vitals:  Temp: 98 °F (36.7 °C)  Pulse: (!) 119  Rhythm: normal sinus rhythm  BP: (!) 147/83  MAP (mmHg): 109  Resp: 11  SpO2: (!) 92 %    Temp  Min: 97 °F (36.1 °C)  Max: 98.7 °F (37.1 °C)  Pulse  Min: 79  Max: 124  BP  Min: 139/80  Max: 192/88  MAP (mmHg)  Min: 103  Max: 136  Resp  Min: 11  Max: 31  SpO2  Min: 88 %  Max: 96 %    11/03 0701 - 11/04 0700  In: 931.5 [I.V.:431.5]  Out: 700 [Urine:700]   Unmeasured Output  Stool Occurrence: 0        Physical Exam  Vitals and nursing note reviewed.   Constitutional:       Appearance: He is ill-appearing.   HENT:      Head: Normocephalic and atraumatic.   Cardiovascular:      Rate and Rhythm: Normal rate and regular rhythm.   Pulmonary:      Effort: No respiratory distress.      Comments: Shallow resp  Musculoskeletal:      Right lower leg: No edema.      Left lower leg: No edema.   Skin:     General: Skin is warm and dry.   Neurological:      Mental Status: He is unresponsive.      GCS: GCS eye subscore is 1. GCS verbal subscore is 1. GCS motor subscore is 2.      Comments: - Subtle R corneal intact  - No cough or gag  - Extensor LUE, triple flex LLE  - RUE/RLE no movement    obtunded        Medications:  Continuousmorphine, Last Rate: 2 mg/hr (11/04/24 0602)    Scheduledscopolamine, 1 patch, Q3 Days    PRNacetaminophen, 650 mg, Q6H PRN  bisacodyL, 10 mg, Daily PRN  haloperidol lactate, 2 mg, Q4H PRN  lorazepam, 1 mg, Q4H PRN  morphine, 0.5 mg, Q30 Min PRN  sodium chloride 0.9%, 10 mL, PRN      Today I personally reviewed pertinent medications, lines/drains/airways, imaging, cardiology results, laboratory results, microbiology results,     Diet  No diet orders on file  Palliative Exam  Advance Care Planning

## 2024-11-04 NOTE — CONSULTS
[facility]  Palliative Medicine  Consult Note    Patient Name: Irvin Nuñez Jr.  MRN: [id number]  Admission Date: 10/30/2024  Hospital Length of Stay: 5 days  Code Status: DNR   Attending Provider: Nilo Aburto MD  Consulting Provider: Nilo Aburto MD  Primary Care Physician: Alberto Lundberg Jr., MD  Principal Problem:Cardiac arrest    Patient information was obtained from relative(s), past medical records, and primary team.      Consults  Assessment/Plan:     Palliative Care  Comfort measures only status  Hospice Qualifying Diagnosis: Cardiac arrest and anoxic brain injury      Symptom Assessment:  Overall, patient continues to have ongoing clinical deterioration related to the terminal diagnosis      - Pain: uncontrolled  - Dyspnea: controlled  - Agitation: controlled  - Mentation: unresponsive    Current Medications:      Current Facility-Administered Medications:     acetaminophen suppository 650 mg, 650 mg, Rectal, Q6H PRN, Nilo Aburto MD    bisacodyL suppository 10 mg, 10 mg, Rectal, Daily PRN, Nilo Aburto MD    haloperidol lactate injection 2 mg, 2 mg, Intravenous, Q4H PRN, Nilo Aburto MD    LORazepam injection 1 mg, 1 mg, Intravenous, Q4H PRN, Soraida Dumont PA-C, 1 mg at 11/03/24 1358    morphine 100 mg (1 mg/mL) in NACL 100 mL infusion (TITRATING), 0-10 mg/hr, Intravenous, Continuous, Izabel Jones PA-C, Last Rate: 2 mL/hr at 11/04/24 0602, 2 mg/hr at 11/04/24 0602    morphine IV bolus from bag/infusion 0.5 mg, 0.5 mg, Intravenous, Q30 Min PRN, Izabel Jones PA-C, 0.5 mg at 11/04/24 0618    scopolamine 1.3-1.5 mg (1 mg over 3 days) 1 patch, 1 patch, Transdermal, Q3 Days, Soraida Dumont PA-C, 1 patch at 11/03/24 1358    sodium chloride 0.9% flush 10 mL, 10 mL, Intravenous, PRN, Maya Cruz PA-C      Family discussion updates:  Comfort focused care plan    Plan of care:  Transfer to the [facility]; comfort focused care plan    Follow  up plans:   - Symptomatic condition is not stabilized.   - Death is imminent within a short period of time as evidenced by clinical deterioration such as mottling of skin, respiratory status change, and level of consciousness which are not conducive to medical transport.  - Hospice is working diligently to develop and provide a plan for safe discharge to a lower level of care.       Advanced Directives::  Living Will: No  LaPOST: No  Do Not Resuscitate Status: Yes  Surrogate Decision maker / Medical Power of :   Maryse Beth  Sister  Emergency Contact  [phone]      Comfort care plan:  Dyspnea / Secretions  - O2 per nasal cannula as needed for comfort  - Morphine 2mg/hr infusion and 2 mg IV q1h PRN dyspnea or breakthrough pain  - Lorazepam 1 mg IV q1h PRN dyspnea refractory to opioids, anxiety  - Glycopyrrolate 0.2 mg IV q4h PRN secretions  - Frequent oral care  - Gentle repositioning of head to shift oropharyngeal secretions out of airway  - Yankauer suction at bedside  - Fan at bedside  - Avoid artificial hydration    Pain  - Opioids as above    Agitation / Delirium / Anxiety  - Gentle reorientation techniques, minimize environmental stimuli  - Discontinue laboratory draws and remove non-essential lines  - Treat for pain/dyspnea as above  - Lorazepam 1 mg IV q1h PRN anxiety, dyspnea refractory to opioids  - Haldol 2 mg IV q4h PRN agitation    Nutrition / Hydration  - Discontinue IV fluids and artificial nutrition  - Comfort feedings as desired and tolerated  - Simplify medication regimen by eliminating those medications that provide little to no benefit at end-of-life    Nausea  - Ondansetron 4 mg IV q6h PRN nausea/vomiting    Bowel Regimen  - Bisacodyl 10 mg suppository daily PRN constipation  - Will not prioritize at end-of-life    Fever  - Apply cold compresses  - Acetaminophen 650 mg suppository q4h PRN fever  - Fan at bedside          The pt will be transferred to the [facility] for end of life  care.    Subjective:     HPI:   Irvin Nuñez is a 75 yo male with PMHx HTN, HLD, schizophrenia, and vascular dementia who was transferred from [facility] ED to [facility] for seizure-like activity post cardiac arrest. Per family at OSH, patient has been having syncopal episodes for the past 4 months which have become much more frequent in the past 1-2 months. Today patient had another syncopal episode and collapsed at home. CPR was initiated by family and received 6 rounds of epi while being transported via [place]. Patient was intubated and ROSC achieved en route. CT head neg for acute intracranial processes. Echo showed EF 67%. EKG NSR. Lactic acid and trop elevated. UA positive for nitrites, leukocyte esterase, WBC, and bacteria. K 3.2. ABG with metabolic acidosis with partial respiratory compensation. While at OSH ED, patient had intermittent twitching of the head and eyes concerning for possible seizure like activity. He was kept on prop 30 for suppression of any possible seizure activity. He was transferred to [facility] for further workup and higher level of care.      Hospital Course by Event: 10/31/2024: Rewarming started at 4AM, achieved target temp at 7:58AM. EEG with diffusely suppressed background. MRI consistent with global anoxic injury and obstructive hydrocephalus. Made DNR after conversation with brother and sister at bedside (see ACP note.)  11/01/2024: Overnight patient required both cooling and Jessica hugger to maintain normothermia. Fent started due to tachypnea, tachycardia, vent dyssynchrony. Planning for family meeting Sunday.  11/02/2024: [facility]. [facility] ordered for overnight followed by family meeting tomorrow.   11/3: family meeting today for GOC and prognostication  11/4/2024 continue comfort care, d/c to inpatient hospice     Hospital Course:  No notes on file    Interval History:  unresponsive on comfort focused care    Review of Systems   Unable to perform ROS: Mental status change     Objective:      Vitals:  Temp: 98 °F (36.7 °C)  Pulse: (!) 119  Rhythm: normal sinus rhythm  BP: (!) 147/83  MAP (mmHg): 109  Resp: 11  SpO2: (!) 92 %    Temp  Min: 97 °F (36.1 °C)  Max: 98.7 °F (37.1 °C)  Pulse  Min: 79  Max: 124  BP  Min: 139/80  Max: 192/88  MAP (mmHg)  Min: 103  Max: 136  Resp  Min: 11  Max: 31  SpO2  Min: 88 %  Max: 96 %    11/03 0701 - 11/04 0700  In: 931.5 [I.V.:431.5]  Out: 700 [Urine:700]   Unmeasured Output  Stool Occurrence: 0        Physical Exam  Vitals and nursing note reviewed.   Constitutional:       Appearance: He is ill-appearing.   HENT:      Head: Normocephalic and atraumatic.   Cardiovascular:      Rate and Rhythm: Normal rate and regular rhythm.   Pulmonary:      Effort: No respiratory distress.      Comments: Shallow resp  Musculoskeletal:      Right lower leg: No edema.      Left lower leg: No edema.   Skin:     General: Skin is warm and dry.   Neurological:      Mental Status: He is unresponsive.      GCS: GCS eye subscore is 1. GCS verbal subscore is 1. GCS motor subscore is 2.      Comments: - Subtle R corneal intact  - No cough or gag  - Extensor LUE, triple flex LLE  - RUE/RLE no movement    obtunded        Medications:  Continuousmorphine, Last Rate: 2 mg/hr (11/04/24 0602)    Scheduledscopolamine, 1 patch, Q3 Days    PRNacetaminophen, 650 mg, Q6H PRN  bisacodyL, 10 mg, Daily PRN  haloperidol lactate, 2 mg, Q4H PRN  lorazepam, 1 mg, Q4H PRN  morphine, 0.5 mg, Q30 Min PRN  sodium chloride 0.9%, 10 mL, PRN      Today I personally reviewed pertinent medications, lines/drains/airways, imaging, cardiology results, laboratory results, microbiology results,     Diet  No diet orders on file  Palliative Exam  Advance Care Planning    Nilo Aburto MD  Palliative Medicine  [facility] - Hospice and Palliative Medicine

## 2024-11-04 NOTE — ASSESSMENT & PLAN NOTE
Hospice Qualifying Diagnosis: Cardiac arrest and anoxic brain injury      Symptom Assessment:  Overall, patient continues to have ongoing clinical deterioration related to the terminal diagnosis      - Pain: uncontrolled  - Dyspnea: controlled  - Agitation: controlled  - Mentation: unresponsive    Current Medications:      Current Facility-Administered Medications:     acetaminophen suppository 650 mg, 650 mg, Rectal, Q6H PRN, Nilo Aburto MD    bisacodyL suppository 10 mg, 10 mg, Rectal, Daily PRN, Nilo Aburto MD    haloperidol lactate injection 2 mg, 2 mg, Intravenous, Q4H PRN, Nilo Aburto MD    LORazepam injection 1 mg, 1 mg, Intravenous, Q4H PRN, Soraida Dumont PA-C, 1 mg at 11/03/24 1358    morphine 100 mg (1 mg/mL) in NACL 100 mL infusion (TITRATING), 0-10 mg/hr, Intravenous, Continuous, Izabel Jones PA-C, Last Rate: 2 mL/hr at 11/04/24 0602, 2 mg/hr at 11/04/24 0602    morphine IV bolus from bag/infusion 0.5 mg, 0.5 mg, Intravenous, Q30 Min PRN, Izabel Jones PA-C, 0.5 mg at 11/04/24 0618    scopolamine 1.3-1.5 mg (1 mg over 3 days) 1 patch, 1 patch, Transdermal, Q3 Days, Soraida Dumont PA-C, 1 patch at 11/03/24 1358    sodium chloride 0.9% flush 10 mL, 10 mL, Intravenous, PRN, Maya Cruz PA-C      Family discussion updates:  Comfort focused care plan    Plan of care:  Transfer to the [facility]; comfort focused care plan    Follow up plans:   - Symptomatic condition is not stabilized.   - Death is imminent within a short period of time as evidenced by clinical deterioration such as mottling of skin, respiratory status change, and level of consciousness which are not conducive to medical transport.  - Hospice is working diligently to develop and provide a plan for safe discharge to a lower level of care.       Advanced Directives::  Living Will: No  LaPOST: No  Do Not Resuscitate Status: Yes  Surrogate Decision maker / Medical Power of :   Maryse  Beth Marcial  Emergency Contact  [phone]      Comfort care plan:  Dyspnea / Secretions  - O2 per nasal cannula as needed for comfort  - Morphine 2mg/hr infusion and 2 mg IV q1h PRN dyspnea or breakthrough pain  - Lorazepam 1 mg IV q1h PRN dyspnea refractory to opioids, anxiety  - Glycopyrrolate 0.2 mg IV q4h PRN secretions  - Frequent oral care  - Gentle repositioning of head to shift oropharyngeal secretions out of airway  - Yankauer suction at bedside  - Fan at bedside  - Avoid artificial hydration    Pain  - Opioids as above    Agitation / Delirium / Anxiety  - Gentle reorientation techniques, minimize environmental stimuli  - Discontinue laboratory draws and remove non-essential lines  - Treat for pain/dyspnea as above  - Lorazepam 1 mg IV q1h PRN anxiety, dyspnea refractory to opioids  - Haldol 2 mg IV q4h PRN agitation    Nutrition / Hydration  - Discontinue IV fluids and artificial nutrition  - Comfort feedings as desired and tolerated  - Simplify medication regimen by eliminating those medications that provide little to no benefit at end-of-life    Nausea  - Ondansetron 4 mg IV q6h PRN nausea/vomiting    Bowel Regimen  - Bisacodyl 10 mg suppository daily PRN constipation  - Will not prioritize at end-of-life    Fever  - Apply cold compresses  - Acetaminophen 650 mg suppository q4h PRN fever  - Fan at bedside

## 2024-11-04 NOTE — ASSESSMENT & PLAN NOTE
"10/31/2024 MRI - "Marked effacement about the cerebellar vermis and hemispheres with effacement of the 4th ventricle, slight upward cerebellar tonsillar herniation, and crowding of the cerebellar tonsils at the foramen magnum. There is resultant upstream obstructive hydrocephalus involving the lateral and 3rd ventricles with probable transependymal flow of CSF."    - NSGY consulted - nothing to offer    "

## 2024-11-04 NOTE — ASSESSMENT & PLAN NOTE
Multiple penile ulcerations on glans. S/p IM Penicillin x 1 on 10/30 (Treponema negative but ID curbside recommended treating as it is possible patient had not yet seroconverted.)

## 2024-11-04 NOTE — ASSESSMENT & PLAN NOTE
Patient on alfuzosin, finasteride, and oxybutynin at home; holding in setting of acute illness with chronic suprapubic catheter in place. Outside catheter changed out on 10/30.    -continue catheter given comfort status

## 2024-11-04 NOTE — DISCHARGE SUMMARY
[facility]  Neurocritical Care  Discharge Summary    Admit Date: 10/30/2024    Service Date: 11/04/2024    Discharge Date: 11/4/2024    Length of Stay: 5    Final Active Diagnoses:    Diagnosis Date Noted POA    PRINCIPAL PROBLEM:  Cardiac arrest [I46.9] 10/29/2024 Yes    Comfort measures only status [Z51.5] 11/03/2024 Not Applicable    Transaminitis [R74.01] 10/31/2024 Yes    Obstructive hydrocephalus [G91.1] 10/31/2024 No    Brain hypoxic injury [G93.1] 10/31/2024 Yes    Glaucoma [H40.9] 10/31/2024 No    ACP (advance care planning) [Z71.89] 10/31/2024 Not Applicable    Seizure [R56.9] 10/30/2024 Yes    Penile lesion [N48.9] 10/30/2024 Yes    Acute respiratory failure [J96.00] 10/30/2024 Yes    Bifascicular block [I45.2] 10/30/2024 Yes    Urinary tract infection associated with catheterization of urinary tract [T83.511A, N39.0] 09/15/2024 Yes    Chronic suprapubic catheter [Z93.59] 09/15/2024 Not Applicable    Microcytic anemia [D50.9] 01/20/2024 Yes    Vascular dementia [F01.50] 09/22/2023 Yes    Essential hypertension [I10] 09/29/2015 Yes    Hyperlipidemia [E78.5] 09/29/2015 Yes      Problems Resolved During this Admission:    Diagnosis Date Noted Date Resolved POA    Metabolic acidosis [E87.20] 10/30/2024 11/01/2024 Yes    Elevated troponin [R79.89] 10/30/2024 11/01/2024 Yes    Syncope [R55] 09/22/2023 10/30/2024 Yes      History of Present Illness: Irvin Nuñez is a 75 yo male with PMHx HTN, HLD, schizophrenia, and vascular dementia who was [place] [facility] [place] [facility] post cardiac arrest. Per family at OSH, patient has been having syncopal episodes for the past 4 months which have become much more frequent in the past 1-2 months. Today patient had another syncopal episode and collapsed at home. CPR was initiated by family and received 6 rounds of epi while being transported via [place]. Patient was intubated and ROSC achieved en route. CT head neg for  acute intracranial processes. Echo showed EF 67%. EKG NSR. Lactic acid and trop elevated. UA positive for nitrites, leukocyte esterase, WBC, and bacteria. K 3.2. ABG with metabolic acidosis with partial respiratory compensation. While at OSH ED, patient had intermittent twitching of the head and eyes concerning for possible seizure like activity. He was kept on prop 30 for suppression of any possible seizure activity. He was [place] to [facility] [place] workup and higher level of care.     Hospital Course by Event: 10/31/2024: Rewarming started at 4AM, achieved target temp at 7:58AM. EEG with diffusely suppressed background. MRI consistent with global anoxic injury and obstructive hydrocephalus. Made DNR after conversation with brother and sister at bedside (see ACP note.)  11/01/2024: Overnight patient required both cooling and Jessica hugger to maintain normothermia. Fent started due to tachypnea, tachycardia, vent dyssynchrony. Planning for family meeting Sunday.  11/02/2024: [facility]. [facility] ordered for overnight followed by family meeting tomorrow.   11/3: family meeting today for GOC and prognostication  11/4/2024 continue comfort care, d/c to inpatient hospice       Review of Systems   Reason unable to perform ROS: comfort care only.     Vitals:    11/04/24 1205 11/04/24 1305 11/04/24 1405 11/04/24 1441   BP: (!) 153/75 139/80 (!) 148/92 (!) 147/83   Patient Position:    Lying   Pulse: (!) 124 (!) 122 (!) 121 (!) 119   Resp: 11 11 13 11   Temp:    98 °F (36.7 °C)   TempSrc:    Axillary   SpO2: (!) 89% (!) 91% (!) 92%    Weight:       Height:          Physical Exam   Constitutional:   Patient comfortably resting in bed   Full exam not performed 2/2 to comfort care          Hospital Course by Problem:   * Cardiac arrest  Pst hypothermia  EEG with burst suppression  MRI with diffuse anoxic injury x2 scans  11/3 transitioned to comfort care, d/c to hospice     Comfort measures only status  D/c to hospice    ACP (advance  "care planning)  See ACP note dated 10/31, patient is DNR.  Comfort measures only    Glaucoma  - Continue Cosopt drops per Ophtho recs  - Optho recommending "comfort care" of left eye" please see note dated 10/31 for more details     Brain hypoxic injury  10/31/2024 MRI - "Focal cortically based diffusion restriction about the right frontal lobe, left greater than right occipital lobes, and involving the cerebellar hemisphere with superimposed mild diffuse symmetric cortically based diffusion signal abnormality throughout both cerebral hemispheres and to a lesser extent involving the basal ganglia. Findings remain concerning for areas of evolving acute/subacute infarct with possible superimposed global anoxic injury, noting that additional radiology such as sequela of seizure-like activity is also considered. Sequela of infectious or non infectious inflammatory cerebritis is also considered but felt less likely."    EEG with diffuse background suppression.    See "Cardiac arrest" for management.     Obstructive hydrocephalus  10/31/2024 MRI - "Marked effacement about the cerebellar vermis and hemispheres with effacement of the 4th ventricle, slight upward cerebellar tonsillar herniation, and crowding of the cerebellar tonsils at the foramen magnum. There is resultant upstream obstructive hydrocephalus involving the lateral and 3rd ventricles with probable transependymal flow of CSF."    - NSGY consulted - nothing to offer      Transaminitis  Recent Labs     11/01/24  0004 11/02/24  0857 11/03/24  0021   BILITOT 0.2 0.2 0.2   ALKPHOS 77 85 85   * 114* 115*   ALT 55* 57* 61*       Stable. Continue to monitor with daily CMPs.     Bifascicular block  - Cardiology consulted, appreciate recs  - Monitor on telemetry  - Avoid beta-blockers      Penile lesion  Multiple penile ulcerations on glans. S/p IM Penicillin x 1 on 10/30 (Treponema negative but ID curbside recommended treating as it is possible patient had not " yet seroconverted.)         Acute respiratory failure      Extubated to comfort care 11/3      Seizure  Burst suppression secondary to anoxic brain injury      Chronic suprapubic catheter  Patient on alfuzosin, finasteride, and oxybutynin at home; holding in setting of acute illness with chronic suprapubic catheter in place. Outside catheter changed out on 10/30.    -continue catheter given comfort status     Urinary tract infection associated with catheterization of urinary tract  UA positive for nitrites, leukocyte esterase, WBC, and bacteria. Repeat U/A after catheter exchange still consistent with UTI.     Dc meds given comfort status    Microcytic anemia  Dc labs given comfort status    Vascular dementia  Holding home Aricept in the setting of acute illness    Hyperlipidemia  Dc meds given comfort status    Essential hypertension  Dc meds given comfort status        Goals of Care Treatment Preferences:  Code Status: DNR      Significant Results:  Imaging:  MRI Brain Without Contrast    Result Date: 11/3/2024  EXAMINATION:  MRI BRAIN WITHOUT CONTRAST    CLINICAL HISTORY:  Stroke, follow up;s/p cardiac arrest;    TECHNIQUE:  Multiplanar multisequence MR imaging of the brain was performed without intravenous contrast.    COMPARISON:  MRI brain 10/31/2024.    FINDINGS:  Similar appearance of diffuse lesion restriction throughout the cortex of the bilateral cerebral hemispheres involving the basal ganglia with additional more prominent areas of cortical based diffusion restriction within the right parasagittal frontal lobe, left medial occipital lobe, right occipital lobe, cerebellar vermis and bilateral cerebellar hemispheres.  There is similar associated surrounding T2/FLAIR signal hyperintensity as well as mild focal sulcal effacement.  Specifically there is continued sulcal effacement throughout the posterior fossa involving the cerebellar hemispheres with effacement of the 4th ventricle and similar appearance  of slight upward transtentorial displacement and crowding of the cerebellar tonsils at the level of the foramen magnum.    Similar appearance of cortically based susceptibility artifact of the right parasagittal frontal lobe and left medial occipital lobe, possibly representing sequelae of laminar necrosis or petechial hemorrhage.  Few additional foci of susceptibility artifact within the left frontal lobe, favored represent remote microhemorrhage.    Stable enlargement of the 3rd ventricle measuring 1.9 cm in diameter and bifrontal ventricular dilation of the lateral ventricles measuring 5.8 cm in diameter (previously 1.9 cm and 5.8 cm respectively), consistent with hydrocephalus.    No new extra-axial blood or fluid collection.    Bone marrow signal intensity is normal.  The paranasal sinuses and mastoid air cells are essentially clear.  Partially visualized orogastric tube.  Heterogeneous appearance of the left orbit with peripheral T1 hyperintense/T2 hypointense signal, possibly representing sequelae of vitreous hemorrhage.          MRI Brain Without Contrast    Result Date: 10/31/2024  EXAMINATION:  MRI BRAIN WITHOUT CONTRAST    CLINICAL HISTORY:  Mental status change, unknown cause;.    TECHNIQUE:  Multiplanar multisequence MR imaging of the brain was performed without contrast.    COMPARISON:  CT head without contrast 10/29/2024, MRI brain without contrast 09/22/2023    FINDINGS:  Exam degraded by patient motion artifact.    Intracranial compartment:    Scattered areas of cortically based diffusion restriction about the right parasagittal frontal lobe (series 6, image 46), left medial occipital lobe (series 6, image 40), right occipital lobe (series 6, image 35), as well as diffusely involving the cerebellar vermis and cerebellar hemispheres (series 6, image 32).  Associated surrounding FLAIR signal hyperintensity as well as mild focal sulcal effacement.  More specifically, there is localized sulcal  effacement diffusely throughout the posterior fossa and involving the cerebellar hemispheres (series 8, image 7), with effacement of the 4th ventricle and slight upward transtentorial displacement (series 5, image 14).  Additionally, the cerebellar tonsils are crowded at the level of the foramen magnum.    Cortically based susceptibility artifact about the right parasagittal frontal lobe (series 11, image 66) and the left medial occipital lobe, which may represent sequela of laminar necrosis or petechial hemorrhage.    Interval enlargement of the lateral and 3rd ventricles with diffuse ballooning of the frontal horns and increased conspicuity of surrounding FLAIR signal hyperintensity about the periventricular margins.  For reference, the bifrontal ventricular measurement is 5.8 cm, and the 3rd ventricle measures 1.9 cm.  There is temporal horn flaring (series 8, image 10).    No new or enlarging extra-axial blood or fluid collections.  No parenchymal mass.  Additional mild diffuse and symmetric cortically based diffusion signal abnormality throughout both cerebral hemispheres (for example axial series 6, image 43), as well as involving the basal ganglia (series 6, image 38).  Additional few scattered punctate foci susceptibility artifact about the left frontal lobe, which may represent sequela of remote microhemorrhage.    Normal vascular flow voids are preserved.    Skull/extracranial contents (limited evaluation): Bone marrow signal intensity is normal.    Partially visualized orogastric tube with layering fluid in the nasopharynx.  Patchy mucosal thickening involving the paranasal sinuses.  Mastoid air cells are essentially clear.    Heterogeneous appearance of the left globe with peripheral T1 hyperintense/T2 hypointense signal, which may represent sequela of vitreous hemorrhage.    Cardiology:  Echo    Result Date: 11/1/2024    Left Ventricle: The left ventricle is normal in size. Normal wall   thickness.  There is concentric remodeling. Normal wall motion. There is   normal systolic function with a visually estimated ejection fraction of 65   - 70%. Ejection fraction is approximately 68%. There is normal diastolic   function.    Right Ventricle: Normal right ventricular cavity size. Wall thickness   is normal. Systolic function is borderline low.    IVC/SVC: Normal venous pressure at 3 mmHg.          Microbiology:  Microbiology Results (last 7 days)       Procedure Component Value Units Date/Time    Blood culture [[id number]] Collected: 10/30/24 1235    Order Status: Completed Specimen: Blood from Peripheral, Hand, Left Updated: 11/04/24 1212     Blood Culture, Routine No growth after 5 days.    Narrative:      Draw sample # 2 from separate site    Blood culture [[id number]] Collected: 10/30/24 1240    Order Status: Completed Specimen: Blood from Peripheral, Hand, Right Updated: 11/04/24 1212     Blood Culture, Routine No growth after 5 days.    Narrative:      Draw sample # 2 from separate site    Culture, Respiratory with Gram Stain [[id number]] Collected: 10/30/24 0530    Order Status: Completed Specimen: Respiratory from Endotracheal Aspirate Updated: 11/01/24 1017     Respiratory Culture Normal respiratory hal      No S aureus or Pseudomonas isolated.     Gram Stain (Respiratory) Few WBC's     Gram Stain (Respiratory) Rare Gram positive cocci    Urine culture [[id number]] Collected: 10/30/24 1255    Order Status: Completed Specimen: Urine from No method given Updated: 10/31/24 2325     Urine Culture, Routine No growth    Narrative:      add on CXURN per Kathie Garvey MD  10/30/2024  17:03     Urine culture [[id number]]     Order Status: Completed Specimen: Urine, Supra Pubic               Laboratory:  Lab Results   Component Value Date    HGBA1C 5.2 10/31/2024    CHOL 176 01/16/2024    HDL 64 01/16/2024    LDLCALC 95.2 01/16/2024    TRIG 84 01/16/2024    TSH 1.950 09/18/2024       Pending Results:  none    Consultations:  IP CONSULT TO CARDIOLOGY  IP CONSULT TO REGISTERED DIETITIAN/NUTRITIONIST  IP CONSULT TO OPHTHALMOLOGY  IP CONSULT TO INFECTIOUS DISEASES  WOUND CARE CONSULT  IP CONSULT TO NEUROSURGERY  IP CONSULT TO PALLIATIVE CARE    Procedures:   cEEG    Medications:   Comfort meds per hospice     Medication List        STOP taking these medications      alfuzosin 10 mg Tb24  Commonly known as: UROXATRAL     amLODIPine 10 MG tablet  Commonly known as: NORVASC     donepeziL 10 MG tablet  Commonly known as: ARICEPT     ferrous gluconate 324 MG tablet  Commonly known as: FERGON     finasteride 5 mg tablet  Commonly known as: PROSCAR     FLUoxetine 20 MG capsule     oxybutynin 5 MG Tr24  Commonly known as: DITROPAN-XL     pravastatin 40 MG tablet  Commonly known as: PRAVACHOL     traMADoL 50 mg tablet  Commonly known as: ULTRAM              Disposition:   Consultations were held with the family regarding the patient's expected poor prognosis. At the direction of the family, the patient was extubated  and measures to ensure the comfort of the patient including, but not limited to, morphine as needed for pain and air hunger as well as benzodiazepines as needed for agitation.  Discharged to inpatient hospice     This discharge took less than 30 minutes to complete.    Julee Bunn PA-C  Neurocritical Care  [facility]

## 2024-11-05 NOTE — PLAN OF CARE
Problem: Adult Inpatient Plan of Care  Goal: Plan of Care Review  Outcome: Progressing  Goal: Patient-Specific Goal (Individualized)  Outcome: Progressing  Goal: Absence of Hospital-Acquired Illness or Injury  Outcome: Progressing  Goal: Optimal Comfort and Wellbeing  Outcome: Progressing  Goal: Readiness for Transition of Care  Outcome: Progressing     Problem: Infection  Goal: Absence of Infection Signs and Symptoms  Outcome: Progressing     Problem: Wound  Goal: Optimal Coping  Outcome: Progressing  Goal: Optimal Functional Ability  Outcome: Progressing  Goal: Absence of Infection Signs and Symptoms  Outcome: Progressing  Goal: Improved Oral Intake  Outcome: Progressing  Goal: Optimal Pain Control and Function  Outcome: Progressing  Goal: Skin Health and Integrity  Outcome: Progressing  Goal: Optimal Wound Healing  Outcome: Progressing     Problem: Skin Injury Risk Increased  Goal: Skin Health and Integrity  Outcome: Progressing     Problem: Fall Injury Risk  Goal: Absence of Fall and Fall-Related Injury  Outcome: Progressing     Problem: Palliative Care  Goal: Enhanced Quality of Life  Outcome: Progressing     Problem: Coping Ineffective  Goal: Effective Coping  Outcome: Progressing     Problem: End-of-Life Care  Goal: Comfort, Peace and Preserved Dignity  Outcome: Progressing     Problem: Pain Acute  Goal: Optimal Pain Control and Function  Outcome: Progressing

## 2024-11-05 NOTE — PLAN OF CARE
Problem: Palliative Care  Goal: Enhanced Quality of Life  Outcome: Progressing     Problem: Coping Ineffective  Goal: Effective Coping  Outcome: Progressing     Problem: End-of-Life Care  Goal: Comfort, Peace and Preserved Dignity  Outcome: Progressing

## 2024-11-05 NOTE — PLAN OF CARE
Problem: Palliative Care  Goal: Enhanced Quality of Life  11/5/2024 1745 by Carmen Hutchins RN  Outcome: Progressing  11/5/2024 1742 by Carmen Hutchins RN  Outcome: Progressing     Problem: Coping Ineffective  Goal: Effective Coping  Outcome: Progressing     Problem: End-of-Life Care  Goal: Comfort, Peace and Preserved Dignity  11/5/2024 1745 by Carmen Hutchins RN  Outcome: Progressing  11/5/2024 1742 by Carmen Hutchins RN  Outcome: Progressing     Problem: Pain Acute  Goal: Optimal Pain Control and Function  Outcome: Progressing

## 2024-11-05 NOTE — NURSING
12:45 pt's sister, Maryse, called and was updated.  States that her car is not working so will come to hospital tomorrow; and if we have bad news about the patient, DO NOT CALL her (Maryse) because she cannot handle it, but DO CALL  GLADYS RANGEL (pt's nephew) [phone]. Maryse states that the patient likes cowboy movies and sit coms.    13:05 pt supine in bed with eyes open.  Staring at ceiling.  Does not track, no follows commands.  RR 8, deep with 5-10 second pauses observed.  Morphine gtt infusing at 2mg/hr.  No moaning, no facial grimacing noted.  No distress noted at this time.  Will cont to monitor.  Tv on.

## 2024-11-05 NOTE — ASSESSMENT & PLAN NOTE
Hospice Qualifying Diagnosis: Cardiac arrest and anoxic brain injury      Symptom Assessment:  Overall, patient continues to have ongoing clinical deterioration related to the terminal diagnosis      - Pain: uncontrolled  - Dyspnea: controlled  - Agitation: controlled  - Mentation: unresponsive    Current Medications:      Current Facility-Administered Medications:     acetaminophen suppository 650 mg, 650 mg, Rectal, Q6H PRN, Nilo Aburto MD    bisacodyL suppository 10 mg, 10 mg, Rectal, Daily PRN, Nilo Aburto MD    haloperidol lactate injection 2 mg, 2 mg, Intravenous, Q4H PRN, Nilo Aburto MD    LORazepam injection 1 mg, 1 mg, Intravenous, Q4H PRN, Soraida Dumont PA-C, 1 mg at 11/03/24 1358    morphine 100 mg (1 mg/mL) in NACL 100 mL infusion (TITRATING), 0-10 mg/hr, Intravenous, Continuous, Izabel Jones PA-C, Last Rate: 2 mL/hr at 11/05/24 1305, 2 mg/hr at 11/05/24 1305    morphine IV bolus from bag/infusion 2 mg, 2 mg, Intravenous, Q30 Min PRN, Nilo Aburto MD    scopolamine 1.3-1.5 mg (1 mg over 3 days) 1 patch, 1 patch, Transdermal, Q3 Days, Soraida Dumont PA-C, 1 patch at 11/03/24 1358    sodium chloride 0.9% flush 10 mL, 10 mL, Intravenous, PRN, Maya Cruz PA-C      Family discussion updates:  Comfort focused care plan    Plan of care:  Transfer to the HPU; comfort focused care plan    Follow up plans:   - Symptomatic condition is not stabilized.   - Death is imminent within a short period of time as evidenced by clinical deterioration such as mottling of skin, respiratory status change, and level of consciousness which are not conducive to medical transport.  - Hospice is working diligently to develop and provide a plan for safe discharge to a lower level of care.       Advanced Directives::  Living Will: No  LaPOST: No  Do Not Resuscitate Status: Yes  Surrogate Decision maker / Medical Power of :   Maryse Campos  Sister  Emergency  Contact  [phone]      Comfort care plan:  Dyspnea / Secretions  - O2 per nasal cannula as needed for comfort  - Morphine 2mg/hr infusion and 2 mg IV q1h PRN dyspnea or breakthrough pain  - Lorazepam 1 mg IV q1h PRN dyspnea refractory to opioids, anxiety  - Glycopyrrolate 0.2 mg IV q4h PRN secretions  - Frequent oral care  - Gentle repositioning of head to shift oropharyngeal secretions out of airway  - Yankauer suction at bedside  - Fan at bedside  - Avoid artificial hydration    Pain  - Opioids as above    Agitation / Delirium / Anxiety  - Gentle reorientation techniques, minimize environmental stimuli  - Discontinue laboratory draws and remove non-essential lines  - Treat for pain/dyspnea as above  - Lorazepam 1 mg IV q1h PRN anxiety, dyspnea refractory to opioids  - Haldol 2 mg IV q4h PRN agitation    Nutrition / Hydration  - Discontinue IV fluids and artificial nutrition  - Comfort feedings as desired and tolerated  - Simplify medication regimen by eliminating those medications that provide little to no benefit at end-of-life    Nausea  - Ondansetron 4 mg IV q6h PRN nausea/vomiting    Bowel Regimen  - Bisacodyl 10 mg suppository daily PRN constipation  - Will not prioritize at end-of-life    Fever  - Apply cold compresses  - Acetaminophen 650 mg suppository q4h PRN fever  - Fan at bedside

## 2024-11-05 NOTE — NURSING
Pt arrive to unit via hospital accompanied by RN. Pt in bed with eyes open, non verbal. Resp even and non labored. BBS diminished. BS active. Suprapubic in place draininf yellow concentrated urine. Pt is bedbound and requires total asst with all ADLs. Skin is clean dry and intact. Four eyes complete with Sheng RN. Morphine drip in place at 2 mg/hr. Nurse will continue to monitor pt.     Paramedian Forehead Flap Text: A decision was made to reconstruct the defect utilizing an interpolation axial flap and a staged reconstruction.  A telfa template was made of the defect.  This telfa template was then used to outline the paramedian forehead pedicle flap.  The donor area for the pedicle flap was then injected with anesthesia.  The flap was excised through the skin and subcutaneous tissue down to the layer of the underlying musculature.  The pedicle flap was carefully excised within this deep plane to maintain its blood supply.  The edges of the donor site were undermined.   The donor site was closed in a primary fashion.  The pedicle was then rotated into position and sutured.  Once the tube was sutured into place, adequate blood supply was confirmed with blanching and refill.  The pedicle was then wrapped with xeroform gauze and dressed appropriately with a telfa and gauze bandage to ensure continued blood supply and protect the attached pedicle.

## 2024-11-05 NOTE — SUBJECTIVE & OBJECTIVE
Interval History:  unresponsive; remains on morphine infusion at 2 mg/hr    20 second periods of apnea    No family at the bedside    Review of Systems   Unable to perform ROS: Mental status change     Objective:     Vitals:  Temp: 96 °F (35.6 °C)  Pulse: (!) 113  BP: 135/74  MAP (mmHg): 96  Resp: (!) 8    Temp  Min: 96 °F (35.6 °C)  Max: 98 °F (36.7 °C)  Pulse  Min: 113  Max: 121  BP  Min: 135/74  Max: 169/86  MAP (mmHg)  Min: 96  Max: 117  Resp  Min: 8  Max: 14  SpO2  Min: 87 %  Max: 92 %    11/04 0701 - 11/05 0700  In: -   Out: 410 [Urine:410]   Unmeasured Output  Urine Occurrence: 3  Stool Occurrence: 0        Physical Exam  Vitals and nursing note reviewed.   Constitutional:       Appearance: He is ill-appearing.   HENT:      Head: Normocephalic and atraumatic.   Cardiovascular:      Rate and Rhythm: Normal rate and regular rhythm.   Pulmonary:      Effort: No respiratory distress.      Comments: Shallow resp  Apneic periods  Musculoskeletal:      Right lower leg: No edema.      Left lower leg: No edema.   Skin:     General: Skin is warm and dry.      Capillary Refill: Capillary refill takes less than 2 seconds.   Neurological:      Mental Status: He is unresponsive.      GCS: GCS eye subscore is 1. GCS verbal subscore is 1. GCS motor subscore is 2.      Comments: obtunded        Medications:  Continuousmorphine, Last Rate: 2 mg/hr (11/05/24 1305)    Scheduledscopolamine, 1 patch, Q3 Days    PRNacetaminophen, 650 mg, Q6H PRN  bisacodyL, 10 mg, Daily PRN  haloperidol lactate, 2 mg, Q4H PRN  lorazepam, 1 mg, Q4H PRN  morphine, 2 mg, Q30 Min PRN  sodium chloride 0.9%, 10 mL, PRN      Today I personally reviewed pertinent medications, lines/drains/airways, imaging, cardiology results, laboratory results, microbiology results,     Diet  No diet orders on file  Palliative Exam  Advance Care Planning

## 2024-11-05 NOTE — PROGRESS NOTES
[facility]  Palliative Medicine  Progress Note    Patient Name: Irvin Nuñez Jr.  MRN: [id number]  Admission Date: 10/30/2024  Hospital Length of Stay: 6 days  Code Status: DNR   Attending Provider: Nilo Aburto MD  Consulting Provider: Nilo Aburto MD  Primary Care Physician: Alberto Lundberg Jr., MD  Principal Problem:Cardiac arrest    Patient information was obtained from  nursing .      Assessment/Plan:     Palliative Care  Comfort measures only status  Hospice Qualifying Diagnosis: Cardiac arrest and anoxic brain injury      Symptom Assessment:  Overall, patient continues to have ongoing clinical deterioration related to the terminal diagnosis      - Pain: uncontrolled  - Dyspnea: controlled  - Agitation: controlled  - Mentation: unresponsive    Current Medications:      Current Facility-Administered Medications:     acetaminophen suppository 650 mg, 650 mg, Rectal, Q6H PRN, Nilo Aburto MD    bisacodyL suppository 10 mg, 10 mg, Rectal, Daily PRN, Nilo Aburto MD    haloperidol lactate injection 2 mg, 2 mg, Intravenous, Q4H PRN, Nilo Aburto MD    LORazepam injection 1 mg, 1 mg, Intravenous, Q4H PRN, Soraida Dumont PA-C, 1 mg at 11/03/24 1358    morphine 100 mg (1 mg/mL) in NACL 100 mL infusion (TITRATING), 0-10 mg/hr, Intravenous, Continuous, Izabel Jones PA-C, Last Rate: 2 mL/hr at 11/05/24 1305, 2 mg/hr at 11/05/24 1305    morphine IV bolus from bag/infusion 2 mg, 2 mg, Intravenous, Q30 Min PRN, Nilo Aburto MD    scopolamine 1.3-1.5 mg (1 mg over 3 days) 1 patch, 1 patch, Transdermal, Q3 Days, Soraida Dumont PA-C, 1 patch at 11/03/24 1358    sodium chloride 0.9% flush 10 mL, 10 mL, Intravenous, PRN, Maya Cruz PA-C      Family discussion updates:  Comfort focused care plan    Plan of care:  Transfer to the [facility]; comfort focused care plan    Follow up plans:   - Symptomatic condition is not stabilized.   - Death is imminent within  a short period of time as evidenced by clinical deterioration such as mottling of skin, respiratory status change, and level of consciousness which are not conducive to medical transport.  - Hospice is working diligently to develop and provide a plan for safe discharge to a lower level of care.       Advanced Directives::  Living Will: No  LaPOST: No  Do Not Resuscitate Status: Yes  Surrogate Decision maker / Medical Power of :   Maryse Campos  Sister  Emergency Contact  [phone]      Comfort care plan:  Dyspnea / Secretions  - O2 per nasal cannula as needed for comfort  - Morphine 2mg/hr infusion and 2 mg IV q1h PRN dyspnea or breakthrough pain  - Lorazepam 1 mg IV q1h PRN dyspnea refractory to opioids, anxiety  - Glycopyrrolate 0.2 mg IV q4h PRN secretions  - Frequent oral care  - Gentle repositioning of head to shift oropharyngeal secretions out of airway  - Yankauer suction at bedside  - Fan at bedside  - Avoid artificial hydration    Pain  - Opioids as above    Agitation / Delirium / Anxiety  - Gentle reorientation techniques, minimize environmental stimuli  - Discontinue laboratory draws and remove non-essential lines  - Treat for pain/dyspnea as above  - Lorazepam 1 mg IV q1h PRN anxiety, dyspnea refractory to opioids  - Haldol 2 mg IV q4h PRN agitation    Nutrition / Hydration  - Discontinue IV fluids and artificial nutrition  - Comfort feedings as desired and tolerated  - Simplify medication regimen by eliminating those medications that provide little to no benefit at end-of-life    Nausea  - Ondansetron 4 mg IV q6h PRN nausea/vomiting    Bowel Regimen  - Bisacodyl 10 mg suppository daily PRN constipation  - Will not prioritize at end-of-life    Fever  - Apply cold compresses  - Acetaminophen 650 mg suppository q4h PRN fever  - Fan at bedside        Subjective:     Chief Complaint: No chief complaint on file.      HPI:   Irvin Nuñez is a 75 yo male with PMHx HTN, HLD, schizophrenia, and  vascular dementia who was transferred from OSH ED to [facility] for seizure-like activity post cardiac arrest. Per family at OSH, patient has been having syncopal episodes for the past 4 months which have become much more frequent in the past 1-2 months. Today patient had another syncopal episode and collapsed at home. CPR was initiated by family and received 6 rounds of epi while being transported via [facility]. Patient was intubated and ROSC achieved en route. CT head neg for acute intracranial processes. Echo showed EF 67%. EKG NSR. Lactic acid and trop elevated. UA positive for nitrites, leukocyte esterase, WBC, and bacteria. K 3.2. ABG with metabolic acidosis with partial respiratory compensation. While at OSH ED, patient had intermittent twitching of the head and eyes concerning for possible seizure like activity. He was kept on prop 30 for suppression of any possible seizure activity. He was transferred to [facility] for further workup and higher level of care.      Hospital Course by Event: 10/31/2024: Rewarming started at 4AM, achieved target temp at 7:58AM. EEG with diffusely suppressed background. MRI consistent with global anoxic injury and obstructive hydrocephalus. Made DNR after conversation with brother and sister at bedside (see ACP note.)  11/01/2024: Overnight patient required both cooling and Jessica hugger to maintain normothermia. Fent started due to tachypnea, tachycardia, vent dyssynchrony. Planning for family meeting Sunday.  11/02/2024: [facility]. [facility] ordered for overnight followed by family meeting tomorrow.   11/3: family meeting today for GOC and prognostication  11/4/2024 continue comfort care, d/c to inpatient hospice     Hospital Course:  No notes on file    Interval History:  unresponsive; remains on morphine infusion at 2 mg/hr    20 second periods of apnea    No family at the bedside    Review of Systems   Unable to perform ROS: Mental status change     Objective:     Vitals:  Temp: 96 °F (35.6  °C)  Pulse: (!) 113  BP: 135/74  MAP (mmHg): 96  Resp: (!) 8    Temp  Min: 96 °F (35.6 °C)  Max: 98 °F (36.7 °C)  Pulse  Min: 113  Max: 121  BP  Min: 135/74  Max: 169/86  MAP (mmHg)  Min: 96  Max: 117  Resp  Min: 8  Max: 14  SpO2  Min: 87 %  Max: 92 %    11/04 0701 - 11/05 0700  In: -   Out: 410 [Urine:410]   Unmeasured Output  Urine Occurrence: 3  Stool Occurrence: 0        Physical Exam  Vitals and nursing note reviewed.   Constitutional:       Appearance: He is ill-appearing.   HENT:      Head: Normocephalic and atraumatic.   Cardiovascular:      Rate and Rhythm: Normal rate and regular rhythm.   Pulmonary:      Effort: No respiratory distress.      Comments: Shallow resp  Apneic periods  Musculoskeletal:      Right lower leg: No edema.      Left lower leg: No edema.   Skin:     General: Skin is warm and dry.      Capillary Refill: Capillary refill takes less than 2 seconds.   Neurological:      Mental Status: He is unresponsive.      GCS: GCS eye subscore is 1. GCS verbal subscore is 1. GCS motor subscore is 2.      Comments: obtunded        Medications:  Continuousmorphine, Last Rate: 2 mg/hr (11/05/24 1305)    Scheduledscopolamine, 1 patch, Q3 Days    PRNacetaminophen, 650 mg, Q6H PRN  bisacodyL, 10 mg, Daily PRN  haloperidol lactate, 2 mg, Q4H PRN  lorazepam, 1 mg, Q4H PRN  morphine, 2 mg, Q30 Min PRN  sodium chloride 0.9%, 10 mL, PRN      Today I personally reviewed pertinent medications, lines/drains/airways, imaging, cardiology results, laboratory results, microbiology results,     Diet  No diet orders on file  Palliative Exam  Advance Care Planning    Nilo Aburto MD  Palliative Medicine  [facility] - Hosp[facility] and Palliative Medicine

## 2024-11-06 NOTE — NURSING
11:15 Dr Aburto notified nurse that pt mouth foaming observed, pt staring at ceiling with eyes shifting left to right, remains unresponsive.  Nurse assess same, change from earlier assess.  Oral care and suction performed with scant amount white secretions suctioned.  Lip moisturizer applied, pt jl well.  Ativan 1mg IVP admin. See MAR for med admin. Morphine gtt infusing at 2mg/hr.  Will cont. To monitor.    11:30 pt drowsy, closing eyes.  Remains unresponsive. No distress noted at this time. Will cont. To monitor.   18:00 oral care.

## 2024-11-06 NOTE — SUBJECTIVE & OBJECTIVE
Interval History:  unresponsive; remains on morphine infusion at 2 mg/hr    Some rapid eye movements noted; no obv seizure activity    No family at the bedside    Review of Systems   Unable to perform ROS: Mental status change     Objective:     Vitals:  Temp: 98 °F (36.7 °C)  Pulse: 85  BP: (!) 164/78  MAP (mmHg): 111  Resp: 17  SpO2: 96 %    Temp  Min: 97.1 °F (36.2 °C)  Max: 98 °F (36.7 °C)  Pulse  Min: 85  Max: 87  BP  Min: 143/73  Max: 164/78  MAP (mmHg)  Min: 103  Max: 111  Resp  Min: 8  Max: 17  SpO2  Min: 91 %  Max: 96 %    11/05 0701 - 11/06 0700  In: -   Out: 250 [Urine:250]   Unmeasured Output  Urine Occurrence: 3  Stool Occurrence: 0        Physical Exam  Vitals and nursing note reviewed.   Constitutional:       Appearance: He is ill-appearing.   HENT:      Head: Normocephalic and atraumatic.   Cardiovascular:      Rate and Rhythm: Normal rate and regular rhythm.   Pulmonary:      Effort: No respiratory distress.      Comments: Shallow resp  Apneic periods  Musculoskeletal:      Right lower leg: No edema.      Left lower leg: No edema.   Skin:     General: Skin is warm and dry.      Capillary Refill: Capillary refill takes less than 2 seconds.   Neurological:      Mental Status: He is unresponsive.      GCS: GCS eye subscore is 1. GCS verbal subscore is 1. GCS motor subscore is 2.      Comments: Obtunded  Non purposeful Rapid eye movement         Medications:  Continuousmorphine, Last Rate: 2 mg/hr (11/05/24 1305)    Scheduledartificial tears, 1 drop, Q4H  scopolamine, 1 patch, Q3 Days    PRNacetaminophen, 650 mg, Q6H PRN  artificial tears, 1 drop, PRN  bisacodyL, 10 mg, Daily PRN  haloperidol lactate, 2 mg, Q4H PRN  lorazepam, 1 mg, Q4H PRN  morphine, 2 mg, Q30 Min PRN  sodium chloride 0.9%, 10 mL, PRN      Today I personally reviewed pertinent medications, lines/drains/airways, imaging, cardiology results, laboratory results, microbiology results,     Diet  No diet orders on file  Palliative  Exam  Advance Care Planning

## 2024-11-06 NOTE — ASSESSMENT & PLAN NOTE
Hospice Qualifying Diagnosis: Cardiac arrest and anoxic brain injury      Symptom Assessment:  Overall, patient continues to have ongoing clinical deterioration related to the terminal diagnosis      - Pain: controlled  - Dyspnea: controlled  - Agitation: controlled  - Mentation: unresponsive    Current Medications:      Current Facility-Administered Medications:     acetaminophen suppository 650 mg, 650 mg, Rectal, Q6H PRN, Nilo Aburto MD    artificial tears 0.5 % ophthalmic solution 1 drop, 1 drop, Both Eyes, Q4H, Nilo Aburto MD    artificial tears 0.5 % ophthalmic solution 1 drop, 1 drop, Both Eyes, PRN, Nilo Aburto MD    bisacodyL suppository 10 mg, 10 mg, Rectal, Daily PRN, Nilo Aburto MD    haloperidol lactate injection 2 mg, 2 mg, Intravenous, Q4H PRN, Nilo Aburto MD    LORazepam injection 1 mg, 1 mg, Intravenous, Q4H PRN, Nilo Aburto MD    morphine 100 mg (1 mg/mL) in NACL 100 mL infusion (TITRATING), 0-10 mg/hr, Intravenous, Continuous, Izabel Jones PA-C, Last Rate: 2 mL/hr at 11/05/24 1305, 2 mg/hr at 11/05/24 1305    morphine IV bolus from bag/infusion 2 mg, 2 mg, Intravenous, Q30 Min PRN, Nilo Aburto MD    scopolamine 1.3-1.5 mg (1 mg over 3 days) 1 patch, 1 patch, Transdermal, Q3 Days, Soraida Dumont PA-C, 1 patch at 11/03/24 1358    sodium chloride 0.9% flush 10 mL, 10 mL, Intravenous, PRN, Maya Cruz PA-C      Family discussion updates:  Comfort focused care plan    Plan of care:  Transfer to the [facility]; comfort focused care plan    Follow up plans:   - Symptomatic condition is not stabilized.   - Death is imminent within a short period of time as evidenced by clinical deterioration such as mottling of skin, respiratory status change, and level of consciousness which are not conducive to medical transport.  - Hospice is working diligently to develop and provide a plan for safe discharge to a lower level of care.       Advanced  Directives::  Living Will: No  LaPOST: No  Do Not Resuscitate Status: Yes  Surrogate Decision maker / Medical Power of :   Maryse Campos  Sister  Emergency Contact  [phone]      Comfort care plan:  Dyspnea / Secretions  - O2 per nasal cannula as needed for comfort  - Morphine 2mg/hr infusion and 2 mg IV q1h PRN dyspnea or breakthrough pain  - Lorazepam 1 mg IV q1h PRN dyspnea refractory to opioids, anxiety  - Glycopyrrolate 0.2 mg IV q4h PRN secretions  - Frequent oral care  - Gentle repositioning of head to shift oropharyngeal secretions out of airway  - Yankauer suction at bedside  - Fan at bedside  - Avoid artificial hydration    Pain  - Opioids as above    Seizures  - ativan 1 mg q 1 hour prn; trial dose now for possible underlying NCSE    Agitation / Delirium / Anxiety  - Gentle reorientation techniques, minimize environmental stimuli  - Discontinue laboratory draws and remove non-essential lines  - Treat for pain/dyspnea as above  - Lorazepam 1 mg IV q1h PRN anxiety, dyspnea refractory to opioids  - Haldol 2 mg IV q4h PRN agitation    Nutrition / Hydration  - Discontinue IV fluids and artificial nutrition  - Comfort feedings as desired and tolerated  - Simplify medication regimen by eliminating those medications that provide little to no benefit at end-of-life    Nausea  - Ondansetron 4 mg IV q6h PRN nausea/vomiting    Bowel Regimen  - Bisacodyl 10 mg suppository daily PRN constipation  - Will not prioritize at end-of-life    Fever  - Apply cold compresses  - Acetaminophen 650 mg suppository q4h PRN fever  - Fan at bedside

## 2024-11-06 NOTE — PROGRESS NOTES
[facility]  Palliative Medicine  Progress Note    Patient Name: Irvin Nuñez Jr.  MRN: [id number]  Admission Date: 10/30/2024  Hospital Length of Stay: 7 days  Code Status: DNR   Attending Provider: Nilo Aburto MD  Consulting Provider: Nilo Aburto MD  Primary Care Physician: Alberto Lundberg Jr., MD  Principal Problem:Cardiac arrest    Patient information was obtained from  nursing .      Assessment/Plan:     Palliative Care  Comfort measures only status  Hospice Qualifying Diagnosis: Cardiac arrest and anoxic brain injury      Symptom Assessment:  Overall, patient continues to have ongoing clinical deterioration related to the terminal diagnosis      - Pain: controlled  - Dyspnea: controlled  - Agitation: controlled  - Mentation: unresponsive    Current Medications:      Current Facility-Administered Medications:     acetaminophen suppository 650 mg, 650 mg, Rectal, Q6H PRN, Nilo Aburto MD    artificial tears 0.5 % ophthalmic solution 1 drop, 1 drop, Both Eyes, Q4H, Nilo Aburto MD    artificial tears 0.5 % ophthalmic solution 1 drop, 1 drop, Both Eyes, PRN, Nilo Aburto MD    bisacodyL suppository 10 mg, 10 mg, Rectal, Daily PRN, Nilo Aburto MD    haloperidol lactate injection 2 mg, 2 mg, Intravenous, Q4H PRN, Nilo Aburto MD    LORazepam injection 1 mg, 1 mg, Intravenous, Q4H PRN, Nilo Aburto MD    morphine 100 mg (1 mg/mL) in NACL 100 mL infusion (TITRATING), 0-10 mg/hr, Intravenous, Continuous, Izabel Jones PA-C, Last Rate: 2 mL/hr at 11/05/24 1305, 2 mg/hr at 11/05/24 1305    morphine IV bolus from bag/infusion 2 mg, 2 mg, Intravenous, Q30 Min PRN, Nilo Aburto MD    scopolamine 1.3-1.5 mg (1 mg over 3 days) 1 patch, 1 patch, Transdermal, Q3 Days, Soraida Dumont PA-C, 1 patch at 11/03/24 1358    sodium chloride 0.9% flush 10 mL, 10 mL, Intravenous, PRN, Maya Cruz PA-C      Family discussion updates:  Comfort focused  care plan    Plan of care:  Transfer to the [facility]; comfort focused care plan    Follow up plans:   - Symptomatic condition is not stabilized.   - Death is imminent within a short period of time as evidenced by clinical deterioration such as mottling of skin, respiratory status change, and level of consciousness which are not conducive to medical transport.  - Hospice is working diligently to develop and provide a plan for safe discharge to a lower level of care.       Advanced Directives::  Living Will: No  LaPOST: No  Do Not Resuscitate Status: Yes  Surrogate Decision maker / Medical Power of :   Maryse Campos  Sister  Emergency Contact  [phone]      Comfort care plan:  Dyspnea / Secretions  - O2 per nasal cannula as needed for comfort  - Morphine 2mg/hr infusion and 2 mg IV q1h PRN dyspnea or breakthrough pain  - Lorazepam 1 mg IV q1h PRN dyspnea refractory to opioids, anxiety  - Glycopyrrolate 0.2 mg IV q4h PRN secretions  - Frequent oral care  - Gentle repositioning of head to shift oropharyngeal secretions out of airway  - Yankauer suction at bedside  - Fan at bedside  - Avoid artificial hydration    Pain  - Opioids as above    Seizures  - ativan 1 mg q 1 hour prn; trial dose now for possible underlying NCSE    Agitation / Delirium / Anxiety  - Gentle reorientation techniques, minimize environmental stimuli  - Discontinue laboratory draws and remove non-essential lines  - Treat for pain/dyspnea as above  - Lorazepam 1 mg IV q1h PRN anxiety, dyspnea refractory to opioids  - Haldol 2 mg IV q4h PRN agitation    Nutrition / Hydration  - Discontinue IV fluids and artificial nutrition  - Comfort feedings as desired and tolerated  - Simplify medication regimen by eliminating those medications that provide little to no benefit at end-of-life    Nausea  - Ondansetron 4 mg IV q6h PRN nausea/vomiting    Bowel Regimen  - Bisacodyl 10 mg suppository daily PRN constipation  - Will not prioritize at  end-of-life    Fever  - Apply cold compresses  - Acetaminophen 650 mg suppository q4h PRN fever  - Fan at bedside          Subjective:     Chief Complaint: No chief complaint on file.      HPI:   Irvin Nuñez is a 75 yo male with PMHx HTN, HLD, schizophrenia, and vascular dementia who was transferred from OSH ED to [facility] for seizure-like activity post cardiac arrest. Per family at OSH, patient has been having syncopal episodes for the past 4 months which have become much more frequent in the past 1-2 months. Today patient had another syncopal episode and collapsed at home. CPR was initiated by family and received 6 rounds of epi while being transported via [facility]. Patient was intubated and ROSC achieved en route. CT head neg for acute intracranial processes. Echo showed EF 67%. EKG NSR. Lactic acid and trop elevated. UA positive for nitrites, leukocyte esterase, WBC, and bacteria. K 3.2. ABG with metabolic acidosis with partial respiratory compensation. While at OSH ED, patient had intermittent twitching of the head and eyes concerning for possible seizure like activity. He was kept on prop 30 for suppression of any possible seizure activity. He was transferred to [facility] for further workup and higher level of care.      Hospital Course by Event: 10/31/2024: Rewarming started at 4AM, achieved target temp at 7:58AM. EEG with diffusely suppressed background. MRI consistent with global anoxic injury and obstructive hydrocephalus. Made DNR after conversation with brother and sister at bedside (see ACP note.)  11/01/2024: Overnight patient required both cooling and Jessica hugger to maintain normothermia. Fent started due to tachypnea, tachycardia, vent dyssynchrony. Planning for family meeting Sunday.  11/02/2024: [facility]. [facility] ordered for overnight followed by family meeting tomorrow.   11/3: family meeting today for GOC and prognostication  11/4/2024 continue comfort care, d/c to inpatient hospice     Hospital Course:  No notes  on file    Interval History:  unresponsive; remains on morphine infusion at 2 mg/hr    Some rapid eye movements noted; no obv seizure activity    No family at the bedside    Review of Systems   Unable to perform ROS: Mental status change     Objective:     Vitals:  Temp: 98 °F (36.7 °C)  Pulse: 85  BP: (!) 164/78  MAP (mmHg): 111  Resp: 17  SpO2: 96 %    Temp  Min: 97.1 °F (36.2 °C)  Max: 98 °F (36.7 °C)  Pulse  Min: 85  Max: 87  BP  Min: 143/73  Max: 164/78  MAP (mmHg)  Min: 103  Max: 111  Resp  Min: 8  Max: 17  SpO2  Min: 91 %  Max: 96 %    11/05 0701 - 11/06 0700  In: -   Out: 250 [Urine:250]   Unmeasured Output  Urine Occurrence: 3  Stool Occurrence: 0        Physical Exam  Vitals and nursing note reviewed.   Constitutional:       Appearance: He is ill-appearing.   HENT:      Head: Normocephalic and atraumatic.   Cardiovascular:      Rate and Rhythm: Normal rate and regular rhythm.   Pulmonary:      Effort: No respiratory distress.      Comments: Shallow resp  Apneic periods  Musculoskeletal:      Right lower leg: No edema.      Left lower leg: No edema.   Skin:     General: Skin is warm and dry.      Capillary Refill: Capillary refill takes less than 2 seconds.   Neurological:      Mental Status: He is unresponsive.      GCS: GCS eye subscore is 1. GCS verbal subscore is 1. GCS motor subscore is 2.      Comments: Obtunded  Non purposeful Rapid eye movement         Medications:  Continuousmorphine, Last Rate: 2 mg/hr (11/05/24 1305)    Scheduledartificial tears, 1 drop, Q4H  scopolamine, 1 patch, Q3 Days    PRNacetaminophen, 650 mg, Q6H PRN  artificial tears, 1 drop, PRN  bisacodyL, 10 mg, Daily PRN  haloperidol lactate, 2 mg, Q4H PRN  lorazepam, 1 mg, Q4H PRN  morphine, 2 mg, Q30 Min PRN  sodium chloride 0.9%, 10 mL, PRN      Today I personally reviewed pertinent medications, lines/drains/airways, imaging, cardiology results, laboratory results, microbiology results,     Diet  No diet orders on file  Palliative  Exam  Advance Care Planning    Nilo Aburto MD  Palliative Medicine  [facility]

## 2024-11-06 NOTE — PLAN OF CARE
20:10 Bedside Handoff report received. Patient is resting, eyes are slightly open. Eyes are non reactive to light and does not follow. Patient respiration deep and shallow at the rate of 11 bpm currently. No sign of distress/discomfort at the moment. Patient is on Cont Morphine drip infusing at 2mg/hr, titratable. Patient is on suprapubic catheter, intact and cleaned. Light adjusted for comfort. Ongoing plan of care and monitoring for any changes.    12:10 Reassessed patient condition. Patient is resting both eyes are slightly open. No sign of distress or discomfort noted. Still on Cont Morphine drip infusing 2mg/hr. Ongoing plan of care and monitoring.    4:34 Patient is resting, both eyes are slightly open. No distress or discomfort noted. Ongoing plan of care and monitoring for any changes.    Problem: Adult Inpatient Plan of Care  Goal: Plan of Care Review  Outcome: Progressing  Goal: Patient-Specific Goal (Individualized)  Outcome: Progressing  Goal: Absence of Hospital-Acquired Illness or Injury  Outcome: Progressing  Goal: Optimal Comfort and Wellbeing  Outcome: Progressing  Goal: Readiness for Transition of Care  Outcome: Progressing     Problem: Fall Injury Risk  Goal: Absence of Fall and Fall-Related Injury  Outcome: Progressing     Problem: Palliative Care  Goal: Enhanced Quality of Life  Outcome: Progressing     Problem: End-of-Life Care  Goal: Comfort, Peace and Preserved Dignity  Outcome: Progressing

## 2024-11-07 NOTE — DISCHARGE SUMMARY
[facility]  Palliative Medicine  Discharge Summary      Patient Name: Irvin Nuñez Jr.  MRN: [id number]  Admission Date: 10/30/2024  Hospital Length of Stay: 8 days  Discharge Date and Time: 11/6/2024   Attending Physician: Nilo Aburto MD   Discharging Provider: Nilo Aburto MD  Primary Care Provider: Alberto Lundberg Jr., MD    HPI:   Irvin Nuñez is a 75 yo male with PMHx HTN, HLD, schizophrenia, and vascular dementia who was transferred from OSH ED to [facility] for seizure-like activity post cardiac arrest. Per family at OSH, patient has been having syncopal episodes for the past 4 months which have become much more frequent in the past 1-2 months. Today patient had another syncopal episode and collapsed at home. CPR was initiated by family and received 6 rounds of epi while being transported via [facility]. Patient was intubated and ROSC achieved en route. CT head neg for acute intracranial processes. Echo showed EF 67%. EKG NSR. Lactic acid and trop elevated. UA positive for nitrites, leukocyte esterase, WBC, and bacteria. K 3.2. ABG with metabolic acidosis with partial respiratory compensation. While at OSH ED, patient had intermittent twitching of the head and eyes concerning for possible seizure like activity. He was kept on prop 30 for suppression of any possible seizure activity. He was transferred to [facility] for further workup and higher level of care.      Hospital Course by Event: 10/31/2024: Rewarming started at 4AM, achieved target temp at 7:58AM. EEG with diffusely suppressed background. MRI consistent with global anoxic injury and obstructive hydrocephalus. Made DNR after conversation with brother and sister at bedside (see ACP note.)  11/01/2024: Overnight patient required both cooling and Jessica hugger to maintain normothermia. Fent started due to tachypnea, tachycardia, vent dyssynchrony. Planning for family meeting Sunday.  11/02/2024: [facility] for overnight  followed by family meeting tomorrow.   11/3: family meeting today for GOC and prognostication  11/4/2024 continue comfort care, d/c to inpatient hospice     * No surgery found *      Hospital Course:   No notes on file     Goals of Care Treatment Preferences:  Code Status: DNR      Consults:   Consults (From admission, onward)          Status Ordering Provider     Inpatient consult to Palliative Care  Once        Provider:  (Not yet assigned)    Completed ABEL SHETH     Inpatient consult to Neurosurgery  Once        Provider:  (Not yet assigned)    Completed VALENTINA TORRES     Inpatient consult to Infectious Diseases  Once        Provider:  (Not yet assigned)    Completed TORRES VALENTINA     Inpatient consult to Ophthalmology  Once        Provider:  (Not yet assigned)    Completed TORRES, VALENTINA     Inpatient consult to Registered Dietitian/Nutritionist  Once        Provider:  (Not yet assigned)    Completed BRIAN VALENTINA     Inpatient consult to Cardiology  Once        Provider:  (Not yet assigned)    Completed MIGUEL GUILLERMO            No new Assessment & Plan notes have been filed under this hospital service since the last note was generated.  Service: Palliative Medicine    Final Active Diagnoses:    Diagnosis Date Noted POA    PRINCIPAL PROBLEM:  Cardiac arrest [I46.9] 10/29/2024 Yes    Comfort measures only status [Z51.5] 11/03/2024 Not Applicable    Transaminitis [R74.01] 10/31/2024 Yes    Obstructive hydrocephalus [G91.1] 10/31/2024 No    Brain hypoxic injury [G93.1] 10/31/2024 Yes    Glaucoma [H40.9] 10/31/2024 No    ACP (advance care planning) [Z71.89] 10/31/2024 Not Applicable    Seizure [R56.9] 10/30/2024 Yes    Penile lesion [N48.9] 10/30/2024 Yes    Acute respiratory failure [J96.00] 10/30/2024 Yes    Bifascicular block [I45.2] 10/30/2024 Yes    Urinary tract infection associated with catheterization of urinary tract [T83.511A, N39.0] 09/15/2024 Yes    Chronic suprapubic catheter [Z93.59]  09/15/2024 Not Applicable    Microcytic anemia [D50.9] 01/20/2024 Yes    Vascular dementia [F01.50] 09/22/2023 Yes    Essential hypertension [I10] 09/29/2015 Yes    Hyperlipidemia [E78.5] 09/29/2015 Yes      Problems Resolved During this Admission:    Diagnosis Date Noted Date Resolved POA    Metabolic acidosis [E87.20] 10/30/2024 11/01/2024 Yes    Elevated troponin [R79.89] 10/30/2024 11/01/2024 Yes    Syncope [R55] 09/22/2023 10/30/2024 Yes       Discharged Condition: critical    Disposition: inpatient hospice    Follow Up:    Patient Instructions:   No discharge procedures on file.    Significant Diagnostic Studies: N/A  MRI Brain 11/3/2024  Impression:     1. Similar appearance of diffusion restriction within the bilateral cerebral hemispheres and bilateral cerebellar hemispheres including involvement of the bilateral basal ganglia, as detailed above.  Findings consistent with evolving acute/subacute infarct with possible superimposed global anoxic injury.  2. Persistent marked effacement of the 4th ventricle with slight upward cerebellar herniation in crowding of the cerebellar tonsils at the foramen magnum with stable upstream hydrocephalus.  3. Stable focal susceptibility artifact within the right parasagittal and left medial occipital lobes, possibly representing sequelae of laminar necrosis or petechial hemorrhage.  4. Stable heterogeneous appearance of the left globe, suggestive of vitreous hemorrhage.  This report was flagged in Epic as abnormal.    Pending Diagnostic Studies:       None           Medications:  Transfer Medications (for Discharge Readmit only):   Current Facility-Administered Medications   Medication Dose Route Frequency Provider Last Rate Last Admin    acetaminophen suppository 650 mg  650 mg Rectal Q6H PRN Nilo Aburto MD        artificial tears 0.5 % ophthalmic solution 1 drop  1 drop Both Eyes Q4H Nilo Aburto MD   1 drop at 11/07/24 1008    artificial tears 0.5 % ophthalmic  solution 1 drop  1 drop Both Eyes PRN Nilo Aburto MD        bisacodyL suppository 10 mg  10 mg Rectal Daily PRN Nilo Aburto MD        diazePAM injection 5 mg  5 mg Intravenous Q8H Nilo Aburto MD   5 mg at 11/07/24 1130    haloperidol lactate injection 2 mg  2 mg Intravenous Q4H PRN Nilo Aburto MD        LORazepam injection 1 mg  1 mg Intravenous Q4H PRN Nilo Aburto MD   1 mg at 11/06/24 1113    morphine 100 mg (1 mg/mL) in NACL 100 mL infusion (TITRATING)  0-10 mg/hr Intravenous Continuous Izabel Jones PA-C 2 mL/hr at 11/05/24 1305 2 mg/hr at 11/05/24 1305    morphine IV bolus from bag/infusion 2 mg  2 mg Intravenous Q30 Min PRN Nilo Aburto MD        scopolamine 1.3-1.5 mg (1 mg over 3 days) 1 patch  1 patch Transdermal Q3 Days Soraida Dumont PA-C   1 patch at 11/06/24 1500    sodium chloride 0.9% flush 10 mL  10 mL Intravenous PRN Maya Cruz PA-C           Indwelling Lines/Drains at time of discharge:   Lines/Drains/Airways       Drain  Duration                  Suprapubic Catheter 10/30/24 1255 7 days                  Nilo Aburto MD  Department of Palliative Medicine  [facility] - Hospice and Palliative Medicine

## 2024-11-07 NOTE — PROGRESS NOTES
[facility]  Palliative Medicine  Progress Note    Patient Name: Irvin Nuñez Jr.  MRN: [id number]  Admission Date: 10/30/2024  Hospital Length of Stay: 8 days  Code Status: DNR   Attending Provider: Nilo Aburto MD  Consulting Provider: Nilo Aburto MD  Primary Care Physician: Alberto Lundberg Jr., MD  Principal Problem:Cardiac arrest    Patient information was obtained from  nursing .      Assessment/Plan:     Palliative Care  Comfort measures only status  Hospice Qualifying Diagnosis: Cardiac arrest and anoxic brain injury      Symptom Assessment:  Overall, patient continues to have ongoing clinical deterioration related to the terminal diagnosis      - Pain: controlled  - Dyspnea: controlled  - Agitation: controlled  - Mentation: unresponsive    Current Medications:      Current Facility-Administered Medications:     acetaminophen suppository 650 mg, 650 mg, Rectal, Q6H PRN, Nilo Aburto MD    artificial tears 0.5 % ophthalmic solution 1 drop, 1 drop, Both Eyes, Q4H, Nilo Aburto MD, 1 drop at 11/07/24 0600    artificial tears 0.5 % ophthalmic solution 1 drop, 1 drop, Both Eyes, PRN, Nilo Aburto MD    bisacodyL suppository 10 mg, 10 mg, Rectal, Daily PRN, Nilo Aburto MD    diazePAM injection 5 mg, 5 mg, Intravenous, Q8H, Nilo Aburto MD    haloperidol lactate injection 2 mg, 2 mg, Intravenous, Q4H PRN, Nilo Aburto MD    LORazepam injection 1 mg, 1 mg, Intravenous, Q4H PRN, Nilo Aburto MD, 1 mg at 11/06/24 1113    morphine 100 mg (1 mg/mL) in NACL 100 mL infusion (TITRATING), 0-10 mg/hr, Intravenous, Continuous, Izabel Jones PA-C, Last Rate: 2 mL/hr at 11/05/24 1305, 2 mg/hr at 11/05/24 1305    morphine IV bolus from bag/infusion 2 mg, 2 mg, Intravenous, Q30 Min PRN, Nilo Aburto MD    scopolamine 1.3-1.5 mg (1 mg over 3 days) 1 patch, 1 patch, Transdermal, Q3 Days, Soraida Dumont PA-C, 1 patch at 11/06/24 1500    sodium  chloride 0.9% flush 10 mL, 10 mL, Intravenous, PRN, Maya Cruz PA-C      Family discussion updates:  Comfort focused care plan    Plan of care:  Transfer to the [facility]; comfort focused care plan    Follow up plans:   - Symptomatic condition is not stabilized.   - planning to admit to GIP level of hospice due to ongoing required interventions      Advanced Directives::  Living Will: No  LaPOST: No  Do Not Resuscitate Status: Yes  Surrogate Decision maker / Medical Power of :   Maryse Campos  Sister  Emergency Contact  [phone]      Comfort care plan:  Dyspnea / Secretions  - O2 per nasal cannula as needed for comfort  - Morphine 2mg/hr infusion and 2 mg IV q1h PRN dyspnea or breakthrough pain  - Lorazepam 1 mg IV q1h PRN dyspnea refractory to opioids, anxiety  - Glycopyrrolate 0.2 mg IV q4h PRN secretions  - Frequent oral care  - Gentle repositioning of head to shift oropharyngeal secretions out of airway  - Yankauer suction at bedside  - Fan at bedside  - Avoid artificial hydration    Pain  - Opioids as above    Seizures  - ativan 1 mg q 1 hour prn; trial dose now for possible underlying NCSE  - schedule valium 5 mg tid    Agitation / Delirium / Anxiety  - Gentle reorientation techniques, minimize environmental stimuli  - Discontinue laboratory draws and remove non-essential lines  - Treat for pain/dyspnea as above  - Lorazepam 1 mg IV q1h PRN anxiety, dyspnea refractory to opioids  - Haldol 2 mg IV q4h PRN agitation    Nutrition / Hydration  - Discontinue IV fluids and artificial nutrition  - Comfort feedings as desired and tolerated  - Simplify medication regimen by eliminating those medications that provide little to no benefit at end-of-life    Nausea  - Ondansetron 4 mg IV q6h PRN nausea/vomiting    Bowel Regimen  - Bisacodyl 10 mg suppository daily PRN constipation  - Will not prioritize at end-of-life    Fever  - Apply cold compresses  - Acetaminophen 650 mg suppository q4h PRN fever  - Fan at  bedside        Subjective:     Chief Complaint: No chief complaint on file.      HPI:   Irvin Nuñez is a 73 yo male with PMHx HTN, HLD, schizophrenia, and vascular dementia who was transferred from OSH ED to [facility] for seizure-like activity post cardiac arrest. Per family at OSH, patient has been having syncopal episodes for the past 4 months which have become much more frequent in the past 1-2 months. Today patient had another syncopal episode and collapsed at home. CPR was initiated by family and received 6 rounds of epi while being transported via [facility]. Patient was intubated and ROSC achieved en route. CT head neg for acute intracranial processes. Echo showed EF 67%. EKG NSR. Lactic acid and trop elevated. UA positive for nitrites, leukocyte esterase, WBC, and bacteria. K 3.2. ABG with metabolic acidosis with partial respiratory compensation. While at OSH ED, patient had intermittent twitching of the head and eyes concerning for possible seizure like activity. He was kept on prop 30 for suppression of any possible seizure activity. He was transferred to [facility] for further workup and higher level of care.      Hospital Course by Event: 10/31/2024: Rewarming started at 4AM, achieved target temp at 7:58AM. EEG with diffusely suppressed background. MRI consistent with global anoxic injury and obstructive hydrocephalus. Made DNR after conversation with brother and sister at bedside (see ACP note.)  11/01/2024: Overnight patient required both cooling and Jessica hugger to maintain normothermia. Fent started due to tachypnea, tachycardia, vent dyssynchrony. Planning for family meeting Sunday.  11/02/2024: [facility]. [facility] ordered for overnight followed by family meeting tomorrow.   11/3: family meeting today for GOC and prognostication  11/4/2024 continue comfort care, d/c to inpatient hospice     Hospital Course:  No notes on file    Interval History:  unresponsive; remains on morphine infusion at 2 mg/hr    Ativan given  yesterday for concern of underlying NCSE; improvement in stiffness and eye movement    No family at the bedside    Review of Systems   Unable to perform ROS: Mental status change     Objective:     Vitals:  Temp: 98.5 °F (36.9 °C)  Pulse: (!) 122  BP: (!) 136/96  MAP (mmHg): 113  Resp: 16  SpO2: 99 %    Temp  Min: 97.9 °F (36.6 °C)  Max: 98.5 °F (36.9 °C)  Pulse  Min: 122  Max: 126  BP  Min: 136/96  Max: 150/72  MAP (mmHg)  Min: 97  Max: 113  Resp  Min: 9  Max: 16  SpO2  Min: 97 %  Max: 99 %    11/06 0701 - 11/07 0700  In: -   Out: 425 [Urine:425]   Unmeasured Output  Urine Occurrence: 3  Stool Occurrence: 0        Physical Exam  Vitals and nursing note reviewed.   Constitutional:       Appearance: He is ill-appearing.   HENT:      Head: Normocephalic and atraumatic.   Cardiovascular:      Rate and Rhythm: Normal rate and regular rhythm.   Pulmonary:      Effort: No respiratory distress.      Comments: Shallow resp  Apneic periods  Musculoskeletal:      Right lower leg: No edema.      Left lower leg: No edema.   Skin:     General: Skin is warm and dry.      Capillary Refill: Capillary refill takes less than 2 seconds.   Neurological:      Mental Status: He is unresponsive.      GCS: GCS eye subscore is 1. GCS verbal subscore is 1. GCS motor subscore is 2.      Comments: Obtunded  Non purposeful Rapid eye movement         Medications:  Continuousmorphine, Last Rate: 2 mg/hr (11/05/24 1305)    Scheduledartificial tears, 1 drop, Q4H  diazePAM, 5 mg, Q8H  scopolamine, 1 patch, Q3 Days    PRNacetaminophen, 650 mg, Q6H PRN  artificial tears, 1 drop, PRN  bisacodyL, 10 mg, Daily PRN  haloperidol lactate, 2 mg, Q4H PRN  lorazepam, 1 mg, Q4H PRN  morphine, 2 mg, Q30 Min PRN  sodium chloride 0.9%, 10 mL, PRN      Today I personally reviewed pertinent medications, lines/drains/airways, imaging, cardiology results, laboratory results, microbiology results,     Diet  No diet orders on file  Palliative Exam  Advance Care  Planning    Nilo Aburto MD  Palliative Medicine  [facility]

## 2024-11-07 NOTE — NURSING
10:30 pt's sister, Maryse and cousin at bedside.  Pt supine in bed with eyes open, does not track, remains non verbal, and non responsive.  Snoring through lips with lips closed.   Resp even and unlabored on RA.  Lips dry and peeling with white dry thick sputum observed on lips. Oral care and suction performed; lip moisturizer applied. Small amount thin white sputum suctioned.  Body tense and stiff.  Awaiting scheduled Valium dose from pharmacy.  Urostomy tube remains intact with dressing CDI, no drainage noted. Peeling skin observed on penis shaft. Urine drainage noted from penis.  Chucks pad changed and triad cream applied. Pt jl well.  Morphine gtt continues infusing at 2mg/hr.

## 2024-11-07 NOTE — PLAN OF CARE
Problem: Palliative Care  Goal: Enhanced Quality of Life  Outcome: Progressing     Problem: End-of-Life Care  Goal: Comfort, Peace and Preserved Dignity  Outcome: Progressing     Problem: Pain Acute  Goal: Optimal Pain Control and Function  Outcome: Progressing     Morphine gtt infusing at 2mg/hr.

## 2024-11-07 NOTE — ASSESSMENT & PLAN NOTE
Hospice Qualifying Diagnosis: Cardiac arrest and anoxic brain injury      Symptom Assessment:  Overall, patient continues to have ongoing clinical deterioration related to the terminal diagnosis      - Pain: controlled  - Dyspnea: controlled  - Agitation: controlled  - Mentation: unresponsive    Current Medications:      Current Facility-Administered Medications:     acetaminophen suppository 650 mg, 650 mg, Rectal, Q6H PRN, Nilo Aburto MD    artificial tears 0.5 % ophthalmic solution 1 drop, 1 drop, Both Eyes, Q4H, Nilo Aburto MD, 1 drop at 11/07/24 0600    artificial tears 0.5 % ophthalmic solution 1 drop, 1 drop, Both Eyes, PRN, Nilo Aburto MD    bisacodyL suppository 10 mg, 10 mg, Rectal, Daily PRN, Nilo Aburto MD    diazePAM injection 5 mg, 5 mg, Intravenous, Q8H, Nilo Aburto MD    haloperidol lactate injection 2 mg, 2 mg, Intravenous, Q4H PRN, Nilo Aburto MD    LORazepam injection 1 mg, 1 mg, Intravenous, Q4H PRN, Nilo Aburto MD, 1 mg at 11/06/24 1113    morphine 100 mg (1 mg/mL) in NACL 100 mL infusion (TITRATING), 0-10 mg/hr, Intravenous, Continuous, Izabel Jones PA-C, Last Rate: 2 mL/hr at 11/05/24 1305, 2 mg/hr at 11/05/24 1305    morphine IV bolus from bag/infusion 2 mg, 2 mg, Intravenous, Q30 Min PRN, Nilo Aburto MD    scopolamine 1.3-1.5 mg (1 mg over 3 days) 1 patch, 1 patch, Transdermal, Q3 Days, Soraida Dumont PA-C, 1 patch at 11/06/24 1500    sodium chloride 0.9% flush 10 mL, 10 mL, Intravenous, PRN, Maya Cruz PA-C      Family discussion updates:  Comfort focused care plan    Plan of care:  Transfer to the [facility]; comfort focused care plan    Follow up plans:   - Symptomatic condition is not stabilized.   - planning to admit to GIP level of hospice due to ongoing required interventions      Advanced Directives::  Living Will: No  LaPOST: No  Do Not Resuscitate Status: Yes  Surrogate Decision maker / Medical Power of :   Maryse  Beth Marcial  Emergency Contact  [phone]      Comfort care plan:  Dyspnea / Secretions  - O2 per nasal cannula as needed for comfort  - Morphine 2mg/hr infusion and 2 mg IV q1h PRN dyspnea or breakthrough pain  - Lorazepam 1 mg IV q1h PRN dyspnea refractory to opioids, anxiety  - Glycopyrrolate 0.2 mg IV q4h PRN secretions  - Frequent oral care  - Gentle repositioning of head to shift oropharyngeal secretions out of airway  - Yankauer suction at bedside  - Fan at bedside  - Avoid artificial hydration    Pain  - Opioids as above    Seizures  - ativan 1 mg q 1 hour prn; trial dose now for possible underlying NCSE  - schedule valium 5 mg tid    Agitation / Delirium / Anxiety  - Gentle reorientation techniques, minimize environmental stimuli  - Discontinue laboratory draws and remove non-essential lines  - Treat for pain/dyspnea as above  - Lorazepam 1 mg IV q1h PRN anxiety, dyspnea refractory to opioids  - Haldol 2 mg IV q4h PRN agitation    Nutrition / Hydration  - Discontinue IV fluids and artificial nutrition  - Comfort feedings as desired and tolerated  - Simplify medication regimen by eliminating those medications that provide little to no benefit at end-of-life    Nausea  - Ondansetron 4 mg IV q6h PRN nausea/vomiting    Bowel Regimen  - Bisacodyl 10 mg suppository daily PRN constipation  - Will not prioritize at end-of-life    Fever  - Apply cold compresses  - Acetaminophen 650 mg suppository q4h PRN fever  - Fan at bedside

## 2024-11-07 NOTE — SUBJECTIVE & OBJECTIVE
Interval History:  unresponsive; remains on morphine infusion at 2 mg/hr    Ativan given yesterday for concern of underlying NCSE; improvement in stiffness and eye movement    No family at the bedside    Review of Systems   Unable to perform ROS: Mental status change     Objective:     Vitals:  Temp: 98.5 °F (36.9 °C)  Pulse: (!) 122  BP: (!) 136/96  MAP (mmHg): 113  Resp: 16  SpO2: 99 %    Temp  Min: 97.9 °F (36.6 °C)  Max: 98.5 °F (36.9 °C)  Pulse  Min: 122  Max: 126  BP  Min: 136/96  Max: 150/72  MAP (mmHg)  Min: 97  Max: 113  Resp  Min: 9  Max: 16  SpO2  Min: 97 %  Max: 99 %    11/06 0701 - 11/07 0700  In: -   Out: 425 [Urine:425]   Unmeasured Output  Urine Occurrence: 3  Stool Occurrence: 0        Physical Exam  Vitals and nursing note reviewed.   Constitutional:       Appearance: He is ill-appearing.   HENT:      Head: Normocephalic and atraumatic.   Cardiovascular:      Rate and Rhythm: Normal rate and regular rhythm.   Pulmonary:      Effort: No respiratory distress.      Comments: Shallow resp  Apneic periods  Musculoskeletal:      Right lower leg: No edema.      Left lower leg: No edema.   Skin:     General: Skin is warm and dry.      Capillary Refill: Capillary refill takes less than 2 seconds.   Neurological:      Mental Status: He is unresponsive.      GCS: GCS eye subscore is 1. GCS verbal subscore is 1. GCS motor subscore is 2.      Comments: Obtunded  Non purposeful Rapid eye movement         Medications:  Continuousmorphine, Last Rate: 2 mg/hr (11/05/24 1305)    Scheduledartificial tears, 1 drop, Q4H  diazePAM, 5 mg, Q8H  scopolamine, 1 patch, Q3 Days    PRNacetaminophen, 650 mg, Q6H PRN  artificial tears, 1 drop, PRN  bisacodyL, 10 mg, Daily PRN  haloperidol lactate, 2 mg, Q4H PRN  lorazepam, 1 mg, Q4H PRN  morphine, 2 mg, Q30 Min PRN  sodium chloride 0.9%, 10 mL, PRN      Today I personally reviewed pertinent medications, lines/drains/airways, imaging, cardiology results, laboratory results,  microbiology results,     Diet  No diet orders on file  Palliative Exam  Advance Care Planning

## 2024-11-07 NOTE — PLAN OF CARE
19:42 Bedside Handoff report received. Patient is resting both eyes are slightly open and wandering. Rechecked respiration at 12 bpm, even and unlabored. On Cont Morphine drip infusing 2mg/hr. No sign of distress or discomfort noted at the moment. No suspected seizure activity noted at this time. Head of the bed elevated. Suprapubic patino is intact. Underpad placed below the penis for protection. Ongoing plan of care and monitoring for any changes.    12:12 Reassessed patient condition. Patient is resting both eyes are slightly open. No seizure like activity noted. No sign of distress or discomfort. Still on Cont Morphine drip infusing 2mg/hr. Dim lighted room applied. Ongoing monitoring for any changes.    4:13 Patient is resting, both eyes are slightly open. No sign of distress or discomfort noted. Ongoing plan of care.    Problem: Adult Inpatient Plan of Care  Goal: Plan of Care Review  Outcome: Progressing  Goal: Patient-Specific Goal (Individualized)  Outcome: Progressing  Goal: Absence of Hospital-Acquired Illness or Injury  Outcome: Progressing  Goal: Optimal Comfort and Wellbeing  Outcome: Progressing  Goal: Readiness for Transition of Care  Outcome: Progressing     Problem: Wound  Goal: Optimal Coping  Outcome: Progressing  Goal: Optimal Functional Ability  Outcome: Progressing  Goal: Absence of Infection Signs and Symptoms  Outcome: Progressing  Goal: Improved Oral Intake  Outcome: Progressing  Goal: Optimal Pain Control and Function  Outcome: Progressing  Goal: Skin Health and Integrity  Outcome: Progressing  Goal: Optimal Wound Healing  Outcome: Progressing     Problem: Palliative Care  Goal: Enhanced Quality of Life  Outcome: Progressing     Problem: End-of-Life Care  Goal: Comfort, Peace and Preserved Dignity  Outcome: Progressing

## 2024-11-08 NOTE — SUBJECTIVE & OBJECTIVE
Interval History:  unresponsive; remains on morphine infusion at 2 mg/hr    Ativan given yesterday for concern of underlying NCSE; improvement in stiffness and eye movement; scheduled valium given    Family visited yesterday    Review of Systems   Unable to perform ROS: Mental status change     Objective:     Vitals:  Temp: 98.7 °F (37.1 °C)  Pulse: 80  BP: 117/77  MAP (mmHg): 93  Resp: 14  SpO2: (!) 0 % (unable to obtain)    Temp  Min: 98.7 °F (37.1 °C)  Max: 100 °F (37.8 °C)  Pulse  Min: 80  Max: 95  BP  Min: 117/77  Max: 187/115  MAP (mmHg)  Min: 93  Max: 137  Resp  Min: 5  Max: 14  SpO2  Min: 0 %  Max: 0 %    11/07 0701 - 11/08 0700  In: -   Out: 530 [Urine:530]            Physical Exam  Vitals and nursing note reviewed.   Constitutional:       Appearance: He is ill-appearing.   HENT:      Head: Normocephalic and atraumatic.   Cardiovascular:      Rate and Rhythm: Normal rate and regular rhythm.   Pulmonary:      Effort: No respiratory distress.      Comments: Shallow resp  Apneic periods  Musculoskeletal:      Right lower leg: No edema.      Left lower leg: No edema.   Skin:     General: Skin is warm and dry.      Capillary Refill: Capillary refill takes less than 2 seconds.   Neurological:      Mental Status: He is unresponsive.      GCS: GCS eye subscore is 1. GCS verbal subscore is 1. GCS motor subscore is 2.      Comments: Obtunded  Non purposeful Rapid eye movement         Medications:  Continuousmorphine, Last Rate: 2 mg/hr (11/07/24 1538)    Scheduledartificial tears, 1 drop, Q4H  diazePAM, 5 mg, Q8H  [START ON 11/9/2024] scopolamine, 1 patch, Q3 Days    PRNacetaminophen, 650 mg, Q6H PRN  artificial tears, 1 drop, PRN  bisacodyL, 10 mg, Daily PRN  haloperidol lactate, 2 mg, Q4H PRN  lorazepam, 1 mg, Q4H PRN  morphine, 2 mg, Q30 Min PRN  sodium chloride 0.9%, 10 mL, PRN      Today I personally reviewed pertinent medications, lines/drains/airways, imaging, cardiology results, laboratory results,  microbiology results,     Diet  No diet orders on file  Palliative Exam  Advance Care Planning

## 2024-11-08 NOTE — PLAN OF CARE
19:50 Bedside handoff report received with day shift nurse LINDA Morales. Patient is resting both eyes are slightly open and wandering. Patient is unresponsive to verbal or touch stimuli. Patient respiration is even and unlabored and does not showed any pain behaviors. Suprapubic catheter are intact and dressing changed. Underpad are placed beneath the penile shaft incase of leakage. Penile skin tear are noted. On Cont Morphine drip infusing at 2mg/hr. Ongoing plan of care and monitoring for any changes.    12:13 Patient is resting. No sign of discomfort or distress noted.    4:30 Reassessed patient condition. Patient is resting both eyes are slightly open. No sign of distress or discomfort noted. Oral care performed. Dressing changed. Ongoing plan of care and monitoring for any changes.    Problem: Adult Inpatient Plan of Care  Goal: Plan of Care Review  Outcome: Progressing  Goal: Patient-Specific Goal (Individualized)  Outcome: Progressing  Goal: Absence of Hospital-Acquired Illness or Injury  Outcome: Progressing  Goal: Optimal Comfort and Wellbeing  Outcome: Progressing  Goal: Readiness for Transition of Care  Outcome: Progressing     Problem: Palliative Care  Goal: Enhanced Quality of Life  Outcome: Progressing     Problem: Wound  Goal: Optimal Coping  Outcome: Progressing  Goal: Optimal Functional Ability  Outcome: Progressing  Goal: Absence of Infection Signs and Symptoms  Outcome: Progressing  Goal: Improved Oral Intake  Outcome: Progressing  Goal: Optimal Pain Control and Function  Outcome: Progressing  Goal: Skin Health and Integrity  Outcome: Progressing  Goal: Optimal Wound Healing  Outcome: Progressing

## 2024-11-08 NOTE — SUBJECTIVE & OBJECTIVE
Interval History:  unresponsive; remains on morphine infusion at 2 mg/hr    Ativan given yesterday for concern of underlying NCSE; improvement in stiffness and eye movement    Sister at bedside    Admitted to [facility] today    Review of Systems   Unable to perform ROS: Mental status change     Objective:     Vitals:  Temp: 98.7 °F (37.1 °C)  Pulse: 80  BP: 117/77  MAP (mmHg): 93  Resp: 14  SpO2: (!) 0 % (unable to obtain)    Temp  Min: 98.7 °F (37.1 °C)  Max: 100 °F (37.8 °C)  Pulse  Min: 80  Max: 95  BP  Min: 117/77  Max: 187/115  MAP (mmHg)  Min: 93  Max: 137  Resp  Min: 5  Max: 14  SpO2  Min: 0 %  Max: 0 %    11/07 0701 - 11/08 0700  In: -   Out: 530 [Urine:530]            Physical Exam  Vitals and nursing note reviewed.   Constitutional:       Appearance: He is ill-appearing.   HENT:      Head: Normocephalic and atraumatic.   Cardiovascular:      Rate and Rhythm: Normal rate and regular rhythm.   Pulmonary:      Effort: No respiratory distress.      Comments: Shallow resp  Apneic periods  Musculoskeletal:      Right lower leg: No edema.      Left lower leg: No edema.   Skin:     General: Skin is warm and dry.      Capillary Refill: Capillary refill takes less than 2 seconds.   Neurological:      Mental Status: He is unresponsive.      GCS: GCS eye subscore is 1. GCS verbal subscore is 1. GCS motor subscore is 2.      Comments: Obtunded  Non purposeful Rapid eye movement         Medications:  Continuousmorphine, Last Rate: 2 mg/hr (11/07/24 1538)    Scheduledartificial tears, 1 drop, Q4H  diazePAM, 5 mg, Q8H  [START ON 11/9/2024] scopolamine, 1 patch, Q3 Days    PRNacetaminophen, 650 mg, Q6H PRN  artificial tears, 1 drop, PRN  bisacodyL, 10 mg, Daily PRN  haloperidol lactate, 2 mg, Q4H PRN  lorazepam, 1 mg, Q4H PRN  morphine, 2 mg, Q30 Min PRN  sodium chloride 0.9%, 10 mL, PRN      Today I personally reviewed pertinent medications, lines/drains/airways, imaging, cardiology results, laboratory results, microbiology  results,     Diet  No diet orders on file  Palliative Exam  Advance Care Planning

## 2024-11-08 NOTE — PLAN OF CARE
Problem: Palliative Care  Goal: Enhanced Quality of Life  Outcome: Progressing     Problem: Pain Acute  Goal: Optimal Pain Control and Function  Outcome: Progressing      [FreeTextEntry1] : Ms. Dacia Barron (Chrissy) is a very pleasant 46 year old female with a history of TBI, chronic neck pain who presents for follow-up of pain, pressure of her right scalp with a right skull lesion.  She was initially seen in April and at that time was recommended for observation.  She now returns and reports that her symptoms have persisted and continued to worsen with continued numbness and pressure at the site of the skull lesion. No new neurological deficits.

## 2024-11-08 NOTE — PROGRESS NOTES
[facility]  Palliative Medicine  Progress Note    Patient Name: Irvin Nuñez Jr.  MRN: [id number]  Admission Date: 11/7/2024  Hospital Length of Stay: 1 days  Code Status: DNR   Attending Provider: Nilo Aburto MD  Consulting Provider: Nilo Aburto MD  Primary Care Physician: Alberto Lundberg Jr., MD  Principal Problem:<principal problem not specified>    Patient information was obtained from  nursing .      Assessment/Plan:     Palliative Care  Comfort measures only status  Hospice Qualifying Diagnosis: Cardiac arrest and anoxic brain injury        Symptom Assessment:  Overall, patient continues to have ongoing clinical deterioration related to the terminal diagnosis        - Pain: controlled  - Dyspnea: controlled  - Agitation: controlled  - Mentation: unresponsive     Current Medications:       Current Facility-Administered Medications:     acetaminophen suppository 650 mg, 650 mg, Rectal, Q6H PRN, iNlo Aburto MD    artificial tears 0.5 % ophthalmic solution 1 drop, 1 drop, Both Eyes, Q4H, Nilo Aburto MD, 1 drop at 11/07/24 0600    artificial tears 0.5 % ophthalmic solution 1 drop, 1 drop, Both Eyes, PRN, Nilo Aburto MD    bisacodyL suppository 10 mg, 10 mg, Rectal, Daily PRN, Nilo Aburto MD    diazePAM injection 5 mg, 5 mg, Intravenous, Q8H, Nilo Aburto MD    haloperidol lactate injection 2 mg, 2 mg, Intravenous, Q4H PRN, Nilo Aburto MD    LORazepam injection 1 mg, 1 mg, Intravenous, Q4H PRN, Nilo Aburto MD, 1 mg at 11/06/24 1113    morphine 100 mg (1 mg/mL) in NACL 100 mL infusion (TITRATING), 0-10 mg/hr, Intravenous, Continuous, Izabel Jones PA-C, Last Rate: 2 mL/hr at 11/05/24 1305, 2 mg/hr at 11/05/24 1305    morphine IV bolus from bag/infusion 2 mg, 2 mg, Intravenous, Q30 Min PRN, Nilo Aburto MD    scopolamine 1.3-1.5 mg (1 mg over 3 days) 1 patch, 1 patch, Transdermal, Q3 Days, Soraida Dumont PA-C, 1 patch at  11/06/24 1500    sodium chloride 0.9% flush 10 mL, 10 mL, Intravenous, PRN, Maya Cruz PA-C        Family discussion updates:  Comfort focused care plan     Plan of care:  Continues to qualify for [facility] level of hospice care     Follow up plans:   - Symptomatic condition is not stabilized.   - planning to admit to [facility] level of hospice due to ongoing required parenteral interventions        Advanced Directives::  Living Will: No  [place]: No  Do Not Resuscitate Status: Yes  Surrogate Decision maker / Medical Power of :   Maryse Campos  Sister  Emergency Contact  [phone]        Comfort care plan:  Dyspnea / Secretions  - O2 per nasal cannula as needed for comfort  - Morphine 2mg/hr infusion and 2 mg IV q1h PRN dyspnea or breakthrough pain  - Lorazepam 1 mg IV q1h PRN dyspnea refractory to opioids, anxiety  - Glycopyrrolate 0.2 mg IV q4h PRN secretions  - Frequent oral care  - Gentle repositioning of head to shift oropharyngeal secretions out of airway  - Yankauer suction at bedside  - Fan at bedside  - Avoid artificial hydration     Pain  - Opioids as above     Seizures  - ativan 1 mg q 1 hour prn; trial dose now for possible underlying NCSE  - schedule valium 5 mg tid     Agitation / Delirium / Anxiety  - Gentle reorientation techniques, minimize environmental stimuli  - Discontinue laboratory draws and remove non-essential lines  - Treat for pain/dyspnea as above  - Lorazepam 1 mg IV q1h PRN anxiety, dyspnea refractory to opioids  - Haldol 2 mg IV q4h PRN agitation     Nutrition / Hydration  - Discontinue IV fluids and artificial nutrition  - Comfort feedings as desired and tolerated  - Simplify medication regimen by eliminating those medications that provide little to no benefit at end-of-life     Nausea  - Ondansetron 4 mg IV q6h PRN nausea/vomiting     Bowel Regimen  - Bisacodyl 10 mg suppository daily PRN constipation  - Will not prioritize at end-of-life     Fever  - Apply cold compresses  -  Acetaminophen 650 mg suppository q4h PRN fever  - Fan at bedside        Subjective:     Chief Complaint: No chief complaint on file.      HPI:   Irvin Nuñez is a 75 yo male with PMHx HTN, HLD, schizophrenia, and vascular dementia who was transferred from OSH ED to [facility] for seizure-like activity post cardiac arrest. Per family at OSH, patient has been having syncopal episodes for the past 4 months which have become much more frequent in the past 1-2 months. Today patient had another syncopal episode and collapsed at home. CPR was initiated by family and received 6 rounds of epi while being transported via [place]. Patient was intubated and ROSC achieved en route. CT head neg for acute intracranial processes. Echo showed EF 67%. EKG NSR. Lactic acid and trop elevated. UA positive for nitrites, leukocyte esterase, WBC, and bacteria. K 3.2. ABG with metabolic acidosis with partial respiratory compensation. While at OSH ED, patient had intermittent twitching of the head and eyes concerning for possible seizure like activity. He was kept on prop 30 for suppression of any possible seizure activity. He was transferred to [facility] for further workup and higher level of care.      Hospital Course by Event: 10/31/2024: Rewarming started at 4AM, achieved target temp at 7:58AM. EEG with diffusely suppressed background. MRI consistent with global anoxic injury and obstructive hydrocephalus. Made DNR after conversation with brother and sister at bedside (see ACP note.)  11/01/2024: Overnight patient required both cooling and Jessica hugger to maintain normothermia. Fent started due to tachypnea, tachycardia, vent dyssynchrony. Planning for family meeting Sunday.  11/02/2024: SASHA. [facility] ordered for overnight followed by family meeting tomorrow.   11/3: family meeting today for GOC and prognostication  11/4/2024 continue comfort care, d/c to inpatient hospice     Admitted to [facility] hospice 11/7 for ongoing inpt management needs for optimal  symptom control    Hospital Course:  No notes on file    Interval History:  unresponsive; remains on morphine infusion at 2 mg/hr    Ativan given yesterday for concern of underlying NCSE; improvement in stiffness and eye movement; scheduled valium given    Family visited yesterday    Review of Systems   Unable to perform ROS: Mental status change     Objective:     Vitals:  Temp: 98.7 °F (37.1 °C)  Pulse: 80  BP: 117/77  MAP (mmHg): 93  Resp: 14  SpO2: (!) 0 % (unable to obtain)    Temp  Min: 98.7 °F (37.1 °C)  Max: 100 °F (37.8 °C)  Pulse  Min: 80  Max: 95  BP  Min: 117/77  Max: 187/115  MAP (mmHg)  Min: 93  Max: 137  Resp  Min: 5  Max: 14  SpO2  Min: 0 %  Max: 0 %    11/07 0701 - 11/08 0700  In: -   Out: 530 [Urine:530]            Physical Exam  Vitals and nursing note reviewed.   Constitutional:       Appearance: He is ill-appearing.   HENT:      Head: Normocephalic and atraumatic.   Cardiovascular:      Rate and Rhythm: Normal rate and regular rhythm.   Pulmonary:      Effort: No respiratory distress.      Comments: Shallow resp  Apneic periods  Musculoskeletal:      Right lower leg: No edema.      Left lower leg: No edema.   Skin:     General: Skin is warm and dry.      Capillary Refill: Capillary refill takes less than 2 seconds.   Neurological:      Mental Status: He is unresponsive.      GCS: GCS eye subscore is 1. GCS verbal subscore is 1. GCS motor subscore is 2.      Comments: Obtunded  Non purposeful Rapid eye movement         Medications:  Continuousmorphine, Last Rate: 2 mg/hr (11/07/24 1538)    Scheduledartificial tears, 1 drop, Q4H  diazePAM, 5 mg, Q8H  [START ON 11/9/2024] scopolamine, 1 patch, Q3 Days    PRNacetaminophen, 650 mg, Q6H PRN  artificial tears, 1 drop, PRN  bisacodyL, 10 mg, Daily PRN  haloperidol lactate, 2 mg, Q4H PRN  lorazepam, 1 mg, Q4H PRN  morphine, 2 mg, Q30 Min PRN  sodium chloride 0.9%, 10 mL, PRN      Today I personally reviewed pertinent medications, lines/drains/airways,  imaging, cardiology results, laboratory results, microbiology results,     Diet  No diet orders on file  Palliative Exam  Advance Care Planning    Nilo Aburto MD  Palliative Medicine  [facility] - Hospice and Palliative Medicine

## 2024-11-08 NOTE — NURSING
0715 - Report received. Care assumed. Pt with eyes close but opens eyes spontaneously to voice. Nonverbal. AM care provided. Appears comfortable at this time. Will continue to assess.    0915 - Dr. Aburto at bedside.

## 2024-11-08 NOTE — HPI
Irvin Nuñez is a 73 yo male with PMHx HTN, HLD, schizophrenia, and vascular dementia who was transferred [facility] ED [place] [facility] [place] seizure-like activity post cardiac arrest. Per family at OSH, patient has been having syncopal episodes for the past 4 months which have become much more frequent in the past 1-2 months. Today patient had another syncopal episode and collapsed at home. CPR was initiated by family and received 6 rounds of epi while being transported via [place]. Patient was intubated and ROSC achieved en route. CT head neg for acute intracranial processes. Echo showed EF 67%. EKG NSR. Lactic acid and trop elevated. UA positive for nitrites, leukocyte esterase, WBC, and bacteria. K 3.2. ABG with metabolic acidosis with partial respiratory compensation. While at OSH ED, patient had intermittent twitching of the head and eyes concerning for possible seizure like activity. He was kept on prop 30 for suppression of any possible seizure activity. He was transferred [place] [facility] [place] further workup and higher level of care.      Hospital Course by Event: 10/31/2024: Rewarming started at 4AM, achieved target temp at 7:58AM. EEG with diffusely suppressed background. MRI consistent with global anoxic injury and obstructive hydrocephalus. Made DNR after conversation with brother and sister at bedside (see ACP note.)  11/01/2024: Overnight patient required both cooling and Jessica hugger to maintain normothermia. Fent started due to tachypnea, tachycardia, vent dyssynchrony. Planning for family meeting Sunday.  11/02/2024: SASHA. [facility] ordered for overnight followed by family meeting tomorrow.   11/3: family meeting today for GOC and prognostication  11/4/2024 continue comfort care, d/c to inpatient hospice     Admitted to [facility] hospice 11/7 for ongoing inpt management needs for optimal symptom control

## 2024-11-08 NOTE — H&P
[facility] - [facility] and [facility]  Palliative Medicine  History & Physical    Patient Name: Irvin Nuñez Jr.  MRN: [id number]  Admission Date: 11/7/2024  Attending Physician: Nilo Aburto MD  Primary Care Provider: Alberto Lundberg Jr., MD    Patient information was obtained from past medical records and nursing .     Subjective:     Chief Complaint/Reason for Admission: anoxic brain injury after cardiac arrest    History of Present Illness: Irvin Nuñez is a 75 yo male with PMHx HTN, HLD, schizophrenia, and vascular dementia who was transferred from OSH ED to [facility] for seizure-like activity post cardiac arrest. Per family at OSH, patient has been having syncopal episodes for the past 4 months which have become much more frequent in the past 1-2 months. Today patient had another syncopal episode and collapsed at home. CPR was initiated by family and received 6 rounds of epi while being transported via [place]. Patient was intubated and ROSC achieved en route. CT head neg for acute intracranial processes. Echo showed EF 67%. EKG NSR. Lactic acid and trop elevated. UA positive for nitrites, leukocyte esterase, WBC, and bacteria. K 3.2. ABG with metabolic acidosis with partial respiratory compensation. While at OSH ED, patient had intermittent twitching of the head and eyes concerning for possible seizure like activity. He was kept on prop 30 for suppression of any possible seizure activity. He was transferred to [facility] for further workup and higher level of care.      Hospital Course by Event: 10/31/2024: Rewarming started at 4AM, achieved target temp at 7:58AM. EEG with diffusely suppressed background. MRI consistent with global anoxic injury and obstructive hydrocephalus. Made DNR after conversation with brother and sister at bedside (see ACP note.)  11/01/2024: Overnight patient required both cooling and Jessica hugger to maintain normothermia. Fent started due to tachypnea, tachycardia, vent dyssynchrony.  Planning for family meeting Sunday.  11/02/2024: [facility] ordered for overnight followed by family meeting tomorrow.   11/3: family meeting today for GOC and prognostication  11/4/2024 continue comfort care, d/c to inpatient hospice     Admitted to [facility] hospice 11/7 for ongoing inpt management needs for optimal symptom control    Interval History:  unresponsive; remains on morphine infusion at 2 mg/hr    Ativan given yesterday for concern of underlying NCSE; improvement in stiffness and eye movement    Sister at bedside    Admitted to [facility] today    Review of Systems   Unable to perform ROS: Mental status change     Objective:     Vitals:  Temp: 98.7 °F (37.1 °C)  Pulse: 80  BP: 117/77  MAP (mmHg): 93  Resp: 14  SpO2: (!) 0 % (unable to obtain)    Temp  Min: 98.7 °F (37.1 °C)  Max: 100 °F (37.8 °C)  Pulse  Min: 80  Max: 95  BP  Min: 117/77  Max: 187/115  MAP (mmHg)  Min: 93  Max: 137  Resp  Min: 5  Max: 14  SpO2  Min: 0 %  Max: 0 %    11/07 0701 - 11/08 0700  In: -   Out: 530 [Urine:530]            Physical Exam  Vitals and nursing note reviewed.   Constitutional:       Appearance: He is ill-appearing.   HENT:      Head: Normocephalic and atraumatic.   Cardiovascular:      Rate and Rhythm: Normal rate and regular rhythm.   Pulmonary:      Effort: No respiratory distress.      Comments: Shallow resp  Apneic periods  Musculoskeletal:      Right lower leg: No edema.      Left lower leg: No edema.   Skin:     General: Skin is warm and dry.      Capillary Refill: Capillary refill takes less than 2 seconds.   Neurological:      Mental Status: He is unresponsive.      GCS: GCS eye subscore is 1. GCS verbal subscore is 1. GCS motor subscore is 2.      Comments: Obtunded  Non purposeful Rapid eye movement         Medications:  Continuousmorphine, Last Rate: 2 mg/hr (11/07/24 1538)    Scheduledartificial tears, 1 drop, Q4H  diazePAM, 5 mg, Q8H  [START ON 11/9/2024] scopolamine, 1 patch, Q3 Days    PRNacetaminophen, 650 mg, Q6H  PRN  artificial tears, 1 drop, PRN  bisacodyL, 10 mg, Daily PRN  haloperidol lactate, 2 mg, Q4H PRN  lorazepam, 1 mg, Q4H PRN  morphine, 2 mg, Q30 Min PRN  sodium chloride 0.9%, 10 mL, PRN      Today I personally reviewed pertinent medications, lines/drains/airways, imaging, cardiology results, laboratory results, microbiology results,     Diet  No diet orders on file  Palliative Exam  Advance Care Planning  Assessment/Plan:     Comfort measures only status  Hospice Qualifying Diagnosis: Cardiac arrest and anoxic brain injury        Symptom Assessment:  Overall, patient continues to have ongoing clinical deterioration related to the terminal diagnosis        - Pain: controlled  - Dyspnea: controlled  - Agitation: controlled  - Mentation: unresponsive     Current Medications:       Current Facility-Administered Medications:     acetaminophen suppository 650 mg, 650 mg, Rectal, Q6H PRN, Nilo Aburto MD    artificial tears 0.5 % ophthalmic solution 1 drop, 1 drop, Both Eyes, Q4H, Nilo Aburto MD, 1 drop at 11/07/24 0600    artificial tears 0.5 % ophthalmic solution 1 drop, 1 drop, Both Eyes, PRN, Nilo Aburto MD    bisacodyL suppository 10 mg, 10 mg, Rectal, Daily PRN, Nilo Aburto MD    diazePAM injection 5 mg, 5 mg, Intravenous, Q8H, Nilo Aburto MD    haloperidol lactate injection 2 mg, 2 mg, Intravenous, Q4H PRN, Nilo Aburto MD    LORazepam injection 1 mg, 1 mg, Intravenous, Q4H PRN, Nilo Aburto MD, 1 mg at 11/06/24 1113    morphine 100 mg (1 mg/mL) in NACL 100 mL infusion (TITRATING), 0-10 mg/hr, Intravenous, Continuous, Izabel Jones PA-C, Last Rate: 2 mL/hr at 11/05/24 1305, 2 mg/hr at 11/05/24 1305    morphine IV bolus from bag/infusion 2 mg, 2 mg, Intravenous, Q30 Min PRN, Nilo Aburto MD    scopolamine 1.3-1.5 mg (1 mg over 3 days) 1 patch, 1 patch, Transdermal, Q3 Days, Soraida Dumont PA-C, 1 patch at 11/06/24 1500    sodium chloride 0.9% flush 10 mL, 10  mL, Intravenous, PRN, Maya Cruz PA-C        Family discussion updates:  Comfort focused care plan     Plan of care:  Continues to qualify for [facility] level of hospice care     Follow up plans:   - Symptomatic condition is not stabilized.   - planning to admit to [facility] level of hospice due to ongoing required parenteral interventions        Advanced Directives::  Living Will: No  LaPOST: No  Do Not Resuscitate Status: Yes  Surrogate Decision maker / Medical Power of :   Maryse aCmpos  Sister  Emergency Contact  [phone]        Comfort care plan:  Dyspnea / Secretions  - O2 per nasal cannula as needed for comfort  - Morphine 2mg/hr infusion and 2 mg IV q1h PRN dyspnea or breakthrough pain  - Lorazepam 1 mg IV q1h PRN dyspnea refractory to opioids, anxiety  - Glycopyrrolate 0.2 mg IV q4h PRN secretions  - Frequent oral care  - Gentle repositioning of head to shift oropharyngeal secretions out of airway  - Yankauer suction at bedside  - Fan at bedside  - Avoid artificial hydration     Pain  - Opioids as above     Seizures  - ativan 1 mg q 1 hour prn; trial dose now for possible underlying NCSE  - schedule valium 5 mg tid     Agitation / Delirium / Anxiety  - Gentle reorientation techniques, minimize environmental stimuli  - Discontinue laboratory draws and remove non-essential lines  - Treat for pain/dyspnea as above  - Lorazepam 1 mg IV q1h PRN anxiety, dyspnea refractory to opioids  - Haldol 2 mg IV q4h PRN agitation     Nutrition / Hydration  - Discontinue IV fluids and artificial nutrition  - Comfort feedings as desired and tolerated  - Simplify medication regimen by eliminating those medications that provide little to no benefit at end-of-life     Nausea  - Ondansetron 4 mg IV q6h PRN nausea/vomiting     Bowel Regimen  - Bisacodyl 10 mg suppository daily PRN constipation  - Will not prioritize at end-of-life     Fever  - Apply cold compresses  - Acetaminophen 650 mg suppository q4h PRN fever  - Fan at  bedside      VTE Risk Mitigation (From admission, onward)           Ordered     IP VTE HIGH RISK PATIENT  Once         11/07/24 1523                    Nilo Aburto MD  Palliative Medicine  [facility] - Hospice and Palliative Medicine

## 2024-11-08 NOTE — ASSESSMENT & PLAN NOTE
Hospice Qualifying Diagnosis: Cardiac arrest and anoxic brain injury        Symptom Assessment:  Overall, patient continues to have ongoing clinical deterioration related to the terminal diagnosis        - Pain: controlled  - Dyspnea: controlled  - Agitation: controlled  - Mentation: unresponsive     Current Medications:       Current Facility-Administered Medications:     acetaminophen suppository 650 mg, 650 mg, Rectal, Q6H PRN, Nilo Aburto MD    artificial tears 0.5 % ophthalmic solution 1 drop, 1 drop, Both Eyes, Q4H, Nilo Aburto MD, 1 drop at 11/07/24 0600    artificial tears 0.5 % ophthalmic solution 1 drop, 1 drop, Both Eyes, PRN, Nilo Aburto MD    bisacodyL suppository 10 mg, 10 mg, Rectal, Daily PRN, Nilo Aburto MD    diazePAM injection 5 mg, 5 mg, Intravenous, Q8H, Nilo Aburto MD    haloperidol lactate injection 2 mg, 2 mg, Intravenous, Q4H PRN, Nilo Aburto MD    LORazepam injection 1 mg, 1 mg, Intravenous, Q4H PRN, Nilo Aburto MD, 1 mg at 11/06/24 1113    morphine 100 mg (1 mg/mL) in NACL 100 mL infusion (TITRATING), 0-10 mg/hr, Intravenous, Continuous, Izabel Jones PA-C, Last Rate: 2 mL/hr at 11/05/24 1305, 2 mg/hr at 11/05/24 1305    morphine IV bolus from bag/infusion 2 mg, 2 mg, Intravenous, Q30 Min PRN, Nilo Aburto MD    scopolamine 1.3-1.5 mg (1 mg over 3 days) 1 patch, 1 patch, Transdermal, Q3 Days, Soraida Dumont PA-C, 1 patch at 11/06/24 1500    sodium chloride 0.9% flush 10 mL, 10 mL, Intravenous, PRN, Maya Cruz PA-C        Family discussion updates:  Comfort focused care plan     Plan of care:  Continues to qualify for [facility] level of hospice care     Follow up plans:   - Symptomatic condition is not stabilized.   - planning to admit to [facility] level of hospice due to ongoing required parenteral interventions        Advanced Directives::  Living Will: No  LaPOST: No  Do Not Resuscitate Status: Yes  Surrogate Decision maker / Medical  Power of :   Maryse Campos  Sister  Emergency Contact  [phone]        Comfort care plan:  Dyspnea / Secretions  - O2 per nasal cannula as needed for comfort  - Morphine 2mg/hr infusion and 2 mg IV q1h PRN dyspnea or breakthrough pain  - Lorazepam 1 mg IV q1h PRN dyspnea refractory to opioids, anxiety  - Glycopyrrolate 0.2 mg IV q4h PRN secretions  - Frequent oral care  - Gentle repositioning of head to shift oropharyngeal secretions out of airway  - Yankauer suction at bedside  - Fan at bedside  - Avoid artificial hydration     Pain  - Opioids as above     Seizures  - ativan 1 mg q 1 hour prn; trial dose now for possible underlying NCSE  - schedule valium 5 mg tid     Agitation / Delirium / Anxiety  - Gentle reorientation techniques, minimize environmental stimuli  - Discontinue laboratory draws and remove non-essential lines  - Treat for pain/dyspnea as above  - Lorazepam 1 mg IV q1h PRN anxiety, dyspnea refractory to opioids  - Haldol 2 mg IV q4h PRN agitation     Nutrition / Hydration  - Discontinue IV fluids and artificial nutrition  - Comfort feedings as desired and tolerated  - Simplify medication regimen by eliminating those medications that provide little to no benefit at end-of-life     Nausea  - Ondansetron 4 mg IV q6h PRN nausea/vomiting     Bowel Regimen  - Bisacodyl 10 mg suppository daily PRN constipation  - Will not prioritize at end-of-life     Fever  - Apply cold compresses  - Acetaminophen 650 mg suppository q4h PRN fever  - Fan at bedside

## 2024-11-08 NOTE — PLAN OF CARE
Problem: Adult Inpatient Plan of Care  Goal: Plan of Care Review  Outcome: Progressing  Flowsheets (Taken 11/8/2024 1719)  Plan of Care Reviewed With: patient  Goal: Absence of Hospital-Acquired Illness or Injury  Outcome: Progressing  Intervention: Identify and Manage Fall Risk  Flowsheets (Taken 11/8/2024 1719)  Safety Promotion/Fall Prevention:   bed alarm set   side rails raised x 3  Intervention: Prevent Skin Injury  Flowsheets (Taken 11/8/2024 1719)  Body Position:   turned   heels elevated  Skin Protection:   incontinence pads utilized   silicone foam dressing in place   skin sealant/moisture barrier applied  Device Skin Pressure Protection:   absorbent pad utilized/changed   positioning supports utilized   pressure points protected   tubing/devices free from skin contact  Intervention: Prevent Infection  Flowsheets (Taken 11/8/2024 1719)  Infection Prevention:   environmental surveillance performed   equipment surfaces disinfected   hand hygiene promoted   single patient room provided  Goal: Optimal Comfort and Wellbeing  Outcome: Progressing  Intervention: Monitor Pain and Promote Comfort  Flowsheets (Taken 11/8/2024 1719)  Pain Management Interventions:   care clustered   quiet environment facilitated  Intervention: Provide Person-Centered Care  Flowsheets (Taken 11/8/2024 1719)  Trust Relationship/Rapport: care explained     Problem: Infection  Goal: Absence of Infection Signs and Symptoms  Outcome: Progressing  Intervention: Prevent or Manage Infection  Flowsheets (Taken 11/8/2024 1719)  Isolation Precautions:   precautions maintained   contact     Problem: Palliative Care  Goal: Enhanced Quality of Life  Outcome: Progressing  Intervention: Maximize Comfort  Flowsheets (Taken 11/8/2024 1719)  Pain Management Interventions:   care clustered   quiet environment facilitated  Oral Care:   oral rinse provided   lip/mouth moisturizer applied     Problem: Pain Acute  Goal: Optimal Pain Control and  Function  Outcome: Progressing  Intervention: Develop Pain Management Plan  Flowsheets (Taken 11/8/2024 1719)  Pain Management Interventions:   care clustered   quiet environment facilitated  Intervention: Prevent or Manage Pain  Flowsheets (Taken 11/8/2024 1719)  Medication Review/Management:   medications reviewed   high-risk medications identified     Problem: Fall Injury Risk  Goal: Absence of Fall and Fall-Related Injury  Outcome: Progressing  Intervention: Identify and Manage Contributors  Flowsheets (Taken 11/8/2024 1719)  Medication Review/Management:   medications reviewed   high-risk medications identified  Intervention: Promote Injury-Free Environment  Flowsheets (Taken 11/8/2024 1719)  Safety Promotion/Fall Prevention:   bed alarm set   side rails raised x 3     Problem: Skin Injury Risk Increased  Goal: Skin Health and Integrity  Outcome: Progressing  Intervention: Optimize Skin Protection  Flowsheets (Taken 11/8/2024 1719)  Pressure Reduction Techniques:   heels elevated off bed   pressure points protected   weight shift assistance provided  Pressure Reduction Devices:   foam padding utilized   positioning supports utilized   pressure-redistributing mattress utilized  Skin Protection:   incontinence pads utilized   silicone foam dressing in place   skin sealant/moisture barrier applied  Activity Management: Rolling - L1  Head of Bed (HOB) Positioning: HOB at 20 degrees

## 2024-11-08 NOTE — ASSESSMENT & PLAN NOTE
Hospice Qualifying Diagnosis: Cardiac arrest and anoxic brain injury        Symptom Assessment:  Overall, patient continues to have ongoing clinical deterioration related to the terminal diagnosis        - Pain: controlled  - Dyspnea: controlled  - Agitation: controlled  - Mentation: unresponsive     Current Medications:       Current Facility-Administered Medications:     acetaminophen suppository 650 mg, 650 mg, Rectal, Q6H PRN, Nilo Aburto MD    artificial tears 0.5 % ophthalmic solution 1 drop, 1 drop, Both Eyes, Q4H, Nilo Aburto MD, 1 drop at 11/07/24 0600    artificial tears 0.5 % ophthalmic solution 1 drop, 1 drop, Both Eyes, PRN, Nilo Aburto MD    bisacodyL suppository 10 mg, 10 mg, Rectal, Daily PRN, Nilo Aburto MD    diazePAM injection 5 mg, 5 mg, Intravenous, Q8H, Nilo Aburto MD    haloperidol lactate injection 2 mg, 2 mg, Intravenous, Q4H PRN, Nilo Aburto MD    LORazepam injection 1 mg, 1 mg, Intravenous, Q4H PRN, Nilo Aburto MD, 1 mg at 11/06/24 1113    morphine 100 mg (1 mg/mL) in NACL 100 mL infusion (TITRATING), 0-10 mg/hr, Intravenous, Continuous, Izabel Jones PA-C, Last Rate: 2 mL/hr at 11/05/24 1305, 2 mg/hr at 11/05/24 1305    morphine IV bolus from bag/infusion 2 mg, 2 mg, Intravenous, Q30 Min PRN, Nilo Aburto MD    scopolamine 1.3-1.5 mg (1 mg over 3 days) 1 patch, 1 patch, Transdermal, Q3 Days, Soraida Dumont PA-C, 1 patch at 11/06/24 1500    sodium chloride 0.9% flush 10 mL, 10 mL, Intravenous, PRN, Maya Cruz PA-C        Family discussion updates:  Comfort focused care plan     Plan of care:  Continues to qualify for [facility] level of hospice care     Follow up plans:   - Symptomatic condition is not stabilized.   - planning to admit to [facility] level of hospice due to ongoing required parenteral interventions        Advanced Directives::  Living Will: No  LaPOST: No  Do Not Resuscitate Status: Yes  Surrogate Decision maker /  Medical Power of :   Maryse Campos  Sister  Emergency Contact  [phone]        Comfort care plan:  Dyspnea / Secretions  - O2 per nasal cannula as needed for comfort  - Morphine 2mg/hr infusion and 2 mg IV q1h PRN dyspnea or breakthrough pain  - Lorazepam 1 mg IV q1h PRN dyspnea refractory to opioids, anxiety  - Glycopyrrolate 0.2 mg IV q4h PRN secretions  - Frequent oral care  - Gentle repositioning of head to shift oropharyngeal secretions out of airway  - Yankauer suction at bedside  - Fan at bedside  - Avoid artificial hydration     Pain  - Opioids as above     Seizures  - ativan 1 mg q 1 hour prn; trial dose now for possible underlying NCSE  - schedule valium 5 mg tid     Agitation / Delirium / Anxiety  - Gentle reorientation techniques, minimize environmental stimuli  - Discontinue laboratory draws and remove non-essential lines  - Treat for pain/dyspnea as above  - Lorazepam 1 mg IV q1h PRN anxiety, dyspnea refractory to opioids  - Haldol 2 mg IV q4h PRN agitation     Nutrition / Hydration  - Discontinue IV fluids and artificial nutrition  - Comfort feedings as desired and tolerated  - Simplify medication regimen by eliminating those medications that provide little to no benefit at end-of-life     Nausea  - Ondansetron 4 mg IV q6h PRN nausea/vomiting     Bowel Regimen  - Bisacodyl 10 mg suppository daily PRN constipation  - Will not prioritize at end-of-life     Fever  - Apply cold compresses  - Acetaminophen 650 mg suppository q4h PRN fever  - Fan at bedside

## 2024-11-09 PROBLEM — Z51.5 COMFORT MEASURES ONLY STATUS: Status: RESOLVED | Noted: 2024-01-01 | Resolved: 2024-01-01

## 2024-11-09 PROBLEM — Z51.5 HOSPICE CARE: Status: ACTIVE | Noted: 2024-01-01

## 2024-11-09 NOTE — ASSESSMENT & PLAN NOTE
Hospice Qualifying Diagnosis: Cardiac arrest and anoxic brain injury    Interval update 11/09/2024 - family updated at bedside, progressing as expected, comfort maintained with continuous pain medications    Need for inpatient care- Active dying process as evidenced by loss of consciousness, cool extremities, and irregular breathing pattern. Requiring IV opioids and benzodiazepines for pain behaviors, respiratory distress, and agitation. Likely prognosis of hours to short days    Dispo: anticipate EOL care on the unit, if patient unexpectedly stabilizes, will approach family regarding care at home or alternative in-facility hospice care       Symptom Assessment:  Overall, patient continues to have ongoing clinical deterioration related to the terminal diagnosis        - Pain: controlled  - Dyspnea: controlled  - Agitation: controlled  - Mentation: unresponsive        Family discussion updates:  Comfort focused care plan     Plan of care:  Continues to qualify for [facility] level of hospice care     Follow up plans:   - Symptomatic condition is not stabilized.   - planning to admit to [facility] level of hospice due to ongoing required parenteral interventions        Advanced Directives::  Living Will: No  LaPOST: No  Do Not Resuscitate Status: Yes  Surrogate Decision maker / Medical Power of :   Maryse Campos  [facility]  Emergency Contact  [phone]        Comfort care plan:  Dyspnea / Secretions  - O2 per nasal cannula as needed for comfort  - Morphine 2mg/hr infusion and 2 mg IV q1h PRN dyspnea or breakthrough pain  - Lorazepam 1 mg IV q1h PRN dyspnea refractory to opioids, anxiety  - Glycopyrrolate 0.2 mg IV q4h PRN secretions  - Frequent oral care  - Gentle repositioning of head to shift oropharyngeal secretions out of airway  - Yankauer suction at bedside  - Fan at bedside  - Avoid artificial hydration     Pain  - Opioids as above     Seizures  - ativan 1 mg q 1 hour prn; trial dose now for possible underlying  NCSE  - schedule valium 5 mg tid     Agitation / Delirium / Anxiety  - Gentle reorientation techniques, minimize environmental stimuli  - Discontinue laboratory draws and remove non-essential lines  - Treat for pain/dyspnea as above  - Lorazepam 1 mg IV q1h PRN anxiety, dyspnea refractory to opioids  - Haldol 2 mg IV q4h PRN agitation     Nutrition / Hydration  - Discontinue IV fluids and artificial nutrition  - Comfort feedings as desired and tolerated  - Simplify medication regimen by eliminating those medications that provide little to no benefit at end-of-life     Nausea  - Ondansetron 4 mg IV q6h PRN nausea/vomiting     Bowel Regimen  - Bisacodyl 10 mg suppository daily PRN constipation  - Will not prioritize at end-of-life     Fever  - Apply cold compresses  - Acetaminophen 650 mg suppository q4h PRN fever  - Fan at bedside

## 2024-11-09 NOTE — SUBJECTIVE & OBJECTIVE
"Interval History:  Chart reviewed including 24h medication use. Patient resting comfortably in bed, eyes open but minimally responsive. Sister and brother at bedside in the afternoon.    Palliative ROS:  PRNs: morphine continuous infusion 2mg/hr  Pain behaviors- controlled  Respiratory distress- controlled  Agitation- controlled  Seizure-like activity- controlled    Objective:     /88 (BP Location: Right arm, Patient Position: Lying)   Pulse 89   Temp 97.8 °F (36.6 °C) (Axillary)   Resp 11   Ht 5' 9" (1.753 m)   Wt 69.4 kg (153 lb)   SpO2 96%   BMI 22.59 kg/m²        Physical Exam  Vitals and nursing note reviewed.   Constitutional:       Appearance: He is ill-appearing.      Comments: Older, chronically ill-appearing male lying in bed, eyes open but minimally responsive, no observed pain behaviors   Cardiovascular:      Comments: LUE radial pulse intact, RUE radial absent  Pulmonary:      Effort: No respiratory distress.      Comments: Shallow, irregular breathing with apneic periods ~15 seconds  Musculoskeletal:      Right lower leg: No edema.      Left lower leg: No edema.   Skin:     Comments: RUE cold and mottled   Neurological:      Comments: Unresponsive, eyes open, does not track        Medications:  Continuousmorphine, Last Rate: 2 mg/hr (11/07/24 1538)    Scheduledartificial tears, 1 drop, Q4H  diazePAM, 5 mg, Q8H  scopolamine, 1 patch, Q3 Days    PRNacetaminophen, 650 mg, Q6H PRN  artificial tears, 1 drop, PRN  bisacodyL, 10 mg, Daily PRN  haloperidol lactate, 2 mg, Q4H PRN  lorazepam, 1 mg, Q4H PRN  morphine, 2 mg, Q30 Min PRN  sodium chloride 0.9%, 10 mL, PRN        "

## 2024-11-09 NOTE — PLAN OF CARE
19:45 Bedside handoff report received. Patient eyes are open and wandering. No verbal or touch response noted. Patient respiration is even and unlabored. No sign of distress or discomfort. Suprapubic catheter is intact. On Cont Morphine infusing drip 2mg/hr. Ongoing plan of care and monitoring for changes.    12:15 Reassessed patient condition. Patient is resting both eyes are slightly open. Respiration is even and unlabored. No pain behavior noted. Will continue to monitor for any changes.     4:16 Patient is resting. Respiration even and unlabored. No sign of distress or discomfort noted.    Problem: Adult Inpatient Plan of Care  Goal: Plan of Care Review  Outcome: Progressing  Goal: Patient-Specific Goal (Individualized)  Outcome: Progressing  Goal: Absence of Hospital-Acquired Illness or Injury  Outcome: Progressing  Goal: Optimal Comfort and Wellbeing  Outcome: Progressing  Goal: Readiness for Transition of Care  Outcome: Progressing     Problem: Palliative Care  Goal: Enhanced Quality of Life  Outcome: Progressing     Problem: Pain Acute  Goal: Optimal Pain Control and Function  Outcome: Progressing

## 2024-11-10 NOTE — PROGRESS NOTES
[facility]  Palliative Medicine  Progress Note    Patient Name: Irvin Nuñez Jr.  MRN: [id number]  Admission Date: 11/7/2024  Hospital Length of Stay: 3 days  Code Status: DNR   Attending Provider: Nilo Aburto MD  Consulting Provider: Axel Dumas MD  Primary Care Physician: Alberto Lundberg Jr., MD  Principal Problem:<principal problem not specified>      Assessment/Plan:     Palliative Care  Comfort measures only status  Hospice Qualifying Diagnosis: Cardiac arrest and anoxic brain injury       Interval update 11/10/2024- continued symptom management needs, progressing as expected, family updated via telephone    Symptom Assessment:  Overall, patient continues to have ongoing clinical deterioration related to the terminal diagnosis        - Pain: controlled  - Dyspnea: controlled  - Agitation: controlled  - Mentation: unresponsive     Current Medications:       Current Facility-Administered Medications:     acetaminophen suppository 650 mg, 650 mg, Rectal, Q6H PRN, Nilo Aburto MD    artificial tears 0.5 % ophthalmic solution 1 drop, 1 drop, Both Eyes, Q4H, Nilo Aburto MD, 1 drop at 11/07/24 0600    artificial tears 0.5 % ophthalmic solution 1 drop, 1 drop, Both Eyes, PRN, Nilo Aburto MD    bisacodyL suppository 10 mg, 10 mg, Rectal, Daily PRN, Nilo Aburto MD    diazePAM injection 5 mg, 5 mg, Intravenous, Q8H, Nilo Aburto MD    haloperidol lactate injection 2 mg, 2 mg, Intravenous, Q4H PRN, Nilo Aburto MD    LORazepam injection 1 mg, 1 mg, Intravenous, Q4H PRN, Nilo Aburto MD, 1 mg at 11/06/24 1113    morphine 100 mg (1 mg/mL) in NACL 100 mL infusion (TITRATING), 0-10 mg/hr, Intravenous, Continuous, Izabel Jones PA-C, Last Rate: 2 mL/hr at 11/05/24 1305, 2 mg/hr at 11/05/24 1305    morphine IV bolus from bag/infusion 2 mg, 2 mg, Intravenous, Q30 Min PRN, Nilo Aburto MD    scopolamine 1.3-1.5 mg (1 mg over 3 days) 1  patch, 1 patch, Transdermal, Q3 Days, Soraida Dumont PA-C, 1 patch at 11/06/24 1500    sodium chloride 0.9% flush 10 mL, 10 mL, Intravenous, PRN, Maya Cruz PA-C        Family discussion updates:  Comfort focused care plan     Plan of care:  Continues to qualify for [facility] level of hospice care     Follow up plans:   - Symptomatic condition is not stabilized.   - planning to admit to [facility] level of hospice due to ongoing required parenteral interventions        Advanced Directives::  Living Will: No  LaPOST: No  Do Not Resuscitate Status: Yes  Surrogate Decision maker / Medical Power of :   Maryse Marcial  Emergency Contact  [phone]        Comfort care plan:  Dyspnea / Secretions  - O2 per nasal cannula as needed for comfort  - Morphine 2mg/hr infusion and 2 mg IV q1h PRN dyspnea or breakthrough pain  - Lorazepam 1 mg IV q1h PRN dyspnea refractory to opioids, anxiety  - Glycopyrrolate 0.2 mg IV q4h PRN secretions  - Frequent oral care  - Gentle repositioning of head to shift oropharyngeal secretions out of airway  - Yankauer suction at bedside  - Fan at bedside  - Avoid artificial hydration     Pain  - Opioids as above     Seizures  - ativan 1 mg q 1 hour prn; trial dose now for possible underlying NCSE  - schedule valium 5 mg tid     Agitation / Delirium / Anxiety  - Gentle reorientation techniques, minimize environmental stimuli  - Discontinue laboratory draws and remove non-essential lines  - Treat for pain/dyspnea as above  - Lorazepam 1 mg IV q1h PRN anxiety, dyspnea refractory to opioids  - Haldol 2 mg IV q4h PRN agitation     Nutrition / Hydration  - Discontinue IV fluids and artificial nutrition  - Comfort feedings as desired and tolerated  - Simplify medication regimen by eliminating those medications that provide little to no benefit at end-of-life     Nausea  - Ondansetron 4 mg IV q6h PRN nausea/vomiting     Bowel Regimen  - Bisacodyl 10 mg suppository daily PRN constipation  -  Will not prioritize at end-of-life     Fever  - Apply cold compresses  - Acetaminophen 650 mg suppository q4h PRN fever  - Fan at bedside          Subjective:     Chief Complaint: No chief complaint on file.      HPI:   Irvin Nuñez is a 75 yo male with PMHx HTN, HLD, schizophrenia, and vascular dementia who was transferred from [facility] ED to [facility] for seizure-like activity post cardiac arrest. Per family at OSH, patient has been having syncopal episodes for the past 4 months which have become much more frequent in the past 1-2 months. Today patient had another syncopal episode and collapsed at home. CPR was initiated by family and received 6 rounds of epi while being transported via [place]. Patient was intubated and ROSC achieved en route. CT head neg for acute intracranial processes. Echo showed EF 67%. EKG NSR. Lactic acid and trop elevated. UA positive for nitrites, leukocyte esterase, WBC, and bacteria. K 3.2. ABG with metabolic acidosis with partial respiratory compensation. While at OSH ED, patient had intermittent twitching of the head and eyes concerning for possible seizure like activity. He was kept on prop 30 for suppression of any possible seizure activity. He was transferred to [facility] for further workup and higher level of care.      Hospital Course by Event: 10/31/2024: Rewarming started at 4AM, achieved target temp at 7:58AM. EEG with diffusely suppressed background. MRI consistent with global anoxic injury and obstructive hydrocephalus. Made DNR after conversation with brother and sister at bedside (see ACP note.)  11/01/2024: Overnight patient required both cooling and Jessica hugger to maintain normothermia. Fent started due to tachypnea, tachycardia, vent dyssynchrony. Planning for family meeting Sunday.  11/02/2024: [facility] ordered for overnight followed by family meeting tomorrow.   11/3: family meeting today for GOC and prognostication  11/4/2024 continue comfort care, d/c to inpatient hospice  "    Admitted to [facility] hospice 11/7 for ongoing inpt management needs for optimal symptom control    Hospital Course:  No notes on file    Interval History:  Chart reviewed including 24h medication use. Patient resting comfortably in bed, eyes open but minimally responsive; occasionally blinks, unclear if voluntary. No family at bedside in the afternoon.    Palliative ROS:  PRNs: morphine continuous infusion 2mg/hr, 2mg bolus from bag x1 for respiratory distress, valium 5mg IV x3,   Pain behaviors- controlled  Respiratory distress- controlled  Agitation- controlled  Seizure-like activity- controlled    Objective:     BP (!) 109/56   Pulse 94   Temp 97.6 °F (36.4 °C) (Oral)   Resp 20   Ht 5' 9" (1.753 m)   Wt 69.4 kg (153 lb)   SpO2 (!) 0% Comment: SG  BMI 22.59 kg/m²        Physical Exam  Vitals and nursing note reviewed.   Constitutional:       Appearance: He is ill-appearing.      Comments: Older, chronically ill-appearing male lying in bed, eyes open but minimally responsive, no observed pain behaviors   Cardiovascular:      Comments: LUE radial pulse intact, RUE radial absent  Pulmonary:      Effort: No respiratory distress.      Comments: Shallow, irregular breathing with apneic periods ~15 seconds  Musculoskeletal:      Right lower leg: No edema.      Left lower leg: No edema.   Skin:     Comments: RUE cold and mottled   Neurological:      Comments: eyes open, does not track; occasionally blinks, unclear if purposeful        Medications:  Continuousmorphine, Last Rate: 2 mg/hr (11/09/24 2023)    Scheduledartificial tears, 1 drop, Q4H  diazePAM, 5 mg, Q8H  scopolamine, 1 patch, Q3 Days    PRNacetaminophen, 650 mg, Q6H PRN  artificial tears, 1 drop, PRN  bisacodyL, 10 mg, Daily PRN  haloperidol lactate, 2 mg, Q4H PRN  lorazepam, 1 mg, Q4H PRN  morphine, 2 mg, Q30 Min PRN  sodium chloride 0.9%, 10 mL, PRN          In my care of this patient with acute on chronic severe illness with threat to life and/or " bodily function, I am providing goal-concordant care as noted above. My care includes the use of goal-concordant, proportionate, parenteral opioids and benzodiazepines for comfort-focused care.         Axel Dumas MD  Palliative Medicine  [facility]

## 2024-11-10 NOTE — SUBJECTIVE & OBJECTIVE
"Interval History:  Chart reviewed including 24h medication use. Patient resting comfortably in bed, eyes open but minimally responsive; occasionally blinks, unclear if voluntary. No family at bedside in the afternoon.    Palliative ROS:  PRNs: morphine continuous infusion 2mg/hr, 2mg bolus from bag x1 for respiratory distress, valium 5mg IV x3,   Pain behaviors- controlled  Respiratory distress- controlled  Agitation- controlled  Seizure-like activity- controlled    Objective:     BP (!) 109/56   Pulse 94   Temp 97.6 °F (36.4 °C) (Oral)   Resp 20   Ht 5' 9" (1.753 m)   Wt 69.4 kg (153 lb)   SpO2 (!) 0% Comment: SG  BMI 22.59 kg/m²        Physical Exam  Vitals and nursing note reviewed.   Constitutional:       Appearance: He is ill-appearing.      Comments: Older, chronically ill-appearing male lying in bed, eyes open but minimally responsive, no observed pain behaviors   Cardiovascular:      Comments: LUE radial pulse intact, RUE radial absent  Pulmonary:      Effort: No respiratory distress.      Comments: Shallow, irregular breathing with apneic periods ~15 seconds  Musculoskeletal:      Right lower leg: No edema.      Left lower leg: No edema.   Skin:     Comments: RUE cold and mottled   Neurological:      Comments: eyes open, does not track; occasionally blinks, unclear if purposeful        Medications:  Continuousmorphine, Last Rate: 2 mg/hr (11/09/24 2023)    Scheduledartificial tears, 1 drop, Q4H  diazePAM, 5 mg, Q8H  scopolamine, 1 patch, Q3 Days    PRNacetaminophen, 650 mg, Q6H PRN  artificial tears, 1 drop, PRN  bisacodyL, 10 mg, Daily PRN  haloperidol lactate, 2 mg, Q4H PRN  lorazepam, 1 mg, Q4H PRN  morphine, 2 mg, Q30 Min PRN  sodium chloride 0.9%, 10 mL, PRN          "

## 2024-11-10 NOTE — ASSESSMENT & PLAN NOTE
Hospice Qualifying Diagnosis: Cardiac arrest and anoxic brain injury       Interval update 11/10/2024- continued symptom management needs, progressing as expected, family updated via telephone    Symptom Assessment:  Overall, patient continues to have ongoing clinical deterioration related to the terminal diagnosis        - Pain: controlled  - Dyspnea: controlled  - Agitation: controlled  - Mentation: unresponsive     Current Medications:       Current Facility-Administered Medications:     acetaminophen suppository 650 mg, 650 mg, Rectal, Q6H PRN, Nilo Aburto MD    artificial tears 0.5 % ophthalmic solution 1 drop, 1 drop, Both Eyes, Q4H, Nilo Aburto MD, 1 drop at 11/07/24 0600    artificial tears 0.5 % ophthalmic solution 1 drop, 1 drop, Both Eyes, PRN, Nilo Aburto MD    bisacodyL suppository 10 mg, 10 mg, Rectal, Daily PRN, Nilo Aburto MD    diazePAM injection 5 mg, 5 mg, Intravenous, Q8H, Nilo Aburto MD    haloperidol lactate injection 2 mg, 2 mg, Intravenous, Q4H PRN, Nilo Aburto MD    LORazepam injection 1 mg, 1 mg, Intravenous, Q4H PRN, Nilo Aburto MD, 1 mg at 11/06/24 1113    morphine 100 mg (1 mg/mL) in NACL 100 mL infusion (TITRATING), 0-10 mg/hr, Intravenous, Continuous, Izabel Jones PA-C, Last Rate: 2 mL/hr at 11/05/24 1305, 2 mg/hr at 11/05/24 1305    morphine IV bolus from bag/infusion 2 mg, 2 mg, Intravenous, Q30 Min PRN, Nilo Aburto MD    scopolamine 1.3-1.5 mg (1 mg over 3 days) 1 patch, 1 patch, Transdermal, Q3 Days, Soraida Dumont PA-C, 1 patch at 11/06/24 1500    sodium chloride 0.9% flush 10 mL, 10 mL, Intravenous, PRN, Maya Cruz PA-C        Family discussion updates:  Comfort focused care plan     Plan of care:  Continues to qualify for [facility] level of hospice care     Follow up plans:   - Symptomatic condition is not stabilized.   - planning to admit to [facility] level of hospice due to ongoing required parenteral interventions         Advanced Directives::  Living Will: No  LaPOST: No  Do Not Resuscitate Status: Yes  Surrogate Decision maker / Medical Power of :   Maryse Campos  Sister  Emergency Contact  [phone]        Comfort care plan:  Dyspnea / Secretions  - O2 per nasal cannula as needed for comfort  - Morphine 2mg/hr infusion and 2 mg IV q1h PRN dyspnea or breakthrough pain  - Lorazepam 1 mg IV q1h PRN dyspnea refractory to opioids, anxiety  - Glycopyrrolate 0.2 mg IV q4h PRN secretions  - Frequent oral care  - Gentle repositioning of head to shift oropharyngeal secretions out of airway  - Yankauer suction at bedside  - Fan at bedside  - Avoid artificial hydration     Pain  - Opioids as above     Seizures  - ativan 1 mg q 1 hour prn; trial dose now for possible underlying NCSE  - schedule valium 5 mg tid     Agitation / Delirium / Anxiety  - Gentle reorientation techniques, minimize environmental stimuli  - Discontinue laboratory draws and remove non-essential lines  - Treat for pain/dyspnea as above  - Lorazepam 1 mg IV q1h PRN anxiety, dyspnea refractory to opioids  - Haldol 2 mg IV q4h PRN agitation     Nutrition / Hydration  - Discontinue IV fluids and artificial nutrition  - Comfort feedings as desired and tolerated  - Simplify medication regimen by eliminating those medications that provide little to no benefit at end-of-life     Nausea  - Ondansetron 4 mg IV q6h PRN nausea/vomiting     Bowel Regimen  - Bisacodyl 10 mg suppository daily PRN constipation  - Will not prioritize at end-of-life     Fever  - Apply cold compresses  - Acetaminophen 650 mg suppository q4h PRN fever  - Fan at bedside

## 2024-11-10 NOTE — PLAN OF CARE
Problem: Adult Inpatient Plan of Care  Goal: Plan of Care Review  Outcome: Progressing  Goal: Patient-Specific Goal (Individualized)  Outcome: Progressing  Goal: Absence of Hospital-Acquired Illness or Injury  Outcome: Progressing  Goal: Optimal Comfort and Wellbeing  Outcome: Progressing  Goal: Readiness for Transition of Care  Outcome: Progressing     Problem: Infection  Goal: Absence of Infection Signs and Symptoms  Outcome: Progressing     Problem: Wound  Goal: Optimal Coping  Outcome: Progressing  Goal: Optimal Functional Ability  Outcome: Progressing  Goal: Absence of Infection Signs and Symptoms  Outcome: Progressing  Goal: Improved Oral Intake  Outcome: Progressing  Goal: Optimal Pain Control and Function  Outcome: Progressing  Goal: Skin Health and Integrity  Outcome: Progressing  Goal: Optimal Wound Healing  Outcome: Progressing     Problem: Palliative Care  Goal: Enhanced Quality of Life  Outcome: Progressing     Problem: Pain Acute  Goal: Optimal Pain Control and Function  Outcome: Progressing     Problem: Fall Injury Risk  Goal: Absence of Fall and Fall-Related Injury  Outcome: Progressing     Problem: Skin Injury Risk Increased  Goal: Skin Health and Integrity  Outcome: Progressing

## 2024-11-11 NOTE — PLAN OF CARE
Problem: Adult Inpatient Plan of Care  Goal: Plan of Care Review  Outcome: Progressing  Goal: Patient-Specific Goal (Individualized)  Outcome: Progressing  Goal: Absence of Hospital-Acquired Illness or Injury  Outcome: Progressing  Goal: Optimal Comfort and Wellbeing  Outcome: Progressing  Goal: Readiness for Transition of Care  Outcome: Progressing     Problem: Infection  Goal: Absence of Infection Signs and Symptoms  Outcome: Progressing     Problem: Wound  Goal: Optimal Coping  Outcome: Progressing  Goal: Optimal Functional Ability  Outcome: Progressing  Goal: Absence of Infection Signs and Symptoms  Outcome: Progressing  Goal: Optimal Pain Control and Function  Outcome: Progressing  Goal: Skin Health and Integrity  Outcome: Progressing  Goal: Optimal Wound Healing  Outcome: Progressing     Problem: Palliative Care  Goal: Enhanced Quality of Life  Outcome: Progressing     Problem: Pain Acute  Goal: Optimal Pain Control and Function  Outcome: Progressing     Problem: Fall Injury Risk  Goal: Absence of Fall and Fall-Related Injury  Outcome: Progressing     Problem: Skin Injury Risk Increased  Goal: Skin Health and Integrity  Outcome: Progressing     Problem: End-of-Life Care  Goal: Comfort, Peace and Preserved Dignity  Outcome: Progressing

## 2024-11-11 NOTE — PROGRESS NOTES
Palliative Medicine  Consult Note     Patient Name: Irvin Nuñez Jr.   MRN: [id number]     Admission Date: 11/7/2024   Hospital Length of Stay: 4     Attending Provider: Dontrell White MD   Consulting Provider: Dontrell White MD  Primary Care Physician: Alberto Lundberg Jr., MD     Assessment/Plan:   Irvin Nuñez is a 75 yo male with PMHx HTN, HLD, schizophrenia, and vascular dementia; see below for HPI.    Patient continued to decline despite medical interventions. Decision made to transition to comfort-focused care and admitted to the PCU on 11/04/24. On 11/07/24 patient admitted under [facility] due to escalating comfort needs.      Primary Palliative Care / Hospice Diagnosis: Cardiac arrest and anoxic brain injury    #GOC: comfort-focused approach to care; see prior ACP notes for further details    Impression:  Patient appears comfortable on current regimen. No changes recommended. Continuing progress towards EoL as expected.     Justification of Inpatient Needs:   Significant symptom burden due to significant anoxic brain damange  Active dying process as evidence by decreased or absent PO intake and decreased urine output  Inability to tolerate PO medications  Requiring IV opioids and benzodiazepines for pain behaviours, respiratory distress, and/or anxiety    Likely prognosis: Days to short weeks    Dispo:  Anticipate patient will end up receiving EoL care on PCU  Requiring medications to control symptoms that cannot be administered in home settting  If patient unexpectedly stabilizes, will revisit conversation with family regarding care at home vs alternative facility options to receive hospice     Symptom Assessment & Management:  #Pain: active issue and controlled  Continue Morphine 2mg/hr IV infusion  Continue Morphine 2mg IV q30min PRN  Patient required zero PRN doses over past 24hrs         24hr OME :  11/10/24: 216     #SOB / Dyspnea: non-active issue  See above for opioid recommendations  Continue  non-pharmacologic interventions including bedside fan and keeping room at cool temperature    #Anxiety / Agitation:   Continue Ativan 1mg IV q4H PRN  Patient required zero PRN doses over past 24hrs  Continue Haldol 2mg IV q4H PRN  Patient required zero PRN doses over past 24hrs    #Secretions: active issue and controlled  Continue Scopolamine patch TD q72H scheduled    #Constipation:   Patient needs to be on regularly scheduled bowel regimen to reduce risk of opioid-induced constipation even if no longer eating  Continue Bisacodyl 10mg [place] qday PRN if not BM in 24-48 hrs    Subjective:   Brief HPI:  Irvin Nuñez is a 75 yo male with PMHx HTN, HLD, schizophrenia, and vascular dementia who was transferred from OSH ED to [facility] for seizure-like activity post cardiac arrest. Per family at OSH, patient has been having syncopal episodes for the past 4 months which have become much more frequent in the past 1-2 months. Today patient had another syncopal episode and collapsed at home. CPR was initiated by family and received 6 rounds of epi while being transported via [place]. Patient was intubated and ROSC achieved en route. CT head neg for acute intracranial processes. Echo showed EF 67%. EKG NSR. Lactic acid and trop elevated. UA positive for nitrites, leukocyte esterase, WBC, and bacteria. K 3.2. ABG with metabolic acidosis with partial respiratory compensation. While at OSH ED, patient had intermittent twitching of the head and eyes concerning for possible seizure like activity. He was kept on prop 30 for suppression of any possible seizure activity. He was transferred to [facility] for further workup and higher level of care. Admitted to PCU on 11/04/24.     Interval History:  NAEO. Chart review completed including medications administered over past 24hrs. Patient is unresponsive and appears comfortable. Blinking upon request, but unclear if that is him following commands on not. Was able to blink twice on command once, but  unable to repeat when asked.     Palliative ROS:   Pain: No objective signs of discomfort or distress  Respiratory distress: No objective signs of discomfort or distress  Secretions: None  Nausea: Unable to assess  Bowel movements: last BM 10/28  Agitation: None  Hallucinations: None    Biopsychosocial History:  Family:   Maryse Campos (sister) [phone]    Objective:   Vitals:   Temp: 98.1 °F (36.7 °C) (11/11/24 0722)  Pulse: 91 (11/11/24 0722)  Resp: 16 (11/11/24 0722)  BP: (!) 126/58 (11/11/24 0722)  SpO2: (!) 79 % (11/11/24 0722)    Physical Exam:  Constitutional:       General: NAD. They appear comfortable.     Appearance: Patient is ill- and toxic-appearing. They are not diaphoretic. Eyes wide open and fixed. Unresponsive.  HENT:      Head: Normocephalic and atraumatic.   Cardiovascular:      LUE radial pulse strong; RUE pulse barely felt  Pulmonary:      Effort: No respiratory distress. No increased work of breathing. No apneic episodes today.  Abdominal:      General: Bowel sounds are normal. There is no distension.      Palpations: Abdomen is soft and non-tender.   Skin:     General: Skin is warm and dry.      Coloration: Skin is not jaundiced.      Findings: Bruising present on extremities.   Neurological:      Mental Status: Patient is lethargic and unresponsive. Eyes wide open and fixed, blinking. Blinks twice when requested on one occasion, but could not repeat request.      Medications:  Continuous Infusions:    morphine  0-10 mg/hr Intravenous Continuous 2 mL/hr at 11/09/24 2023 2 mg/hr at 11/09/24 2023       Scheduled Meds:    artificial tears  1 drop Both Eyes Q4H    diazePAM  5 mg Intravenous Q8H    scopolamine  1 patch Transdermal Q3 Days       PRN Meds:  Current Facility-Administered Medications:     acetaminophen, 650 mg, Rectal, Q6H PRN    artificial tears, 1 drop, Both Eyes, PRN    bisacodyL, 10 mg, Rectal, Daily PRN    haloperidol lactate, 2 mg, Intravenous, Q4H PRN    lorazepam, 1 mg,  Intravenous, Q4H PRN    morphine, 2 mg, Intravenous, Q30 Min PRN    sodium chloride 0.9%, 10 mL, Intravenous, PRN     Labs:   Creatinine   Date Value Ref Range Status   11/03/2024 0.6 0.5 - 1.4 mg/dL Final      Hemoglobin   Date Value Ref Range Status   11/03/2024 8.4 (L) 14.0 - 18.0 g/dL Final      Albumin   Date Value Ref Range Status   11/03/2024 2.2 (L) 3.5 - 5.2 g/dL Final   11/02/2024 2.2 (L) 3.5 - 5.2 g/dL Final   11/01/2024 2.6 (L) 3.5 - 5.2 g/dL Final        ===================================================================  Billing:  In my care of this patient with acute on chronic severe illness with threat to life and/or bodily function, I am providing goal-concordant care as noted above. My care includes the use of goal-concordant, proportionate, parenteral opioids and benzodiazepines for comfort-focused care.     Signed,  Dontrell White MD  Hospice & Palliative Medicine

## 2024-11-12 NOTE — NURSING
PIV to LUE discontinued per protocol due to leaking when flushed. Site care to Suprapubic cath site with normal saline, split gauze applied, secured with tape. Sore to penis open to air, cleaned with soap and water, cream applied. Sacral wound cleaned with normal saline, cream and Mepilex applied. Dressing change to LUE Midline per protocol. Comfort care maintained.

## 2024-11-12 NOTE — NURSING
Contact Isolation maintained. Pt assessed per flowsheet. Pt in bed with eyes open, non responsive. Pt on room air. Morphine continuous drip infusing to LUE Midline. Mepliex noted to bilateral heels and coccyx area. Suprapubic cath to patino bag with yellow urine with red sediment noted. Comfort care maintained.

## 2024-11-12 NOTE — PLAN OF CARE
Problem: Palliative Care  Goal: Enhanced Quality of Life  Outcome: Progressing  Intervention: Maximize Comfort  Flowsheets (Taken 11/12/2024 0513)  Pain Management Interventions:   pillow support provided   position adjusted   pain pump in use  Intervention: Optimize Function  Flowsheets (Taken 11/12/2024 0513)  Sensory Stimulation Regulation:   quiet environment promoted   lighting decreased     Problem: Pain Acute  Goal: Optimal Pain Control and Function  Outcome: Progressing  Intervention: Develop Pain Management Plan  Flowsheets (Taken 11/12/2024 0513)  Pain Management Interventions:   pillow support provided   position adjusted   pain pump in use  Intervention: Prevent or Manage Pain  Flowsheets (Taken 11/12/2024 0513)  Sensory Stimulation Regulation:   quiet environment promoted   lighting decreased     Problem: End-of-Life Care  Goal: Comfort, Peace and Preserved Dignity  Outcome: Progressing  Intervention: Promote Physical Comfort  Flowsheets (Taken 11/12/2024 0513)  Sensory Stimulation Regulation:   quiet environment promoted   lighting decreased

## 2024-11-12 NOTE — PROGRESS NOTES
Palliative Medicine  Consult Note     Patient Name: Irvin Nuñez Jr.   MRN: [id number]     Admission Date: 11/7/2024   Hospital Length of Stay: 5     Attending Provider: Dontrell White MD   Consulting Provider: Dontrell White MD  Primary Care Physician: Alberto Lundberg Jr., MD     Assessment/Plan:   Irvin Nuñez is a 75 yo male with PMHx HTN, HLD, schizophrenia, and vascular dementia; see below for HPI.     Patient continued to decline despite medical interventions. Decision made to transition to comfort-focused care and admitted to the PCU on 11/04/24. On 11/07/24 patient admitted under [facility] due to escalating comfort needs.      Primary Palliative Care / Hospice Diagnosis: Cardiac arrest and anoxic brain injury     #GOC: comfort-focused approach to care; see prior ACP notes for further details    Impression:  Patient remains comfortable on current regimen with no need for PRNs in past 24hrs. Progressing as expected.     Justification of Inpatient Needs:   Significant symptom burden due to advanced anoxic brain damage  Active dying process as evidence by loss of consciousness, irregular breathing pattern, decreased or absent PO intake, weak or absent pulse, and cool extremities  Inability to tolerate PO medications  Requiring IV opioids and benzodiazepines for pain behaviours, respiratory distress, and/or anxiety    Likely prognosis: Hours to short days    Dispo:  Anticipate patient will end up receiving EoL care on PCU  Requiring medications to control symptoms that cannot be administered in home settting      Symptom Assessment & Management:  #Pain: active issue and controlled  Continue Morphine 2mg/hr IV infusion  Continue Morphine 2mg IV q30min PRN  Patient required zero PRN doses over past 24hrs          24hr OME :  11/11/24: 216   11/10/24: 216      #SOB / Dyspnea: non-active issue  See above for opioid recommendations  Continue non-pharmacologic interventions including bedside fan and keeping room  at cool temperature     #Anxiety / Agitation:   Continue Ativan 1mg IV q4H PRN  Patient required zero PRN doses over past 24hrs  Continue Haldol 2mg IV q4H PRN  Patient required zero PRN doses over past 24hrs     #Secretions: active issue and controlled  Continue Scopolamine patch TD q72H scheduled     #Constipation:   Patient needs to be on regularly scheduled bowel regimen to reduce risk of opioid-induced constipation even if no longer eating  Continue Bisacodyl 10mg [place] qday PRN if not BM in 24-48 hrs    Subjective:   Brief HPI:  Irvin Nuñez is a 73 yo male with PMHx HTN, HLD, schizophrenia, and vascular dementia who was transferred [place] [facility]SH ED [place] [facility] for seizure-like activity post cardiac arrest. Per family at OSH, patient has been having syncopal episodes for the past 4 months which have become much more frequent in the past 1-2 months. Today patient had another syncopal episode and collapsed at home. CPR was initiated by family and received 6 rounds of epi while being transported via [facility]. Patient was intubated and ROSC achieved en route. CT head neg for acute intracranial processes. Echo showed EF 67%. EKG NSR. Lactic acid and trop elevated. UA positive for nitrites, leukocyte esterase, WBC, and bacteria. K 3.2. ABG with metabolic acidosis with partial respiratory compensation. While at OSH ED, patient had intermittent twitching of the head and eyes concerning for possible seizure like activity. He was kept on prop 30 for suppression of any possible seizure activity. He was transferred [place] [facility] [place] further workup and higher level of care. Admitted to PCU on 11/04/24.     Interval History:  NAEO. Chart review completed including medications administered over past 24hrs. Patient appears comfortable. Remains unresponsive, today not following command to blink.     Palliative ROS:   Pain: No objective signs of discomfort or distress  Respiratory distress: No objective signs of discomfort or distress  Secretions:  None  Nausea: Unable to assess  Bowel movements: last BM 10/28  Agitation: None  Hallucinations: None    Biopsychosocial History:  Family:   Maryse Campos (sister) [phone]    Objective:   Vitals:   Temp: 97.6 °F (36.4 °C) (11/12/24 0712)  Pulse: 94 (11/12/24 0712)  Resp: 15 (11/12/24 0712)  BP: (!) 106/57 (11/12/24 0712)  SpO2:  (Wasnt Able) (11/12/24 0712)    Physical Exam:  Constitutional:       General: NAD. They appear comfortable.     Appearance: Patient is ill- and toxic-appearing. They are not diaphoretic. Unresponsive.  HENT:      Head: Normocephalic and atraumatic.   Cardiovascular:      BUE radial pulses weak  Pulmonary:      Effort: No respiratory distress. No increased work of breathing.     RR: 12  Skin:     General: Cool extremities     Coloration: Skin is not jaundiced.      Findings: Bruising present on extremities.   Neurological:      Mental Status: Patient is lethargic and unresponsive. Eyes wide open and fixed. Not following commands.     Medications:  Continuous Infusions:    morphine  0-10 mg/hr Intravenous Continuous 2 mL/hr at 11/11/24 2146 2 mg/hr at 11/11/24 2146       Scheduled Meds:    artificial tears  1 drop Both Eyes Q4H    diazePAM  5 mg Intravenous Q8H    scopolamine  1 patch Transdermal Q3 Days       PRN Meds:  Current Facility-Administered Medications:     acetaminophen, 650 mg, Rectal, Q6H PRN    artificial tears, 1 drop, Both Eyes, PRN    bisacodyL, 10 mg, Rectal, Daily PRN    haloperidol lactate, 2 mg, Intravenous, Q4H PRN    lorazepam, 1 mg, Intravenous, Q4H PRN    morphine, 2 mg, Intravenous, Q30 Min PRN    sodium chloride 0.9%, 10 mL, Intravenous, PRN     Labs:   Creatinine   Date Value Ref Range Status   11/03/2024 0.6 0.5 - 1.4 mg/dL Final      Hemoglobin   Date Value Ref Range Status   11/03/2024 8.4 (L) 14.0 - 18.0 g/dL Final      Albumin   Date Value Ref Range Status   11/03/2024 2.2 (L) 3.5 - 5.2 g/dL Final   11/02/2024 2.2 (L) 3.5 - 5.2 g/dL Final   11/01/2024  2.6 (L) 3.5 - 5.2 g/dL Final      ===================================================================  Billing:  In my care of this patient with acute on chronic severe illness with threat to life and/or bodily function, I am providing goal-concordant care as noted above. My care includes the use of goal-concordant, proportionate, parenteral opioids and benzodiazepines for comfort-focused care.     Signed,  Dontrell White MD  Hospice & Palliative Medicine

## 2024-11-13 NOTE — PROGRESS NOTES
Palliative Medicine  Consult Note     Patient Name: Irvin Nuñez Jr.   MRN: [id number]     Admission Date: 11/7/2024   Hospital Length of Stay: 6     Attending Provider: Dontrell White MD   Consulting Provider: Dontrell White MD  Primary Care Physician: Alberto Lundberg Jr., MD     Assessment/Plan:   Irvin Nuñez is a 73 yo male with PMHx HTN, HLD, schizophrenia, and vascular dementia; see below for HPI.     Patient continued to decline despite medical interventions. Decision made to transition to comfort-focused care and admitted to the PCU on 11/04/24. On 11/07/24 patient admitted under [facility] due to escalating comfort needs.      Primary Palliative Care / Hospice Diagnosis: Cardiac arrest and anoxic brain injury     #GOC: comfort-focused approach to care; see prior ACP notes for further details    Impression:  Patient remains comfortable on current regimen. Overnight Morphine titrated up to 3mg/hr by staff; order was entered as titrating dose based on RASS. Have since changed infusion order to non-titrating. Continues to progress towards EoL as expected. Breathing more irregular with prolonged apneic episodes. Now also amenable to having his eyes shut for him, which he resisted before.       Justification of Inpatient Needs:   Significant symptom burden due to advanced anoxic brain damage  Active dying process as evidence by loss of consciousness, irregular breathing pattern, decreased or absent PO intake, weak or absent pulse, and cool extremities  Inability to tolerate PO medications  Requiring IV opioids and benzodiazepines for pain behaviours, respiratory distress, and/or anxiety    Likely prognosis: Hours to short days    Dispo:  Anticipate patient will end up receiving EoL care on PCU  Requiring medications to control symptoms that cannot be administered in home settting    Symptom Assessment & Management:  #Pain: active issue and controlled  CHANGE Morphine 3mg/hr IV infusion  Continue Morphine 2mg  IV q30min PRN  Patient required zero PRN doses over past 24hrs          24hr OME :  11/12/24: 216   11/11/24: 216   11/10/24: 216      #SOB / Dyspnea: non-active issue  See above for opioid recommendations  Continue non-pharmacologic interventions including bedside fan and keeping room at cool temperature     #Anxiety / Agitation:   Continue Ativan 1mg IV q4H PRN  Patient required zero PRN doses over past 24hrs  Continue Haldol 2mg IV q4H PRN  Patient required zero PRN doses over past 24hrs     #Secretions: active issue and controlled  Continue Scopolamine patch TD q72H scheduled     #Constipation:   Patient needs to be on regularly scheduled bowel regimen to reduce risk of opioid-induced constipation even if no longer eating  Continue Bisacodyl 10mg [place] qday PRN if not BM in 24-48 hrs    Subjective:   Brief HPI:  Irvin Nuñez is a 75 yo male with PMHx HTN, HLD, schizophrenia, and vascular dementia who was transferred from OSH ED to [facility] for seizure-like activity post cardiac arrest. Per family at OSH, patient has been having syncopal episodes for the past 4 months which have become much more frequent in the past 1-2 months. Today patient had another syncopal episode and collapsed at home. CPR was initiated by family and received 6 rounds of epi while being transported via [place]. Patient was intubated and ROSC achieved en route. CT head neg for acute intracranial processes. Echo showed EF 67%. EKG NSR. Lactic acid and trop elevated. UA positive for nitrites, leukocyte esterase, WBC, and bacteria. K 3.2. ABG with metabolic acidosis with partial respiratory compensation. While at OSH ED, patient had intermittent twitching of the head and eyes concerning for possible seizure like activity. He was kept on prop 30 for suppression of any possible seizure activity. He was transferred to [facility] for further workup and higher level of care. Admitted to PCU on 11/04/24.     Interval History:  NAEO. Chart review completed  including medications administered over past 24hrs. Patient appears comfortable. Remains unresponsive, today not following commands. Amenable to having his eyes closed for him today which he was resisting previously.     Palliative ROS:   Pain: No objective signs of discomfort or distress  Respiratory distress: No objective signs of discomfort or distress  Secretions: None  Nausea: Unable to assess  Bowel movements: last BM 10/28  Agitation: None  Hallucinations: None    Biopsychosocial History:  Family:   Maryse Campos (sister) [phone]    Objective:   Vitals:   Temp: 98.6 °F (37 °C) (11/13/24 0718)  Pulse: 92 (11/13/24 0718)  Resp: (!) 22 (11/13/24 0718)  BP: 98/70 (11/13/24 0718)  SpO2: (!) 81 % (11/13/24 0718)    Physical Exam:  Constitutional:       General: NAD. They appear comfortable.     Appearance: Patient is ill- and toxic-appearing. They are not diaphoretic. Unresponsive.  HENT:      Head: Normocephalic and atraumatic.   Cardiovascular:      BUE radial pulses weak  Pulmonary:      Effort: No respiratory distress. No increased work of breathing. Apneic episodes noted longer in duration than on prior exam.      RR: 12  Skin:     General: Cool extremities     Coloration: Skin is not jaundiced.      Findings: Bruising present on extremities.   Neurological:      Mental Status: Patient is lethargic and unresponsive. Eyes wide open and fixed. Not following commands.     Medications:  Continuous Infusions:    morphine  0-10 mg/hr Intravenous Continuous 3 mL/hr at 11/13/24 0540 3 mg/hr at 11/13/24 0540       Scheduled Meds:    artificial tears  1 drop Both Eyes Q4H    diazePAM  5 mg Intravenous Q8H    scopolamine  1 patch Transdermal Q3 Days       PRN Meds:  Current Facility-Administered Medications:     acetaminophen, 650 mg, Rectal, Q6H PRN    artificial tears, 1 drop, Both Eyes, PRN    bisacodyL, 10 mg, Rectal, Daily PRN    haloperidol lactate, 2 mg, Intravenous, Q4H PRN    lorazepam, 1 mg, Intravenous,  Q4H PRN    morphine, 2 mg, Intravenous, Q30 Min PRN    sodium chloride 0.9%, 10 mL, Intravenous, PRN     Labs:   Creatinine   Date Value Ref Range Status   11/03/2024 0.6 0.5 - 1.4 mg/dL Final      Hemoglobin   Date Value Ref Range Status   11/03/2024 8.4 (L) 14.0 - 18.0 g/dL Final      Albumin   Date Value Ref Range Status   11/03/2024 2.2 (L) 3.5 - 5.2 g/dL Final   11/02/2024 2.2 (L) 3.5 - 5.2 g/dL Final   11/01/2024 2.6 (L) 3.5 - 5.2 g/dL Final      ===================================================================  Billing:  In my care of this patient with acute on chronic severe illness with threat to life and/or bodily function, I am providing goal-concordant care as noted above. My care includes the use of goal-concordant, proportionate, parenteral opioids and benzodiazepines for comfort-focused care.     Signed,  Dontrell White MD  Hospice & Palliative Medicine

## 2024-11-13 NOTE — PHYSICIAN QUERY
Please document your best medical opinion regarding the etiology of Anoxic brain injury   Cardiac arrest

## 2024-11-13 NOTE — PLAN OF CARE
19:30 Bedside Handoff report received. Patient is resting, both eyes are closed. Respiration of 18 bpm. No apparent distress or discomfort noted. No pain behavior noted. On Cont Morphine drip infusing at 3mg/hr. On [place], intact and clean. Head of the bed elevated. Pressure points protected with pillows. Ongoing plan of care and monitoring for any changes.    12:15 Reassessed patient condition. Patient is resting both eyes are closed. No distress or discomfort. Ongoing plan of care and monitoring for any changes.    4:39 No distress or discomfort noted. Ongoing plan of care.    5:40 New Bag of Morphine hooked infusing at 3mg/hr. Underpad/Bed linens changed. Suprapubic dressing changed and dated. Turned to the right and utilized pillows for comfort. Ongoing plan of care.    Problem: Adult Inpatient Plan of Care  Goal: Plan of Care Review  Outcome: Progressing  Goal: Patient-Specific Goal (Individualized)  Outcome: Progressing  Goal: Absence of Hospital-Acquired Illness or Injury  Outcome: Progressing  Goal: Optimal Comfort and Wellbeing  Outcome: Progressing  Goal: Readiness for Transition of Care  Outcome: Progressing     Problem: Palliative Care  Goal: Enhanced Quality of Life  Outcome: Progressing     Problem: Pain Acute  Goal: Optimal Pain Control and Function  Outcome: Progressing     Problem: End-of-Life Care  Goal: Comfort, Peace and Preserved Dignity  Outcome: Progressing     Problem: Skin Injury Risk Increased  Goal: Skin Health and Integrity  Outcome: Progressing

## 2024-11-13 NOTE — CHAPLAIN
Palliative Care        Patient: Irvin Nuñez Jr.  MRN: [id number]  : 1950  Age: 74 y.o.  Hospital Length of Stay: 6  Code Status: Code Status Discussion Note  Attending Provider: Dontrell White MD  Principal Problem: <principal problem not specified>     What prompted this initial visit?:   Visited pall med pt who is actively dying.     Who was present during my visit:   Pt was alone at the time of visit; no evidence of family.     Non-clinical observations of patient/family or room:  Pt was sleeping at time of visit; eyes half open, softly snoring; pt did not seem to be in distress.     Feelings (Emotions/Fears/Experiences/Coping):   Unable to assess.  Provided compassionate presence and tender touch.    Marleny (Beliefs/Meaning/Philosophy/Distress):   EPIC says pt is [employer]; said silent blessing over pt.     Future (Spiritual Care Plan of Action):   Palliative  will continue to follow. Lord, in your mercy.        To [facility] Staff:   Educated the patient/family regarding the services offered by the Spiritual Care team in my absence as the specialized Palliative Care  ([id number])  My hours are 7:30a-4:00p M-, but Spiritual Care Chaplains are available .  I'm usually the first point of contact for palliative care patients, but any  is able to help.  Evening, overnight and weekends, please dial [phone] which is carried by an in-house Spiritual Care  at all hours.         Rev Eugene. Rebeka Lizama MDiv, [facility]  Board Certified Palliative Care   Palliative Medicine Department, 9th Floor Clinic Tower [facility] [place]     My SpectraLink: [email or web address]   Office: [phone]  ** If you call and I don't answer, please leave a voicemail because vmail is forwarded to me  Email: [email or web address]  Work hours: M-F [phone]     The care I provide is shaped by the Code of Ethics of the [employer] Professional Cha[employer]ains

## 2024-11-14 NOTE — PROGRESS NOTES
Palliative Medicine  Consult Note     Patient Name: Irvin Nuñez Jr.   MRN: [id number]     Admission Date: 11/7/2024   Hospital Length of Stay: 7     Attending Provider: Dontrell White MD   Consulting Provider: Dontrell White MD  Primary Care Physician: Alberto Lundberg Jr., MD     Assessment/Plan:   Irvin Nuñez is a 73 yo male with PMHx HTN, HLD, schizophrenia, and vascular dementia; see below for HPI.     Patient continued to decline despite medical interventions. Decision made to transition to comfort-focused care and admitted to the PCU on 11/04/24. On 11/07/24 patient admitted under [facility] due to escalating comfort needs.      Primary Palliative Care / Hospice Diagnosis: Cardiac arrest and anoxic brain injury     #GOC: comfort-focused approach to care; see prior ACP notes for further details    Impression:  Patient progressing as expected. Since 1810 yesterday, patient requiring frequent PRNs to keep him comfortable which is new (8 total between 63029 and 0910); will increase his infusion rate accordingly. Family updated about progress yesterday. When re-evaluated a few hours after changing infusion rate, patient appeared much more comfortable with eyes closed.     Justification of Inpatient Needs:   Significant symptom burden due to advanced anoxic brain damage  Active dying process as evidence by loss of consciousness, irregular breathing pattern, decreased or absent PO intake, weak or absent pulse, and cool extremities  Inability to tolerate PO medications  Requiring IV opioids and benzodiazepines for pain behaviours, respiratory distress, and/or anxiety    Likely prognosis: Hours to short days    Dispo:  Anticipate patient will end up receiving EoL care on PCU  Requiring medications to control symptoms that cannot be administered in home settting    Symptom Assessment & Management:  #Pain: active issue and controlled  CHANGE Morphine 5mg/hr IV infusion  Continue Morphine 2mg IV q30min PRN  Patient  required x3 PRN doses over past 24hrs          24hr OME :  11/13/24: 234  11/12/24: 216   11/11/24: 216  11/10/24: 216     #SOB / Dyspnea: non-active issue  See above for opioid recommendations  Continue non-pharmacologic interventions including bedside fan and keeping room at cool temperature     #Anxiety / Agitation:   Continue Ativan 1mg IV q4H PRN  Patient required zero PRN doses over past 24hrs  Continue Haldol 2mg IV q4H PRN  Patient required zero PRN doses over past 24hrs     #Secretions: active issue and controlled  Continue Scopolamine patch TD q72H scheduled     #Constipation:   Patient needs to be on regularly scheduled bowel regimen to reduce risk of opioid-induced constipation even if no longer eating  Continue Bisacodyl 10mg IN qday PRN if not BM in 24-48 hrs    Subjective:   Brief HPI:  Irvin Nuñez is a 75 yo male with PMHx HTN, HLD, schizophrenia, and vascular dementia who was [place] [facility] [place] [facility] [place]like activity post cardiac arrest. Per family at OSH, patient has been having syncopal episodes for the past 4 months which have become much more frequent in the past 1-2 months. Today patient had another syncopal episode and collapsed at home. CPR was initiated by family and received 6 rounds of epi while being transported via [facility]. Patient was intubated and ROSC achieved en route. CT head neg for acute intracranial processes. Echo showed EF 67%. EKG NSR. Lactic acid and trop elevated. UA positive for nitrites, leukocyte esterase, WBC, and bacteria. K 3.2. ABG with metabolic acidosis with partial respiratory compensation. While at OSH ED, patient had intermittent twitching of the head and eyes concerning for possible seizure like activity. He was kept on prop 30 for suppression of any possible seizure activity. He was transferred [place] [facility] [place] workup and higher level of care. Admitted to PCU on 11/04/24.     Interval History:  NAEO. Chart review completed including  medications administered over past 24hrs. Patient appears slightly uncomfortable initially, but this improved significantly a few hours after increase in his Morphine infusion. Tolerated his eyes being closed for him without resistance (which is a change from before) and kept them closed.     Palliative ROS:   Pain: No objective signs of discomfort or distress  Respiratory distress: No objective signs of discomfort or distress  Secretions: None  Nausea: Unable to assess  Bowel movements: last BM 10/28  Agitation: None  Hallucinations: None    Biopsychosocial History:  Family:   Maryse Campos (sister) [phone]    Objective:   Vitals:   Temp: 97.8 °F (36.6 °C) (11/14/24 0800)  Pulse: 93 (11/14/24 0800)  Resp: 16 (11/14/24 0910)  BP: 131/75 (11/14/24 0800)  SpO2: (!) 0 % (UTO) (11/14/24 0720)    Physical Exam:  Constitutional:       General: NAD. They appear comfortable. Eyes closed.      Appearance: Patient is ill- and toxic-appearing. They are not diaphoretic. Unresponsive.  HENT:      Head: Normocephalic and atraumatic.   Cardiovascular:      BUE radial pulses weak  Pulmonary:      Effort: No respiratory distress. No increased work of breathing. Apneic episodes noted.     RR: 10  Skin:     General: Cool extremities     Coloration: Skin is not jaundiced.      Findings: Bruising present on extremities.   Neurological:      Mental Status: Patient is lethargic and unresponsive.     Medications:  Continuous Infusions:    morphine  3 mg/hr Intravenous Continuous 3 mL/hr at 11/14/24 0909 3 mg/hr at 11/14/24 0909       Scheduled Meds:    artificial tears  1 drop Both Eyes Q4H    diazePAM  5 mg Intravenous Q8H    scopolamine  1 patch Transdermal Q3 Days       PRN Meds:  Current Facility-Administered Medications:     acetaminophen, 650 mg, Rectal, Q6H PRN    artificial tears, 1 drop, Both Eyes, PRN    bisacodyL, 10 mg, Rectal, Daily PRN    haloperidol lactate, 2 mg, Intravenous, Q4H PRN    lorazepam, 1 mg, Intravenous,  Q4H PRN    morphine, 2 mg, Intravenous, Q30 Min PRN    sodium chloride 0.9%, 10 mL, Intravenous, PRN     Labs:   Creatinine   Date Value Ref Range Status   11/03/2024 0.6 0.5 - 1.4 mg/dL Final      Hemoglobin   Date Value Ref Range Status   11/03/2024 8.4 (L) 14.0 - 18.0 g/dL Final      Albumin   Date Value Ref Range Status   11/03/2024 2.2 (L) 3.5 - 5.2 g/dL Final   11/02/2024 2.2 (L) 3.5 - 5.2 g/dL Final   11/01/2024 2.6 (L) 3.5 - 5.2 g/dL Final      ===================================================================  Billing:  In my care of this patient with acute on chronic severe illness with threat to life and/or bodily function, I am providing goal-concordant care as noted above. My care includes the use of goal-concordant, proportionate, parenteral opioids and benzodiazepines for comfort-focused care.     Signed,  Dontrell White MD  Hospice & Palliative Medicine

## 2024-11-14 NOTE — NURSING
Pt assessed per flowsheet. Pt is nonverbal. Pt asked to blink eyes once if he can hear me, pt blinked eyes. Pt asked to blink eyes once if he was in pain, pt did not blink eyes. Supra pubic cath in place per protocol. Morphine continuous drip infusing to LUE Midline at 3 mg/ hr. Mepilex to bilateral heels and sacral area. Comfort care maintained.

## 2024-11-14 NOTE — PLAN OF CARE
Problem: Palliative Care  Goal: Enhanced Quality of Life  Outcome: Progressing  Intervention: Maximize Comfort  Flowsheets (Taken 11/14/2024 0504)  Pain Management Interventions:   pillow support provided   position adjusted  Intervention: Optimize Function  Flowsheets (Taken 11/14/2024 0504)  Sleep/Rest Enhancement: awakenings minimized  Sensory Stimulation Regulation: quiet environment promoted     Problem: Pain Acute  Goal: Optimal Pain Control and Function  Outcome: Progressing  Intervention: Develop Pain Management Plan  Flowsheets (Taken 11/14/2024 0504)  Pain Management Interventions:   pillow support provided   position adjusted     Problem: End-of-Life Care  Goal: Comfort, Peace and Preserved Dignity  Outcome: Progressing  Intervention: Promote Physical Comfort  Flowsheets (Taken 11/14/2024 0504)  Sensory Stimulation Regulation: quiet environment promoted  Airway/Ventilation Support: comfort measures provided  Environmental Support: calm environment promoted

## 2024-11-14 NOTE — CHAPLAIN
"Palliative Care        Patient: Irvin Nuñez Jr.  MRN: [id number]  : 1950  Age: 74 y.o.  Hospital Length of Stay: 7  Code Status: Code Status Discussion Note  Attending Provider: Dontrell White MD  Principal Problem: <principal problem not specified>     What prompted this initial visit?:   Visited [facility] pt who is now with hospice services and actively dying; nurse called stating sister requested prayer; 30 min visit     Who was present during my visit:   Pt's sister and another family member, I think she said a niece.     Non-clinical observations of patient/family or room:  Pt laying flat, sleeping, eyes partially sleeping, covers pulled up, light snoring, appeared to be sleeping and did not appear to be in distress.     Feelings/Family/Marleny (Emotions/Fears/Experiences/Coping):   Unable to assess of pt; non-responsive    Provided compassionate presence and listening ear as sisterMaryse, spoke most of the 30 minutes. Sister shared how close they are as siblings, that they were homeless together for 2.5 yrs and how she cared for him first, even over herself. Remained silent and present while she spoke directly to the pt about how she loves him and that it's ok to transition to the Lord. She stated many times to pt and to me that "she is going to be okay". Sister asked me to pray over/for/with pt and did so and she participated in the prayer  as well.  Sister shared play by play what had happened that brought pt to the hospital initially and then tx to [facility]. Throughout her narrative she often used marleny-based language and God-speak in the affirmative, while continuing to state she will be okay through all of this. Sister also shared pictures with me, of her brother. She continued to say that she fed him, bathed him, "kept him clean", helped him up-- and fulfilled the wishes of her late mother in caring for her brother. I affirmed her good work and love for her brother and how much he appreciates " "it. Sister shared how kind and hard-working he was in his life.    In my assessment, no doubt sister has a lot of love for the pt, but her incessant talking, sharing and repeating of same points is a manifestation of her extreme anticipatory grief. Talking non-stop is often a coping mechanism, it offers self-assurance, self-soothing and also avoids just "sitting" in one's grief; still. These are normal responses for some people. Does not seem sister is in spiritual distress as much as deep anticipatory grief.  Continued listening ear and presence will be needed especially until she lets down the mental barrier (through talking almost without a breath) that's keeping her "safe".      Future (Spiritual Care Plan of Action):   Palliative care  will continue to follow and provide support for pt, but mostly sister-- she welcomes any/all chaplains. Lord, in your mercy.        To [facility] Staff:   Educated the patient/family regarding the services offered by the Spiritual Care team in my absence as the specialized Palliative Care  ([id number])  My hours are 7:30a-4:00p M-F, but Spiritual Care Chaplains are available 24/7.  I'm usually the first point of contact for palliative care patients, but any  is able to help.  Evening, overnight and weekends, please dial [phone] which is carried by an in-house Spiritual Care  at all hours.         Rev Eugene. Rebeka Lizama MDiv, [facility]  Board Certified Palliative Care   Palliative Medicine Department, 9th Floor Clinic Tower [facility] [place]     My SpectraLink: [email or web address]   Office: [phone]  ** If you call and I don't answer, please leave a voicemail because vmail is forwarded to me  Email: [email or web address]  Work hours: M-F [phone]     The care I provide is shaped by the Code of Ethics of the [employer] Professional Chaplains  "

## 2024-11-15 NOTE — DISCHARGE SUMMARY
[facility] - Hospice and Palliative Medicine  Palliative Medicine  Discharge Summary      Patient Name: Irvin Nuñez Jr.  MRN: [id number]  Admission Date: 11/7/2024  Hospital Length of Stay: 8 days  Discharge Date and Time:    Attending Physician: No att. providers found   Discharging Provider: Dontrell White MD  Primary Care Provider: Alberto Lundberg Jr., MD    HPI:   Irvin Nuñez is a 75 yo male with PMHx HTN, HLD, schizophrenia, and vascular dementia who was transferred [place] [facility]SH ED [place] [facility] [place]-like activity post cardiac arrest. Per family at OSH, patient has been having syncopal episodes for the past 4 months which have become much more frequent in the past 1-2 months. Today patient had another syncopal episode and collapsed at home. CPR was initiated by family and received 6 rounds of epi while being transported via [facility]. Patient was intubated and ROSC achieved en route. CT head neg for acute intracranial processes. Echo showed EF 67%. EKG NSR. Lactic acid and trop elevated. UA positive for nitrites, leukocyte esterase, WBC, and bacteria. K 3.2. ABG with metabolic acidosis with partial respiratory compensation. While at OSH ED, patient had intermittent twitching of the head and eyes concerning for possible seizure like activity. He was kept on prop 30 for suppression of any possible seizure activity. He was transferred to [facility] [place] workup and higher level of care.      Hospital Course by Event: 10/31/2024: Rewarming started at 4AM, achieved target temp at 7:58AM. EEG with diffusely suppressed background. MRI consistent with global anoxic injury and obstructive hydrocephalus. Made DNR after conversation with brother and sister at bedside (see ACP note.)  11/01/2024: Overnight patient required both cooling and Jessica hugger to maintain normothermia. Fent started due to tachypnea, tachycardia, vent dyssynchrony. Planning for family meeting Sunday.  11/02/2024: [facility]HA. [facility] ordered for overnight followed  by family meeting tomorrow.   11/3: family meeting today for GOC and prognostication  2024 continue comfort care, d/c to inpatient hospice     Admitted to [facility] hospice  for ongoing inpt management needs for optimal symptom control    * No surgery found *      Hospital Course:   Following admission to the hospice unit, patient's symptoms were managed with proportionate opioids and benzodiazepines to ensure comfort during end of life care. Family and patient were supported by our interdisciplinary team of doctors, nurses, PCTs, chaplains, social workers, and other team members. Patient peacefully passed away on 11/15/24 at 00:14.      Goals of Care Treatment Preferences:  Code Status: DNR    Consults:     No new Assessment & Plan notes have been filed under this hospital service since the last note was generated.  Service: Palliative Medicine    Final Active Diagnoses:    Diagnosis Date Noted POA    Hospice care [Z51.5] 2024 Not Applicable    Comfort measures only status [Z51.5] 2024 Not Applicable      Problems Resolved During this Admission:     Discharged Condition:     Disposition:     Patient Instructions:   No discharge procedures on file.    Significant Diagnostic Studies: N/A    Pending Diagnostic Studies:       None           Medications:  None    Indwelling Lines/Drains at time of discharge:   Lines/Drains/Airways       Drain  Duration                  Suprapubic Catheter 10/30/24 1255 15 days                  Dontrell White MD  Department of Palliative Medicine  [facility] - Hospice and Palliative Medicine

## 2024-11-15 NOTE — NURSING
[id number] PN called CN to room, pt noted no respirations and no heart beat, sent secure chat to Dr. Cook on call,  notified, Parish called, Nephew Javon called and notified    0223 attempted to call Chauncey, left a message that was trying to see if family still planning on coming, informed pt will have to leave unit at 4 am if we do not hear back

## 2024-11-15 NOTE — NURSING
Pt assessed per flowsheet. Pt in bed with eyes closed, and is nonverbal. Breathing is shallow. Supra pubic cath in place per protocol. Morphine continuous drip infusing to LUE Midline at 5 mg/ hr. Mepilex to bilateral heels and sacral area. Comfort care maintained.

## 2024-11-15 NOTE — SIGNIFICANT EVENT
[facility]  Death Note      I was called to patient's bedside by RN to evaluate Mr. Nuñez. This was an expected death in a patient with wishes for DNR and on comfort care.    Patient identity was confirmed by ID drake. Patient was unresponsive to verbal or tactile stimulation. Pupils were fixed, and corneal reflexes were absent. There were no heart sounds, and pulses were absent. There were no spontaneous respirations.     Patient pronounced  at 0014. Family notified by RN.    Nikolas Cook MD  [facility]

## 2024-11-19 ENCOUNTER — DOCUMENT SCAN (OUTPATIENT)
Dept: HOME HEALTH SERVICES | Facility: HOSPITAL | Age: 74
End: 2024-11-19
Payer: MEDICARE

## 2024-12-15 NOTE — PROGRESS NOTES
[facility] - Neuro Critical Care  Neurocritical Care  Progress Note    Admit Date: 10/30/2024  Service Date: 11/02/2024  Length of Stay: 3    Subjective:     Chief Complaint: Cardiac arrest    History of Present Illness: Irvin Nuñez is a 73 yo male with PMHx HTN, HLD, schizophrenia, and vascular dementia who was transferred from OSH ED to [facility] for seizure-like activity post cardiac arrest. Per family at OSH, patient has been having syncopal episodes for the past 4 months which have become much more frequent in the past 1-2 months. Today patient had another syncopal episode and collapsed at home. CPR was initiated by family and received 6 rounds of epi while being transported via [place]. Patient was intubated and ROSC achieved en route. CT head neg for acute intracranial processes. Echo showed EF 67%. EKG NSR. Lactic acid and trop elevated. UA positive for nitrites, leukocyte esterase, WBC, and bacteria. K 3.2. ABG with metabolic acidosis with partial respiratory compensation. While at OSH ED, patient had intermittent twitching of the head and eyes concerning for possible seizure like activity. He was kept on prop 30 for suppression of any possible seizure activity. He was transferred to [facility] for further workup and higher level of care.     Hospital Course: 10/31/2024: Rewarming started at 4AM, achieved target temp at 7:58AM. EEG with diffusely suppressed background. MRI consistent with global anoxic injury and obstructive hydrocephalus. Made DNR after conversation with brother and sister at bedside (see ACP note.)  11/01/2024: Overnight patient required both cooling and Jessica hugger to maintain normothermia. Fent started due to tachypnea, tachycardia, vent dyssynchrony. Planning for family meeting Sunday.  11/02/2024: SASHA. [facility] ordered for overnight followed by family meeting tomorrow.     Interval History:  See hospital course    Review of Systems   Unable to perform ROS: Intubated     Objective:     Vitals:  Temp:  99 °F (37.2 °C)  Pulse: 100  Rhythm: normal sinus rhythm  BP: 139/70  MAP (mmHg): 95  Resp: 17  SpO2: 100 %  Oxygen Concentration (%): 40  Vent Mode: A/C  Set Rate: 18 BPM  Vt Set: 480 mL  PEEP/CPAP: 5 cmH20  Peak Airway Pressure: 21 cmH20  Mean Airway Pressure: 9.4 cmH20  Plateau Pressure: 0 cmH20    Temp  Min: 97 °F (36.1 °C)  Max: 100 °F (37.8 °C)  Pulse  Min: 70  Max: 108  BP  Min: 107/58  Max: 191/86  MAP (mmHg)  Min: 78  Max: 123  Resp  Min: 2  Max: 18  SpO2  Min: 100 %  Max: 100 %  Oxygen Concentration (%)  Min: 40  Max: 40    11/01 0701 - 11/02 0700  In: 2081.4 [I.V.:1021.4]  Out: 1120 [Urine:1120]   Unmeasured Output  Stool Occurrence: 0        Physical Exam  Vitals and nursing note reviewed.   Constitutional:       Appearance: He is ill-appearing.   HENT:      Head: Normocephalic and atraumatic.   Cardiovascular:      Rate and Rhythm: Normal rate and regular rhythm.   Pulmonary:      Comments: Mechanical breath sounds  Musculoskeletal:      Right lower leg: No edema.      Left lower leg: No edema.   Skin:     General: Skin is warm and dry.   Neurological:      Mental Status: He is unresponsive.      GCS: GCS eye subscore is 1. GCS verbal subscore is 1. GCS motor subscore is 2.      Comments: - Subtle R corneal intact  - No cough or gag  - Extensor LUE, triple flex LLE  - RUE/RLE no movement        Medications:  Continuousfentanyl, Last Rate: 50 mcg/hr (11/02/24 1101)  sodium chloride (23.4%) HYPERTONIC 4 mEq/mL 172 mEq in sterile water 500 mL (sodium chloride 2%) infusion, Last Rate: 50 mL/hr at 11/02/24 1101    ScheduledamLODIPine, 10 mg, Daily  dorzolamide, 1 drop, BID  enoxparin, 40 mg, Daily  famotidine, 20 mg, BID  meropenem IV (PEDS and ADULTS), 1 g, Q8H  polyethylene glycol, 17 g, BID  pravastatin, 40 mg, Daily  senna-docusate 8.6-50 mg, 2 tablet, BID  timolol maleate 0.5%, 1 drop, BID  white petrolatum-mineral oiL, , QID    PRNacetaminophen, 650 mg, Q6H PRN  bisacodyL, 10 mg, Daily PRN  fentaNYL,  "50 mcg, Q1H PRN  hydrALAZINE, 10 mg, Q4H PRN  magnesium oxide, 800 mg, PRN  magnesium oxide, 800 mg, PRN  potassium bicarbonate, 35 mEq, PRN  potassium bicarbonate, 50 mEq, PRN  potassium bicarbonate, 60 mEq, PRN  potassium, sodium phosphates, 2 packet, PRN  potassium, sodium phosphates, 2 packet, PRN  potassium, sodium phosphates, 2 packet, PRN  sodium chloride 0.9%, 10 mL, PRN      Today I personally reviewed pertinent medications, lines/drains/airways, imaging, cardiology results, laboratory results, microbiology results,     Diet  Diet NPO    Assessment/Plan:     Neuro  Brain hypoxic injury  10/31/2024 MRI - "Focal cortically based diffusion restriction about the right frontal lobe, left greater than right occipital lobes, and involving the cerebellar hemisphere with superimposed mild diffuse symmetric cortically based diffusion signal abnormality throughout both cerebral hemispheres and to a lesser extent involving the basal ganglia. Findings remain concerning for areas of evolving acute/subacute infarct with possible superimposed global anoxic injury, noting that additional radiology such as sequela of seizure-like activity is also considered. Sequela of infectious or non infectious inflammatory cerebritis is also considered but felt less likely."    EEG with diffuse background suppression.    See "Cardiac arrest" for management.     Obstructive hydrocephalus  10/31/2024 MRI - "Marked effacement about the cerebellar vermis and hemispheres with effacement of the 4th ventricle, slight upward cerebellar tonsillar herniation, and crowding of the cerebellar tonsils at the foramen magnum. There is resultant upstream obstructive hydrocephalus involving the lateral and 3rd ventricles with probable transependymal flow of CSF."    - NSGY consulted - nothing to offer  - Goal Na > 150; sodium q6h  - Continue 2% hypertonic saline gtt    Seizure  Patient initially exhibited head and eye twitching at outside hospital " "post-cardiac arrest. CT head negative for acute intracranial processes. Transferred to [facility] for further care. EEG without evidence of seizure but with diffuse, severe burst suppression.    Vascular dementia  Holding home Aricept in the setting of acute illness    Ophtho  Glaucoma  - Continue Cosopt drops per Ophtho recs  - Optho recommending "comfort care" of left eye" please see note dated 10/31 for more details     Pulmonary  Acute respiratory failure  Respiratory failure which is Acute.  he is not on home oxygen. Supplemental oxygen was provided and noted- Vent Mode: A/C  Oxygen Concentration (%):  [40] 40  Resp Rate Total:  [16 br/min-19 br/min] 18 br/min  Vt Set:  [450 mL-480 mL] 480 mL  PEEP/CPAP:  [5 cmH20] 5 cmH20  Mean Airway Pressure:  [8.4 cmH20-10 cmH20] 9.4 cmH20    .   Signs/symptoms of respiratory failure include- tachypnea and respiratory distress. Contributing diagnoses includes -  cardiac arrest  Labs and images were reviewed. Patient Has recent ABG, which has been reviewed. Will treat underlying causes and adjust management of respiratory failure as follows-     Recent Labs     10/31/24  0452 11/01/24  0403 11/02/24  0357   PH 7.438 7.377 7.374   PCO2 31.9* 45.1* 52.5*   PO2 165* 132* 123*   HCO3 21.6* 26.5 30.6*   POCSATURATED 100 99 99   BE -3* 1 5*       Cardiac/Vascular  * Cardiac arrest  Patient experienced out-of-hospital cardiac arrest. CPR initiated by family. ROSC after 6 rounds of epi. No documentation of rhythm or shocks delivered. Echo 10/29 EF 67%, no wall motion abnormalities, no severe valvular disease. EKG 10/29 NSR with LAD and new RBBB    - TTM - achieved goal temp 7:58 AM on Thursday 10/31/2024  -   - Patient is now a DNR  - Lactic acidosis downtrended    Bifascicular block  - Cardiology consulted, appreciate recs  - Monitor on telemetry  - Avoid beta-blockers  - Call CCU and Cardiology ASAP if patient develops heart block    Hyperlipidemia  Continue home pravastatin 40 mg " daily    Essential hypertension  Patients blood pressure range in the last 24 hours was: BP  Min: 74/45  Max: 267/162.The patient's inpatient anti-hypertensive regimen is listed below:  Current Antihypertensives  amLODIPine tablet 10 mg, Daily, Per OG tube  hydrALAZINE injection 10 mg, Every 4 hours PRN, Intravenous  timolol maleate 0.5% ophthalmic solution 1 drop, 2 times daily, Both Eyes    Plan  - BP is controlled, no changes needed to their regimen    Renal/  Penile lesion  Multiple penile ulcerations on glans. S/p IM Penicillin x 1 on 10/30 (Treponema negative but ID curbside recommended treating as it is possible patient had not yet seroconverted.)     - F/u penile swab culture    Chronic suprapubic catheter  Patient on alfuzosin, finasteride, and oxybutynin at home; holding in setting of acute illness with chronic suprapubic catheter in place. Outside catheter changed out on 10/30.    Urinary tract infection associated with catheterization of urinary tract  UA positive for nitrites, leukocyte esterase, WBC, and bacteria. Repeat U/A after catheter exchange still consistent with UTI.     - Continue meropenem 1 g IV q8h x 5 days per ID    Oncology  Microcytic anemia  Anemia is likely due to  unknown . Most recent hemoglobin and hematocrit are listed below.  Recent Labs     10/31/24  0005 10/31/24  0452 11/01/24  0004 11/01/24  0403 11/02/24  0357 11/02/24  0857   HGB 8.5*  --  8.4*  --   --  7.9*   HCT 27.3*   < > 26.8* 28* 28* 26.8*    < > = values in this interval not displayed.     Plan  - Monitor serial CBC: Daily  - Transfuse PRBC if patient becomes hemodynamically unstable, symptomatic or H/H drops below 7/21.  - Patient has not received any PRBC transfusions to date  - Patient's anemia is currently stable    GI  Transaminitis  Recent Labs     10/31/24  1319 11/01/24  0004 11/02/24  0857   BILITOT 0.3 0.2 0.2   ALKPHOS 89 77 85   * 122* 114*   ALT 64* 55* 57*     Stable. Continue to monitor with  daily CMPs.     Palliative Care  ACP (advance care planning)  See ACP note dated 10/31, patient is DNR. Planning for family meeting Jay 11/3.          The patient is being Prophylaxed for:  Venous Thromboembolism with: Mechanical or Chemical  Stress Ulcer with: H2B  Ventilator Pneumonia with: chlorhexidine oral care    Activity Orders            Diet NPO: NPO starting at 10/31 1559    Elevate HOB Elevate (30-45 degrees) Elevate HOB to 30 - 45 degrees during feeding unless otherwise stated starting at 10/30 1042    Progressive Mobility Protocol (mobilize patient to their highest level of functioning at least twice daily) starting at 10/30 0800    Turn patient every 2 hours starting at 10/30 0600          DNR    Kathie Garvey MD  Neurocritical Care  [facility] - Neuro Critical Care       DISCHARGE